# Patient Record
Sex: FEMALE | Race: WHITE | NOT HISPANIC OR LATINO | Employment: FULL TIME | URBAN - NONMETROPOLITAN AREA
[De-identification: names, ages, dates, MRNs, and addresses within clinical notes are randomized per-mention and may not be internally consistent; named-entity substitution may affect disease eponyms.]

---
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
## 2021-06-02 ENCOUNTER — APPOINTMENT (OUTPATIENT)
Dept: ULTRASOUND IMAGING | Facility: HOSPITAL | Age: 21
End: 2021-06-02

## 2021-06-02 ENCOUNTER — HOSPITAL ENCOUNTER (EMERGENCY)
Facility: HOSPITAL | Age: 21
Discharge: HOME | End: 2021-06-03
Attending: EMERGENCY MEDICINE | Admitting: EMERGENCY MEDICINE

## 2021-06-02 DIAGNOSIS — O20.0 THREATENED MISCARRIAGE IN EARLY PREGNANCY: Primary | ICD-10-CM

## 2021-06-02 LAB
BASOPHILS # BLD AUTO: 0.04 10*3/MM3 (ref 0–0.2)
BASOPHILS NFR BLD AUTO: 0.3 % (ref 0–1.5)
DEPRECATED RDW RBC AUTO: 40.7 FL (ref 37–54)
EOSINOPHIL # BLD AUTO: 0.23 10*3/MM3 (ref 0–0.4)
EOSINOPHIL NFR BLD AUTO: 2 % (ref 0.3–6.2)
ERYTHROCYTE [DISTWIDTH] IN BLOOD BY AUTOMATED COUNT: 12.5 % (ref 12.3–15.4)
HCT VFR BLD AUTO: 41.4 % (ref 34–46.6)
HGB BLD-MCNC: 13.8 G/DL (ref 12–15.9)
IMM GRANULOCYTES # BLD AUTO: 0.05 10*3/MM3 (ref 0–0.05)
IMM GRANULOCYTES NFR BLD AUTO: 0.4 % (ref 0–0.5)
LYMPHOCYTES # BLD AUTO: 3.57 10*3/MM3 (ref 0.7–3.1)
LYMPHOCYTES NFR BLD AUTO: 30.6 % (ref 19.6–45.3)
MCH RBC QN AUTO: 29.7 PG (ref 26.6–33)
MCHC RBC AUTO-ENTMCNC: 33.3 G/DL (ref 31.5–35.7)
MCV RBC AUTO: 89 FL (ref 79–97)
MONOCYTES # BLD AUTO: 1.14 10*3/MM3 (ref 0.1–0.9)
MONOCYTES NFR BLD AUTO: 9.8 % (ref 5–12)
NEUTROPHILS NFR BLD AUTO: 56.9 % (ref 42.7–76)
NEUTROPHILS NFR BLD AUTO: 6.63 10*3/MM3 (ref 1.7–7)
NRBC BLD AUTO-RTO: 0 /100 WBC (ref 0–0.2)
PLATELET # BLD AUTO: 327 10*3/MM3 (ref 140–450)
PMV BLD AUTO: 8.9 FL (ref 6–12)
RBC # BLD AUTO: 4.65 10*6/MM3 (ref 3.77–5.28)
WBC # BLD AUTO: 11.66 10*3/MM3 (ref 3.4–10.8)

## 2021-06-02 PROCEDURE — 86900 BLOOD TYPING SEROLOGIC ABO: CPT | Performed by: EMERGENCY MEDICINE

## 2021-06-02 PROCEDURE — 84702 CHORIONIC GONADOTROPIN TEST: CPT | Performed by: EMERGENCY MEDICINE

## 2021-06-02 PROCEDURE — 86850 RBC ANTIBODY SCREEN: CPT | Performed by: EMERGENCY MEDICINE

## 2021-06-02 PROCEDURE — 86901 BLOOD TYPING SEROLOGIC RH(D): CPT | Performed by: EMERGENCY MEDICINE

## 2021-06-02 PROCEDURE — 96361 HYDRATE IV INFUSION ADD-ON: CPT

## 2021-06-02 PROCEDURE — 76801 OB US < 14 WKS SINGLE FETUS: CPT

## 2021-06-02 PROCEDURE — 85025 COMPLETE CBC W/AUTO DIFF WBC: CPT | Performed by: EMERGENCY MEDICINE

## 2021-06-02 PROCEDURE — P9612 CATHETERIZE FOR URINE SPEC: HCPCS

## 2021-06-02 PROCEDURE — 96360 HYDRATION IV INFUSION INIT: CPT

## 2021-06-02 PROCEDURE — 80053 COMPREHEN METABOLIC PANEL: CPT | Performed by: EMERGENCY MEDICINE

## 2021-06-02 PROCEDURE — 99283 EMERGENCY DEPT VISIT LOW MDM: CPT

## 2021-06-02 RX ORDER — SODIUM CHLORIDE 9 MG/ML
125 INJECTION, SOLUTION INTRAVENOUS CONTINUOUS
Status: DISCONTINUED | OUTPATIENT
Start: 2021-06-02 | End: 2021-06-03 | Stop reason: HOSPADM

## 2021-06-02 RX ADMIN — SODIUM CHLORIDE 125 ML/HR: 9 INJECTION, SOLUTION INTRAVENOUS at 23:43

## 2021-06-02 RX ADMIN — SODIUM CHLORIDE 1000 ML: 9 INJECTION, SOLUTION INTRAVENOUS at 23:42

## 2021-06-03 VITALS
TEMPERATURE: 97.3 F | SYSTOLIC BLOOD PRESSURE: 118 MMHG | RESPIRATION RATE: 18 BRPM | WEIGHT: 215 LBS | BODY MASS INDEX: 33.74 KG/M2 | DIASTOLIC BLOOD PRESSURE: 57 MMHG | HEART RATE: 91 BPM | OXYGEN SATURATION: 100 % | HEIGHT: 67 IN

## 2021-06-03 LAB
ABO GROUP BLD: NORMAL
ALBUMIN SERPL-MCNC: 3.87 G/DL (ref 3.5–5.2)
ALBUMIN/GLOB SERPL: 1.2 G/DL
ALP SERPL-CCNC: 86 U/L (ref 39–117)
ALT SERPL W P-5'-P-CCNC: 26 U/L (ref 1–33)
ANION GAP SERPL CALCULATED.3IONS-SCNC: 11 MMOL/L (ref 5–15)
AST SERPL-CCNC: 18 U/L (ref 1–32)
BACTERIA UR QL AUTO: ABNORMAL /HPF
BILIRUB SERPL-MCNC: 0.2 MG/DL (ref 0–1.2)
BILIRUB UR QL STRIP: NEGATIVE
BLD GP AB SCN SERPL QL: NEGATIVE
BUN SERPL-MCNC: 13 MG/DL (ref 6–20)
BUN/CREAT SERPL: 12.5 (ref 7–25)
CALCIUM SPEC-SCNC: 9.2 MG/DL (ref 8.6–10.5)
CHLORIDE SERPL-SCNC: 104 MMOL/L (ref 98–107)
CLARITY UR: CLEAR
CO2 SERPL-SCNC: 24 MMOL/L (ref 22–29)
COLOR UR: YELLOW
CREAT SERPL-MCNC: 1.04 MG/DL (ref 0.57–1)
GFR SERPL CREATININE-BSD FRML MDRD: 67 ML/MIN/1.73
GLOBULIN UR ELPH-MCNC: 3.2 GM/DL
GLUCOSE SERPL-MCNC: 99 MG/DL (ref 65–99)
GLUCOSE UR STRIP-MCNC: NEGATIVE MG/DL
HCG INTACT+B SERPL-ACNC: 4235 MIU/ML
HGB UR QL STRIP.AUTO: ABNORMAL
HYALINE CASTS UR QL AUTO: ABNORMAL /LPF
KETONES UR QL STRIP: ABNORMAL
LEUKOCYTE ESTERASE UR QL STRIP.AUTO: NEGATIVE
NITRITE UR QL STRIP: NEGATIVE
NUMBER OF DOSES: NORMAL
PH UR STRIP.AUTO: 6 [PH] (ref 5–8)
POTASSIUM SERPL-SCNC: 3.8 MMOL/L (ref 3.5–5.2)
PROT SERPL-MCNC: 7.1 G/DL (ref 6–8.5)
PROT UR QL STRIP: NEGATIVE
RBC # UR: ABNORMAL /HPF
REF LAB TEST METHOD: ABNORMAL
RH BLD: NEGATIVE
SODIUM SERPL-SCNC: 139 MMOL/L (ref 136–145)
SP GR UR STRIP: >=1.03 (ref 1–1.03)
SQUAMOUS #/AREA URNS HPF: ABNORMAL /HPF
UROBILINOGEN UR QL STRIP: ABNORMAL
WBC UR QL AUTO: ABNORMAL /HPF

## 2021-06-03 PROCEDURE — 96372 THER/PROPH/DIAG INJ SC/IM: CPT

## 2021-06-03 PROCEDURE — 25010000002 RHO D IMMUNE GLOBULIN 1500 UNITS SOLUTION PREFILLED SYRINGE: Performed by: EMERGENCY MEDICINE

## 2021-06-03 PROCEDURE — 81001 URINALYSIS AUTO W/SCOPE: CPT | Performed by: EMERGENCY MEDICINE

## 2021-06-03 RX ADMIN — HUMAN RHO(D) IMMUNE GLOBULIN 300 MCG: 300 INJECTION, SOLUTION INTRAMUSCULAR at 01:14

## 2021-06-03 NOTE — ED PROVIDER NOTES
Subjective   21-year-old female G1, states 6 weeks pregnant. States that she had been carrying some heavy objects up and down the stairs in her home, went to the bathroom and noted some mild vaginal bleeding that included some small clots. No pain, fever, chills, syncope or near syncope, other symptoms or other complaints. She does not know her blood type. She reports that she is in general healthy, has no medical problems, takes no medications, has no known drug allergies.          Review of Systems   Constitutional: Negative for chills, diaphoresis and fever.   HENT: Negative for ear pain, sore throat and trouble swallowing.    Eyes: Negative for photophobia and pain.   Respiratory: Negative for shortness of breath, wheezing and stridor.    Cardiovascular: Negative for chest pain and palpitations.   Gastrointestinal: Negative for abdominal distention, abdominal pain, blood in stool, diarrhea, nausea and vomiting.   Endocrine: Negative for polydipsia and polyphagia.   Genitourinary: Positive for vaginal bleeding. Negative for difficulty urinating and flank pain.   Musculoskeletal: Negative for back pain, neck pain and neck stiffness.   Skin: Negative for color change and pallor.   Neurological: Negative for seizures, syncope and speech difficulty.   Psychiatric/Behavioral: Negative for confusion.   All other systems reviewed and are negative.      Past Medical History:   Diagnosis Date   • History of bronchitis    • History of ear infections        No Known Allergies    Past Surgical History:   Procedure Laterality Date   • ADENOIDECTOMY     • EAR TUBES         Family History   Problem Relation Age of Onset   • No Known Problems Mother    • No Known Problems Father        Social History     Socioeconomic History   • Marital status: Single     Spouse name: Not on file   • Number of children: Not on file   • Years of education: Not on file   • Highest education level: Not on file   Tobacco Use   • Smoking status:  Never Smoker   • Smokeless tobacco: Never Used   Substance and Sexual Activity   • Alcohol use: No   • Drug use: No           Objective   Physical Exam  Vitals and nursing note reviewed.   Constitutional:       General: She is not in acute distress.     Appearance: Normal appearance. She is well-developed. She is not ill-appearing, toxic-appearing or diaphoretic.      Comments: Well-developed well-nourished young female, in no apparent distress. Appears comfortable. Appears well.   HENT:      Head: Normocephalic and atraumatic.   Eyes:      General: No scleral icterus.     Pupils: Pupils are equal, round, and reactive to light.   Neck:      Trachea: No tracheal deviation.   Cardiovascular:      Rate and Rhythm: Normal rate and regular rhythm.   Pulmonary:      Effort: Pulmonary effort is normal. No respiratory distress.      Breath sounds: Normal breath sounds.   Chest:      Chest wall: No tenderness.   Abdominal:      General: Bowel sounds are normal.      Palpations: Abdomen is soft.      Tenderness: There is no abdominal tenderness. There is no guarding or rebound.   Musculoskeletal:         General: No tenderness. Normal range of motion.      Cervical back: Normal range of motion and neck supple. No rigidity or tenderness.      Right lower leg: No edema.      Left lower leg: No edema.   Skin:     General: Skin is warm and dry.      Capillary Refill: Capillary refill takes less than 2 seconds.      Coloration: Skin is not pale.   Neurological:      General: No focal deficit present.      Mental Status: She is alert and oriented to person, place, and time.      GCS: GCS eye subscore is 4. GCS verbal subscore is 5. GCS motor subscore is 6.      Motor: No abnormal muscle tone.   Psychiatric:         Mood and Affect: Mood normal.         Behavior: Behavior normal.         Procedures  US Ob < 14 Weeks Single or First Gestation    Result Date: 6/2/2021  Narrative: US OB 2 or 3 Tri Sgl 1st Gest INDICATION:  Patient  approximately 6 weeks pregnant. Bleeding. Nausea. TECHNIQUE: Transvaginal ultrasound of the pelvis in multiple planes. COMPARISON:  None available. FINDINGS: Uterus measures 8.5 x 3.7 x 4.3 cm. Cervix is closed. Small intrauterine gestational sac is noted. There is a tiny yolk sac but there is no definite fetal pole at this time. Estimated age by ultrasound measurement is 5 weeks 5 days for an estimated delivery date of 1/28/2022. Both ovaries show perfusion by Doppler. There is a small left ovarian cyst measuring 1.3 cm. Small right ovarian cyst is also present measuring about 1.0 cm. No adnexal masses.     Impression: 1. Early intrauterine pregnancy at about 5 weeks 5 days gestational age. Obstetrical follow-up recommended. Cervix is closed. 2. Small bilateral ovarian cysts. 3. No evidence of ovarian torsion. Signer Name: Fernando Ronquillo MD  Signed: 6/2/2021 11:18 PM  Workstation Name: [facility]  [facility]    Results for orders placed or performed during the hospital encounter of 06/02/21   Comprehensive Metabolic Panel    Specimen: Blood   Result Value Ref Range    Glucose 99 65 - 99 mg/dL    BUN 13 6 - 20 mg/dL    Creatinine 1.04 (H) 0.57 - 1.00 mg/dL    Sodium 139 136 - 145 mmol/L    Potassium 3.8 3.5 - 5.2 mmol/L    Chloride 104 98 - 107 mmol/L    CO2 24.0 22.0 - 29.0 mmol/L    Calcium 9.2 8.6 - 10.5 mg/dL    Total Protein 7.1 6.0 - 8.5 g/dL    Albumin 3.87 3.50 - 5.20 g/dL    ALT (SGPT) 26 1 - 33 U/L    AST (SGOT) 18 1 - 32 U/L    Alkaline Phosphatase 86 39 - 117 U/L    Total Bilirubin 0.2 0.0 - 1.2 mg/dL    eGFR Non African Amer 67 >60 mL/min/1.73    Globulin 3.2 gm/dL    A/G Ratio 1.2 g/dL    BUN/Creatinine Ratio 12.5 7.0 - 25.0    Anion Gap 11.0 5.0 - 15.0 mmol/L   hCG, Quantitative, Pregnancy    Specimen: Blood   Result Value Ref Range    HCG Quantitative 4,235.00 mIU/mL   Urinalysis With Culture If Indicated - Urine, Catheter    Specimen: Urine, Catheter   Result Value Ref  Range    Color, UA Yellow Yellow, Straw    Appearance, UA Clear Clear    pH, UA 6.0 5.0 - 8.0    Specific Gravity, UA >=1.030 1.005 - 1.030    Glucose, UA Negative Negative    Ketones, UA Trace (A) Negative    Bilirubin, UA Negative Negative    Blood, UA Trace (A) Negative    Protein, UA Negative Negative    Leuk Esterase, UA Negative Negative    Nitrite, UA Negative Negative    Urobilinogen, UA 0.2 E.U./dL 0.2 - 1.0 E.U./dL   CBC Auto Differential    Specimen: Blood   Result Value Ref Range    WBC 11.66 (H) 3.40 - 10.80 10*3/mm3    RBC 4.65 3.77 - 5.28 10*6/mm3    Hemoglobin 13.8 12.0 - 15.9 g/dL    Hematocrit 41.4 34.0 - 46.6 %    MCV 89.0 79.0 - 97.0 fL    MCH 29.7 26.6 - 33.0 pg    MCHC 33.3 31.5 - 35.7 g/dL    RDW 12.5 12.3 - 15.4 %    RDW-SD 40.7 37.0 - 54.0 fl    MPV 8.9 6.0 - 12.0 fL    Platelets 327 140 - 450 10*3/mm3    Neutrophil % 56.9 42.7 - 76.0 %    Lymphocyte % 30.6 19.6 - 45.3 %    Monocyte % 9.8 5.0 - 12.0 %    Eosinophil % 2.0 0.3 - 6.2 %    Basophil % 0.3 0.0 - 1.5 %    Immature Grans % 0.4 0.0 - 0.5 %    Neutrophils, Absolute 6.63 1.70 - 7.00 10*3/mm3    Lymphocytes, Absolute 3.57 (H) 0.70 - 3.10 10*3/mm3    Monocytes, Absolute 1.14 (H) 0.10 - 0.90 10*3/mm3    Eosinophils, Absolute 0.23 0.00 - 0.40 10*3/mm3    Basophils, Absolute 0.04 0.00 - 0.20 10*3/mm3    Immature Grans, Absolute 0.05 0.00 - 0.05 10*3/mm3    nRBC 0.0 0.0 - 0.2 /100 WBC   Urinalysis, Microscopic Only - Urine, Catheter    Specimen: Urine, Catheter   Result Value Ref Range    RBC, UA 3-5 (A) None Seen, 0-2 /HPF    WBC, UA 0-2 None Seen, 0-2 /HPF    Bacteria, UA None Seen None Seen /HPF    Squamous Epithelial Cells, UA 0-2 None Seen, 0-2 /HPF    Hyaline Casts, UA None Seen None Seen /LPF    Methodology Automated Microscopy     RhIg Evaluation    Specimen: Blood   Result Value Ref Range    ABO Type B     RH type Negative     Antibody Screen Negative    Doses of Rh Immune Globulin    Specimen: Blood   Result Value Ref  Range    Number of Doses Recommend 1 vial of RhIg                 ED Course  ED Course as of Jun 03 0303   Thu Jun 03, 2021   0059 Patient's emergency department stay has been uneventful.  Never has she shown any signs of distress.  We discussed her test results and her plan of care.  She voices understanding and agreement.  She is going to be following with [facility] for obstetrical care.  She will follow-up with them in the next 1 to 2 days.  She will return the emergency department right away if symptoms worsen/any problems.    [CM]      ED Course User Index  [CM] Jorge Butler MD                                           [facility]    Final diagnoses:   Threatened miscarriage in early pregnancy       ED Disposition  ED Disposition     ED Disposition Condition Comment    Discharge Stable               Please note that portions of this note were completed with a voice recognition program.        Jorge Butler MD  06/03/21 [id number]

## 2021-06-03 NOTE — DISCHARGE INSTRUCTIONS
Home to rest.  Drink plenty of fluids.  No sex, no strenuous activity.  Call [place] [facility] early this morning to schedule first available follow-up appointment.  Return to the emergency department right away if symptoms worsen/any problems.

## 2021-06-04 LAB
EXTERNAL CHLAMYDIA SCREEN: NORMAL
EXTERNAL GONORRHEA SCREEN: NEGATIVE
EXTERNAL HEPATITIS B SURFACE ANTIGEN: NEGATIVE
EXTERNAL HEPATITIS C AB: NORMAL
EXTERNAL RUBELLA QUALITATIVE: NORMAL
EXTERNAL SYPHILIS RPR SCREEN: NORMAL
HIV1 P24 AG SERPL QL IA: NORMAL

## 2021-09-16 ENCOUNTER — LAB (OUTPATIENT)
Dept: LAB | Facility: HOSPITAL | Age: 21
End: 2021-09-16

## 2021-09-16 ENCOUNTER — TRANSCRIBE ORDERS (OUTPATIENT)
Dept: ADMINISTRATIVE | Facility: HOSPITAL | Age: 21
End: 2021-09-16

## 2021-09-16 DIAGNOSIS — Z11.52 ENCOUNTER FOR SCREENING FOR COVID-19: ICD-10-CM

## 2021-09-16 DIAGNOSIS — Z11.52 ENCOUNTER FOR SCREENING FOR COVID-19: Primary | ICD-10-CM

## 2021-09-16 PROCEDURE — C9803 HOPD COVID-19 SPEC COLLECT: HCPCS

## 2021-09-16 PROCEDURE — U0004 COV-19 TEST NON-CDC HGH THRU: HCPCS | Performed by: FAMILY MEDICINE

## 2021-09-17 LAB — SARS-COV-2 RNA NOSE QL NAA+PROBE: NOT DETECTED

## 2021-10-29 LAB — EXTERNAL GLUCOSE TOLERANCE TEST FASTING: 188 MG/DL

## 2021-11-03 LAB
EXTERNAL GTT 1 HOUR: 150
EXTERNAL GTT 3 HOURS: 132

## 2021-12-17 ENCOUNTER — HOSPITAL ENCOUNTER (INPATIENT)
Facility: HOSPITAL | Age: 21
LOS: 1 days | Discharge: HOME | End: 2021-12-18
Attending: OBSTETRICS & GYNECOLOGY | Admitting: OBSTETRICS & GYNECOLOGY

## 2021-12-17 PROBLEM — J10.1 INFLUENZA A: Status: ACTIVE | Noted: 2021-12-17

## 2021-12-17 PROBLEM — R53.1 WEAKNESS: Status: ACTIVE | Noted: 2021-12-17

## 2021-12-17 LAB
ALBUMIN SERPL-MCNC: 2.88 G/DL (ref 3.5–5.2)
ALBUMIN/GLOB SERPL: 1.1 G/DL
ALP SERPL-CCNC: 114 U/L (ref 39–117)
ALT SERPL W P-5'-P-CCNC: 6 U/L (ref 1–33)
ANION GAP SERPL CALCULATED.3IONS-SCNC: 14 MMOL/L (ref 5–15)
AST SERPL-CCNC: 10 U/L (ref 1–32)
BASOPHILS # BLD AUTO: 0.01 10*3/MM3 (ref 0–0.2)
BASOPHILS NFR BLD AUTO: 0.2 % (ref 0–1.5)
BILIRUB SERPL-MCNC: 0.2 MG/DL (ref 0–1.2)
BILIRUB UR QL STRIP: NEGATIVE
BUN SERPL-MCNC: 5 MG/DL (ref 6–20)
BUN/CREAT SERPL: 9.3 (ref 7–25)
CALCIUM SPEC-SCNC: 8.5 MG/DL (ref 8.6–10.5)
CHLORIDE SERPL-SCNC: 103 MMOL/L (ref 98–107)
CLARITY UR: ABNORMAL
CO2 SERPL-SCNC: 20 MMOL/L (ref 22–29)
COLOR UR: YELLOW
CREAT SERPL-MCNC: 0.54 MG/DL (ref 0.57–1)
DEPRECATED RDW RBC AUTO: 45.1 FL (ref 37–54)
EOSINOPHIL # BLD AUTO: 0 10*3/MM3 (ref 0–0.4)
EOSINOPHIL NFR BLD AUTO: 0 % (ref 0.3–6.2)
ERYTHROCYTE [DISTWIDTH] IN BLOOD BY AUTOMATED COUNT: 14 % (ref 12.3–15.4)
FLUAV SUBTYP SPEC NAA+PROBE: DETECTED
FLUBV RNA ISLT QL NAA+PROBE: NOT DETECTED
GFR SERPL CREATININE-BSD FRML MDRD: 143 ML/MIN/1.73
GLOBULIN UR ELPH-MCNC: 2.7 GM/DL
GLUCOSE SERPL-MCNC: 107 MG/DL (ref 65–99)
GLUCOSE UR STRIP-MCNC: ABNORMAL MG/DL
HCT VFR BLD AUTO: 32.9 % (ref 34–46.6)
HGB BLD-MCNC: 10.6 G/DL (ref 12–15.9)
HGB UR QL STRIP.AUTO: NEGATIVE
IMM GRANULOCYTES # BLD AUTO: 0.04 10*3/MM3 (ref 0–0.05)
IMM GRANULOCYTES NFR BLD AUTO: 0.7 % (ref 0–0.5)
KETONES UR QL STRIP: ABNORMAL
LEUKOCYTE ESTERASE UR QL STRIP.AUTO: NEGATIVE
LYMPHOCYTES # BLD AUTO: 0.95 10*3/MM3 (ref 0.7–3.1)
LYMPHOCYTES NFR BLD AUTO: 16.7 % (ref 19.6–45.3)
MCH RBC QN AUTO: 28.7 PG (ref 26.6–33)
MCHC RBC AUTO-ENTMCNC: 32.2 G/DL (ref 31.5–35.7)
MCV RBC AUTO: 89.2 FL (ref 79–97)
MONOCYTES # BLD AUTO: 1 10*3/MM3 (ref 0.1–0.9)
MONOCYTES NFR BLD AUTO: 17.6 % (ref 5–12)
NEUTROPHILS NFR BLD AUTO: 3.68 10*3/MM3 (ref 1.7–7)
NEUTROPHILS NFR BLD AUTO: 64.8 % (ref 42.7–76)
NITRITE UR QL STRIP: NEGATIVE
NRBC BLD AUTO-RTO: 0 /100 WBC (ref 0–0.2)
PH UR STRIP.AUTO: 7 [PH] (ref 5–8)
PLATELET # BLD AUTO: 199 10*3/MM3 (ref 140–450)
PMV BLD AUTO: 9.2 FL (ref 6–12)
POTASSIUM SERPL-SCNC: 3.6 MMOL/L (ref 3.5–5.2)
PROT SERPL-MCNC: 5.6 G/DL (ref 6–8.5)
PROT UR QL STRIP: NEGATIVE
QT INTERVAL: 328 MS
QTC INTERVAL: 435 MS
RBC # BLD AUTO: 3.69 10*6/MM3 (ref 3.77–5.28)
S PYO AG THROAT QL: NEGATIVE
SARS-COV-2 RNA PNL SPEC NAA+PROBE: NOT DETECTED
SARS-COV-2 RNA PNL SPEC NAA+PROBE: NOT DETECTED
SODIUM SERPL-SCNC: 137 MMOL/L (ref 136–145)
SP GR UR STRIP: 1.01 (ref 1–1.03)
UROBILINOGEN UR QL STRIP: ABNORMAL
WBC NRBC COR # BLD: 5.68 10*3/MM3 (ref 3.4–10.8)

## 2021-12-17 PROCEDURE — 80053 COMPREHEN METABOLIC PANEL: CPT | Performed by: OBSTETRICS & GYNECOLOGY

## 2021-12-17 PROCEDURE — 36415 COLL VENOUS BLD VENIPUNCTURE: CPT | Performed by: OBSTETRICS & GYNECOLOGY

## 2021-12-17 PROCEDURE — 87635 SARS-COV-2 COVID-19 AMP PRB: CPT | Performed by: OBSTETRICS & GYNECOLOGY

## 2021-12-17 PROCEDURE — 81003 URINALYSIS AUTO W/O SCOPE: CPT | Performed by: INTERNAL MEDICINE

## 2021-12-17 PROCEDURE — 85025 COMPLETE CBC W/AUTO DIFF WBC: CPT | Performed by: OBSTETRICS & GYNECOLOGY

## 2021-12-17 PROCEDURE — 87880 STREP A ASSAY W/OPTIC: CPT | Performed by: OBSTETRICS & GYNECOLOGY

## 2021-12-17 PROCEDURE — 87081 CULTURE SCREEN ONLY: CPT | Performed by: OBSTETRICS & GYNECOLOGY

## 2021-12-17 PROCEDURE — 99221 1ST HOSP IP/OBS SF/LOW 40: CPT | Performed by: PHYSICIAN ASSISTANT

## 2021-12-17 PROCEDURE — 93010 ELECTROCARDIOGRAM REPORT: CPT | Performed by: INTERNAL MEDICINE

## 2021-12-17 PROCEDURE — 59025 FETAL NON-STRESS TEST: CPT

## 2021-12-17 PROCEDURE — 87086 URINE CULTURE/COLONY COUNT: CPT | Performed by: PHYSICIAN ASSISTANT

## 2021-12-17 PROCEDURE — 80306 DRUG TEST PRSMV INSTRMNT: CPT | Performed by: PHYSICIAN ASSISTANT

## 2021-12-17 PROCEDURE — 87636 SARSCOV2 & INF A&B AMP PRB: CPT | Performed by: OBSTETRICS & GYNECOLOGY

## 2021-12-17 PROCEDURE — 93005 ELECTROCARDIOGRAM TRACING: CPT | Performed by: OBSTETRICS & GYNECOLOGY

## 2021-12-17 RX ORDER — SODIUM CHLORIDE 0.9 % (FLUSH) 0.9 %
10 SYRINGE (ML) INJECTION AS NEEDED
Status: DISCONTINUED | OUTPATIENT
Start: 2021-12-17 | End: 2021-12-18 | Stop reason: HOSPADM

## 2021-12-17 RX ORDER — SODIUM CHLORIDE 0.9 % (FLUSH) 0.9 %
10 SYRINGE (ML) INJECTION EVERY 12 HOURS SCHEDULED
Status: DISCONTINUED | OUTPATIENT
Start: 2021-12-17 | End: 2021-12-18 | Stop reason: HOSPADM

## 2021-12-17 RX ORDER — PRENATAL VIT/IRON FUM/FOLIC AC 27MG-0.8MG
1 TABLET ORAL DAILY
Status: DISCONTINUED | OUTPATIENT
Start: 2021-12-18 | End: 2021-12-18 | Stop reason: HOSPADM

## 2021-12-17 RX ORDER — PRENATAL VIT/IRON FUM/FOLIC AC 27MG-0.8MG
1 TABLET ORAL DAILY
COMMUNITY
End: 2022-03-23

## 2021-12-17 RX ORDER — CALCIUM CARBONATE 200(500)MG
2 TABLET,CHEWABLE ORAL 3 TIMES DAILY PRN
Status: DISCONTINUED | OUTPATIENT
Start: 2021-12-17 | End: 2021-12-18 | Stop reason: HOSPADM

## 2021-12-17 RX ORDER — ACETAMINOPHEN 325 MG/1
650 TABLET ORAL EVERY 6 HOURS PRN
Status: DISCONTINUED | OUTPATIENT
Start: 2021-12-17 | End: 2021-12-18 | Stop reason: HOSPADM

## 2021-12-17 RX ORDER — ALUMINA, MAGNESIA, AND SIMETHICONE 2400; 2400; 240 MG/30ML; MG/30ML; MG/30ML
15 SUSPENSION ORAL EVERY 6 HOURS PRN
Status: DISCONTINUED | OUTPATIENT
Start: 2021-12-17 | End: 2021-12-18 | Stop reason: HOSPADM

## 2021-12-17 RX ORDER — SODIUM CHLORIDE, SODIUM LACTATE, POTASSIUM CHLORIDE, CALCIUM CHLORIDE 600; 310; 30; 20 MG/100ML; MG/100ML; MG/100ML; MG/100ML
125 INJECTION, SOLUTION INTRAVENOUS CONTINUOUS
Status: DISCONTINUED | OUTPATIENT
Start: 2021-12-17 | End: 2021-12-18 | Stop reason: HOSPADM

## 2021-12-17 RX ORDER — OSELTAMIVIR PHOSPHATE 75 MG/1
75 CAPSULE ORAL EVERY 12 HOURS SCHEDULED
Status: DISCONTINUED | OUTPATIENT
Start: 2021-12-17 | End: 2021-12-18 | Stop reason: HOSPADM

## 2021-12-17 RX ADMIN — SODIUM CHLORIDE, POTASSIUM CHLORIDE, SODIUM LACTATE AND CALCIUM CHLORIDE 100 ML/HR: 600; 310; 30; 20 INJECTION, SOLUTION INTRAVENOUS at 19:30

## 2021-12-17 RX ADMIN — OSELTAMIVIR PHOSPHATE 75 MG: 75 CAPSULE ORAL at 20:18

## 2021-12-17 RX ADMIN — SODIUM CHLORIDE, POTASSIUM CHLORIDE, SODIUM LACTATE AND CALCIUM CHLORIDE 1000 ML: 600; 310; 30; 20 INJECTION, SOLUTION INTRAVENOUS at 18:40

## 2021-12-17 NOTE — NON STRESS TEST
Liliam Crooks, a  at 33w1d with an SONIA of 2/3/2022, by Ultrasound, was seen at [facility] LABOR DELIVERY for a nonstress test.    Chief Complaint   Patient presents with   • Weakness - Generalized     c/o cough dizziness sore throat       Patient Active Problem List   Diagnosis   • Weakness       Start Time: 1645  Stop Time: 1700    Interpretation A  Nonstress Test Interpretation A: Reactive (21 1700 : Arlette Fragoso, RN)

## 2021-12-18 ENCOUNTER — APPOINTMENT (OUTPATIENT)
Dept: ULTRASOUND IMAGING | Facility: HOSPITAL | Age: 21
End: 2021-12-18

## 2021-12-18 ENCOUNTER — APPOINTMENT (OUTPATIENT)
Dept: CARDIOLOGY | Facility: HOSPITAL | Age: 21
End: 2021-12-18

## 2021-12-18 ENCOUNTER — HOSPITAL ENCOUNTER (OUTPATIENT)
Facility: HOSPITAL | Age: 21
End: 2021-12-18
Attending: OBSTETRICS & GYNECOLOGY | Admitting: OBSTETRICS & GYNECOLOGY

## 2021-12-18 VITALS
WEIGHT: 251 LBS | HEART RATE: 105 BPM | DIASTOLIC BLOOD PRESSURE: 62 MMHG | HEIGHT: 67 IN | BODY MASS INDEX: 39.39 KG/M2 | OXYGEN SATURATION: 98 % | RESPIRATION RATE: 20 BRPM | SYSTOLIC BLOOD PRESSURE: 123 MMHG | TEMPERATURE: 98.3 F

## 2021-12-18 LAB
ALBUMIN SERPL-MCNC: 2.83 G/DL (ref 3.5–5.2)
ALBUMIN/GLOB SERPL: 1.1 G/DL
ALP SERPL-CCNC: 109 U/L (ref 39–117)
ALT SERPL W P-5'-P-CCNC: 7 U/L (ref 1–33)
AMPHET+METHAMPHET UR QL: NEGATIVE
AMPHETAMINES UR QL: NEGATIVE
ANION GAP SERPL CALCULATED.3IONS-SCNC: 13.1 MMOL/L (ref 5–15)
AST SERPL-CCNC: 8 U/L (ref 1–32)
BARBITURATES UR QL SCN: NEGATIVE
BASOPHILS # BLD AUTO: 0.01 10*3/MM3 (ref 0–0.2)
BASOPHILS NFR BLD AUTO: 0.2 % (ref 0–1.5)
BENZODIAZ UR QL SCN: NEGATIVE
BH CV ECHO MEAS - ACS: 1.7 CM
BH CV ECHO MEAS - AO MAX PG: 10.9 MMHG
BH CV ECHO MEAS - AO MEAN PG: 5 MMHG
BH CV ECHO MEAS - AO ROOT AREA (BSA CORRECTED): 1.2
BH CV ECHO MEAS - AO ROOT AREA: 5.7 CM^2
BH CV ECHO MEAS - AO ROOT DIAM: 2.7 CM
BH CV ECHO MEAS - AO V2 MAX: 165 CM/SEC
BH CV ECHO MEAS - AO V2 MEAN: 104 CM/SEC
BH CV ECHO MEAS - AO V2 VTI: 33.2 CM
BH CV ECHO MEAS - BSA(HAYCOCK): 2.4 M^2
BH CV ECHO MEAS - BSA: 2.2 M^2
BH CV ECHO MEAS - BZI_BMI: 39.3 KILOGRAMS/M^2
BH CV ECHO MEAS - BZI_METRIC_HEIGHT: 170.2 CM
BH CV ECHO MEAS - BZI_METRIC_WEIGHT: 113.9 KG
BH CV ECHO MEAS - EDV(CUBED): 110.6 ML
BH CV ECHO MEAS - EDV(MOD-SP4): 59.5 ML
BH CV ECHO MEAS - EDV(TEICH): 107.5 ML
BH CV ECHO MEAS - EF(CUBED): 70.4 %
BH CV ECHO MEAS - EF(MOD-SP4): 72.1 %
BH CV ECHO MEAS - EF(TEICH): 61.9 %
BH CV ECHO MEAS - ESV(CUBED): 32.8 ML
BH CV ECHO MEAS - ESV(MOD-SP4): 16.6 ML
BH CV ECHO MEAS - ESV(TEICH): 41 ML
BH CV ECHO MEAS - FS: 33.3 %
BH CV ECHO MEAS - IVS/LVPW: 0.89
BH CV ECHO MEAS - IVSD: 0.8 CM
BH CV ECHO MEAS - LA DIMENSION: 2.9 CM
BH CV ECHO MEAS - LA/AO: 1.1
BH CV ECHO MEAS - LV DIASTOLIC VOL/BSA (35-75): 26.7 ML/M^2
BH CV ECHO MEAS - LV MASS(C)D: 137.1 GRAMS
BH CV ECHO MEAS - LV MASS(C)DI: 61.6 GRAMS/M^2
BH CV ECHO MEAS - LV SYSTOLIC VOL/BSA (12-30): 7.5 ML/M^2
BH CV ECHO MEAS - LVIDD: 4.8 CM
BH CV ECHO MEAS - LVIDS: 3.2 CM
BH CV ECHO MEAS - LVLD AP4: 7.4 CM
BH CV ECHO MEAS - LVLS AP4: 5.9 CM
BH CV ECHO MEAS - LVOT AREA (M): 3.5 CM^2
BH CV ECHO MEAS - LVOT AREA: 3.5 CM^2
BH CV ECHO MEAS - LVOT DIAM: 2.1 CM
BH CV ECHO MEAS - LVPWD: 0.9 CM
BH CV ECHO MEAS - MV A MAX VEL: 45.3 CM/SEC
BH CV ECHO MEAS - MV E MAX VEL: 87.5 CM/SEC
BH CV ECHO MEAS - MV E/A: 1.9
BH CV ECHO MEAS - PA ACC TIME: 0.09 SEC
BH CV ECHO MEAS - PA PR(ACCEL): 40.8 MMHG
BH CV ECHO MEAS - RAP SYSTOLE: 10 MMHG
BH CV ECHO MEAS - RVSP: 28 MMHG
BH CV ECHO MEAS - SI(AO): 85.4 ML/M^2
BH CV ECHO MEAS - SI(CUBED): 34.9 ML/M^2
BH CV ECHO MEAS - SI(MOD-SP4): 19.3 ML/M^2
BH CV ECHO MEAS - SI(TEICH): 29.9 ML/M^2
BH CV ECHO MEAS - SV(AO): 190.1 ML
BH CV ECHO MEAS - SV(CUBED): 77.8 ML
BH CV ECHO MEAS - SV(MOD-SP4): 42.9 ML
BH CV ECHO MEAS - SV(TEICH): 66.6 ML
BH CV ECHO MEAS - TR MAX VEL: 212 CM/SEC
BILIRUB SERPL-MCNC: 0.2 MG/DL (ref 0–1.2)
BUN SERPL-MCNC: 4 MG/DL (ref 6–20)
BUN/CREAT SERPL: 6.9 (ref 7–25)
BUPRENORPHINE SERPL-MCNC: NEGATIVE NG/ML
CALCIUM SPEC-SCNC: 8.5 MG/DL (ref 8.6–10.5)
CANNABINOIDS SERPL QL: NEGATIVE
CHLORIDE SERPL-SCNC: 105 MMOL/L (ref 98–107)
CO2 SERPL-SCNC: 20.9 MMOL/L (ref 22–29)
COCAINE UR QL: NEGATIVE
CREAT SERPL-MCNC: 0.58 MG/DL (ref 0.57–1)
CRP SERPL-MCNC: 2.24 MG/DL (ref 0–0.5)
D-LACTATE SERPL-SCNC: 0.6 MMOL/L (ref 0.5–2)
DEPRECATED RDW RBC AUTO: 46.2 FL (ref 37–54)
EOSINOPHIL # BLD AUTO: 0 10*3/MM3 (ref 0–0.4)
EOSINOPHIL NFR BLD AUTO: 0 % (ref 0.3–6.2)
ERYTHROCYTE [DISTWIDTH] IN BLOOD BY AUTOMATED COUNT: 14.4 % (ref 12.3–15.4)
FERRITIN SERPL-MCNC: 51.13 NG/ML (ref 13–150)
FOLATE SERPL-MCNC: 8.77 NG/ML (ref 4.78–24.2)
GFR SERPL CREATININE-BSD FRML MDRD: 131 ML/MIN/1.73
GLOBULIN UR ELPH-MCNC: 2.5 GM/DL
GLUCOSE SERPL-MCNC: 92 MG/DL (ref 65–99)
HBA1C MFR BLD: 5.3 % (ref 4.8–5.6)
HCT VFR BLD AUTO: 31.4 % (ref 34–46.6)
HGB BLD-MCNC: 10.2 G/DL (ref 12–15.9)
IMM GRANULOCYTES # BLD AUTO: 0.04 10*3/MM3 (ref 0–0.05)
IMM GRANULOCYTES NFR BLD AUTO: 0.7 % (ref 0–0.5)
IRON 24H UR-MRATE: 41 MCG/DL (ref 37–145)
IRON SATN MFR SERPL: 9 % (ref 20–50)
LYMPHOCYTES # BLD AUTO: 1.2 10*3/MM3 (ref 0.7–3.1)
LYMPHOCYTES NFR BLD AUTO: 21.2 % (ref 19.6–45.3)
MAGNESIUM SERPL-MCNC: 1.6 MG/DL (ref 1.6–2.6)
MAXIMAL PREDICTED HEART RATE: 199 BPM
MCH RBC QN AUTO: 28.9 PG (ref 26.6–33)
MCHC RBC AUTO-ENTMCNC: 32.5 G/DL (ref 31.5–35.7)
MCV RBC AUTO: 89 FL (ref 79–97)
METHADONE UR QL SCN: NEGATIVE
MONOCYTES # BLD AUTO: 0.96 10*3/MM3 (ref 0.1–0.9)
MONOCYTES NFR BLD AUTO: 17 % (ref 5–12)
NEUTROPHILS NFR BLD AUTO: 3.44 10*3/MM3 (ref 1.7–7)
NEUTROPHILS NFR BLD AUTO: 60.9 % (ref 42.7–76)
NRBC BLD AUTO-RTO: 0 /100 WBC (ref 0–0.2)
OPIATES UR QL: NEGATIVE
OXYCODONE UR QL SCN: NEGATIVE
PCP UR QL SCN: NEGATIVE
PLATELET # BLD AUTO: 207 10*3/MM3 (ref 140–450)
PMV BLD AUTO: 9.5 FL (ref 6–12)
POTASSIUM SERPL-SCNC: 3.6 MMOL/L (ref 3.5–5.2)
PROCALCITONIN SERPL-MCNC: 0.13 NG/ML (ref 0–0.25)
PROPOXYPH UR QL: NEGATIVE
PROT SERPL-MCNC: 5.3 G/DL (ref 6–8.5)
RBC # BLD AUTO: 3.53 10*6/MM3 (ref 3.77–5.28)
SODIUM SERPL-SCNC: 139 MMOL/L (ref 136–145)
STRESS TARGET HR: 169 BPM
T4 FREE SERPL-MCNC: 0.84 NG/DL (ref 0.93–1.7)
TIBC SERPL-MCNC: 448 MCG/DL (ref 298–536)
TRANSFERRIN SERPL-MCNC: 301 MG/DL (ref 200–360)
TRICYCLICS UR QL SCN: NEGATIVE
TSH SERPL DL<=0.05 MIU/L-ACNC: 1.95 UIU/ML (ref 0.27–4.2)
VIT B12 BLD-MCNC: <150 PG/ML (ref 211–946)
WBC NRBC COR # BLD: 5.65 10*3/MM3 (ref 3.4–10.8)

## 2021-12-18 PROCEDURE — 86140 C-REACTIVE PROTEIN: CPT | Performed by: PHYSICIAN ASSISTANT

## 2021-12-18 PROCEDURE — 84439 ASSAY OF FREE THYROXINE: CPT | Performed by: PHYSICIAN ASSISTANT

## 2021-12-18 PROCEDURE — 84145 PROCALCITONIN (PCT): CPT | Performed by: PHYSICIAN ASSISTANT

## 2021-12-18 PROCEDURE — 82746 ASSAY OF FOLIC ACID SERUM: CPT | Performed by: PHYSICIAN ASSISTANT

## 2021-12-18 PROCEDURE — 76819 FETAL BIOPHYS PROFIL W/O NST: CPT

## 2021-12-18 PROCEDURE — 82728 ASSAY OF FERRITIN: CPT | Performed by: PHYSICIAN ASSISTANT

## 2021-12-18 PROCEDURE — 83540 ASSAY OF IRON: CPT | Performed by: PHYSICIAN ASSISTANT

## 2021-12-18 PROCEDURE — 82607 VITAMIN B-12: CPT | Performed by: PHYSICIAN ASSISTANT

## 2021-12-18 PROCEDURE — 83605 ASSAY OF LACTIC ACID: CPT | Performed by: PHYSICIAN ASSISTANT

## 2021-12-18 PROCEDURE — 83735 ASSAY OF MAGNESIUM: CPT | Performed by: PHYSICIAN ASSISTANT

## 2021-12-18 PROCEDURE — 85025 COMPLETE CBC W/AUTO DIFF WBC: CPT | Performed by: PHYSICIAN ASSISTANT

## 2021-12-18 PROCEDURE — 99222 1ST HOSP IP/OBS MODERATE 55: CPT | Performed by: SPECIALIST

## 2021-12-18 PROCEDURE — 59025 FETAL NON-STRESS TEST: CPT

## 2021-12-18 PROCEDURE — 80053 COMPREHEN METABOLIC PANEL: CPT | Performed by: PHYSICIAN ASSISTANT

## 2021-12-18 PROCEDURE — 84466 ASSAY OF TRANSFERRIN: CPT | Performed by: PHYSICIAN ASSISTANT

## 2021-12-18 PROCEDURE — 83036 HEMOGLOBIN GLYCOSYLATED A1C: CPT | Performed by: PHYSICIAN ASSISTANT

## 2021-12-18 PROCEDURE — 84443 ASSAY THYROID STIM HORMONE: CPT | Performed by: PHYSICIAN ASSISTANT

## 2021-12-18 PROCEDURE — 93306 TTE W/DOPPLER COMPLETE: CPT | Performed by: SPECIALIST

## 2021-12-18 PROCEDURE — 93306 TTE W/DOPPLER COMPLETE: CPT

## 2021-12-18 RX ORDER — OSELTAMIVIR PHOSPHATE 75 MG/1
75 CAPSULE ORAL EVERY 12 HOURS SCHEDULED
Qty: 8 CAPSULE | Refills: 0 | Status: SHIPPED | OUTPATIENT
Start: 2021-12-18 | End: 2021-12-22

## 2021-12-18 RX ORDER — LANOLIN ALCOHOL/MO/W.PET/CERES
1000 CREAM (GRAM) TOPICAL DAILY
Status: DISCONTINUED | OUTPATIENT
Start: 2021-12-19 | End: 2021-12-18 | Stop reason: HOSPADM

## 2021-12-18 RX ADMIN — SODIUM CHLORIDE, POTASSIUM CHLORIDE, SODIUM LACTATE AND CALCIUM CHLORIDE 125 ML/HR: 600; 310; 30; 20 INJECTION, SOLUTION INTRAVENOUS at 12:51

## 2021-12-18 RX ADMIN — PRENATAL VIT W/ FE FUMARATE-FA TAB 27-0.8 MG 1 TABLET: 27-0.8 TAB at 09:13

## 2021-12-18 RX ADMIN — SODIUM CHLORIDE, PRESERVATIVE FREE 10 ML: 5 INJECTION INTRAVENOUS at 09:13

## 2021-12-18 RX ADMIN — ACETAMINOPHEN 650 MG: 325 TABLET ORAL at 13:39

## 2021-12-18 RX ADMIN — SODIUM CHLORIDE, POTASSIUM CHLORIDE, SODIUM LACTATE AND CALCIUM CHLORIDE 125 ML/HR: 600; 310; 30; 20 INJECTION, SOLUTION INTRAVENOUS at 04:01

## 2021-12-18 RX ADMIN — OSELTAMIVIR PHOSPHATE 75 MG: 75 CAPSULE ORAL at 09:13

## 2021-12-18 NOTE — NON STRESS TEST
Liliam Crooks, a  at 33w2d with an SONIA of 2/3/2022, by Ultrasound, was seen at [facility] LABOR DELIVERY for a nonstress test.    Chief Complaint   Patient presents with   • Weakness - Generalized     c/o cough dizziness sore throat       Patient Active Problem List   Diagnosis   • Weakness   • Influenza A       Start Time:   Stop Time:     Interpretation A  Nonstress Test Interpretation A: Reactive (21 : Marine Edwards RN)

## 2021-12-18 NOTE — CONSULTS
Date of Admit: 2021  Date of Consult: 21  Provider, No Known        Influenza A    Weakness      Assessment      1. Acute influenza  2. Sinus tachycardia with palpitations  3. 63 weeks pregnancy  4. Recently quitting vaping  5. EKG changes      Recommendations     1. Patient in sinus tachycardia likely related to combination of advancing pregnancy in the setting of acute influenza infection  2. Nonspecific ST changes on the EKG possibly related to the above  3. We will get an echocardiogram to assess cardiac function wall motion and valve morphology        Reason for consultation: Sinus tachycardia    Subjective       Subjective     History of Present Illness     Liliam Crooks is a 21 year old female with no pertinent past medical history.  Patient is admitted by OB/GYN.  She is a  33-week IUP who presented to the ER with complaints of fatigue, generalized weakness and cough.  She was found to have influenza A and tachycardia.  Patient states she was shopping on Thursday when she began to feel bad. She had shortness of breath and tachycardia.  She recently stopped vaping therefore she thought it was related to this however her symptoms worsened so she came to the ED.  She was found to have influenza A.  Denies any significant health history or family history of premature coronary artery disease.  EKG showed sinus tachycardia  106 bpm with nonspecific changes.    Cardiac risk factors:Negative    Past Medical History:   Diagnosis Date   • History of bronchitis    • History of ear infections      Past Surgical History:   Procedure Laterality Date   • ADENOIDECTOMY     • EAR TUBES       Family History   Problem Relation Age of Onset   • No Known Problems Mother    • Hypertension Father    • Diabetes Maternal Aunt    • Diabetes Paternal Aunt    • Cancer Paternal Grandmother      Social History     Tobacco Use   • Smoking status: Former Smoker   • Smokeless tobacco: Never Used   Vaping Use   • Vaping Use:  Every day   • Last attempt to quit: 12/12/2021   • Substances: Nicotine, Flavoring   • Devices: Disposable, Pre-filled or refillable cartridge   Substance Use Topics   • Alcohol use: No   • Drug use: No     Medications Prior to Admission   Medication Sig Dispense Refill Last Dose   • Prenatal Vit-Fe Fumarate-FA (prenatal vitamin 27-0.8) 27-0.8 MG tablet tablet Take  by mouth Daily.   12/17/2021 at 0900     Allergies:  Patient has no known allergies.    Review of Systems   Constitutional: Negative for chills, fatigue and fever.   HENT: Negative for congestion and trouble swallowing.    Eyes: Negative for photophobia and visual disturbance.   Respiratory: Positive for shortness of breath. Negative for chest tightness.    Cardiovascular: Positive for palpitations. Negative for chest pain.   Gastrointestinal: Negative for nausea and vomiting.   Endocrine: Negative for polyphagia and polyuria.   Genitourinary: Negative for dysuria and hematuria.   Musculoskeletal: Negative for neck pain and neck stiffness.   Skin: Negative for rash and wound.   Allergic/Immunologic: Negative for food allergies and immunocompromised state.   Neurological: Negative for dizziness and weakness.   Hematological: Negative for adenopathy. Does not bruise/bleed easily.   Psychiatric/Behavioral: Negative for confusion and suicidal ideas.       Objective       Objective      Vital Signs  Temp:  [98 °F (36.7 °C)-98.7 °F (37.1 °C)] 98 °F (36.7 °C)  Heart Rate:  [] 99  Resp:  [18-20] 20  BP: (111-140)/(45-72) 122/64     Vital Signs (last 72 hrs)       12/15 0700  12/16 0659 12/16 0700  12/17 0659 12/17 0700  12/18 0659 12/18 0700  12/18 0953   Most Recent      Temp (°F)     98.1 -  98.7      98     98 (36.7) 12/18 0807    Heart Rate     103 -  227      99     99 12/18 0807    Resp     18 -  20      20     20 12/18 0807    BP     111/60 -  140/72      122/64     122/64 12/18 0807    SpO2 (%)     97 -  99      96     96 12/18 0807        Body  mass index is 39.31 kg/m².  Documented weights    12/17/21 1550 12/17/21 2100 12/18/21 0500   Weight: 113 kg (248 lb 9.6 oz) 114 kg (251 lb) 114 kg (251 lb)            Intake/Output Summary (Last 24 hours) at 12/18/2021 0953  Last data filed at 12/17/2021 2300  Gross per 24 hour   Intake 1000 ml   Output 200 ml   Net 800 ml     Physical Exam  Constitutional:       General: She is not in acute distress.     Appearance: Normal appearance. She is well-developed and normal weight.   HENT:      Head: Normocephalic and atraumatic.   Eyes:      General: Lids are normal.      Conjunctiva/sclera: Conjunctivae normal.      Pupils: Pupils are equal, round, and reactive to light.   Neck:      Vascular: No carotid bruit or JVD.   Cardiovascular:      Rate and Rhythm: Regular rhythm. Tachycardia present.      Pulses: Normal pulses.      Heart sounds: Normal heart sounds, S1 normal and S2 normal. No murmur heard.      Pulmonary:      Effort: Pulmonary effort is normal. No respiratory distress.      Breath sounds: Normal breath sounds. No wheezing.   Abdominal:      General: Bowel sounds are normal. There is no distension.      Palpations: Abdomen is soft. There is no hepatomegaly or splenomegaly.      Tenderness: There is no abdominal tenderness.      Comments: Patient is 33 weeks pregnant   Musculoskeletal:         General: No swelling. Normal range of motion.      Cervical back: Normal range of motion and neck supple.      Right lower leg: No edema.      Left lower leg: No edema.   Skin:     General: Skin is warm and dry.      Coloration: Skin is not jaundiced.      Findings: No rash.   Neurological:      General: No focal deficit present.      Mental Status: She is alert and oriented to person, place, and time. Mental status is at baseline.   Psychiatric:         Mood and Affect: Mood normal.         Speech: Speech normal.         Behavior: Behavior normal.         Thought Content: Thought content normal.         Judgment:  Judgment normal.       Results review     Results Review:    I reviewed the patient's new clinical results.      Results from last 7 days   Lab Units 12/18/21  0519 12/17/21  1636   WBC 10*3/mm3 5.65 5.68   HEMOGLOBIN g/dL 10.2* 10.6*   PLATELETS 10*3/mm3 207 199     Results from last 7 days   Lab Units 12/18/21  0519 12/17/21  1636   SODIUM mmol/L 139 137   POTASSIUM mmol/L 3.6 3.6   CHLORIDE mmol/L 105 103   CO2 mmol/L 20.9* 20.0*   BUN mg/dL 4* 5*   CREATININE mg/dL 0.58 0.54*   CALCIUM mg/dL 8.5* 8.5*   GLUCOSE mg/dL 92 107*   ALT (SGPT) U/L 7 6   AST (SGOT) U/L 8 10     No results found for: INR  Lab Results   Component Value Date    MG 1.6 12/18/2021     Lab Results   Component Value Date    TSH 1.950 12/18/2021        ECG           ECG/EMG Results (last 24 hours)     Procedure Component Value Units Date/Time    ECG 12 Lead [[id number]] Collected: 12/17/21 1735     Updated: 12/17/21 2032     QT Interval 328 ms      QTC Interval 435 ms     Narrative:      Test Reason : tachycardia  Blood Pressure :   */*   mmHG  Vent. Rate : 106 BPM     Atrial Rate : 106 BPM     P-R Int : 136 ms          QRS Dur :  72 ms      QT Int : 328 ms       P-R-T Axes :  35  54  -8 degrees     QTc Int : 435 ms    Sinus tachycardia  Nonspecific T wave abnormality  Abnormal ECG  No previous ECGs available  Confirmed by Nicole Murphy ([id number]) on 12/17/2021 8:32:19 PM    Referred By: ZAKIYA           Confirmed By: Nicole Murphy          Imaging Results (Last 72 Hours)     ** No results found for the last 72 hours. **          I have discussed my impression and recommendations with the patient and family.    Thank you very much for asking us to be involved in this patient's care.  We will follow along with you.      Electronically signed by MONA Lombardi, 12/18/21, 9:53 AM EST.  Electronically signed by Chantal Gonzalez MD, 12/18/21, 1:36 PM EST.    Please note that portions of this note were completed with a voice recognition  program.

## 2021-12-18 NOTE — PROGRESS NOTES
[facility] HOSPITALIST PROGRESS NOTE     Patient Identification:  Name:  Liliam Crooks  Age:  21 y.o.  Sex:  female  :  2000  MRN:  [id number]  Visit Number:  [id number]  ROOM: [address] [place] [address]     Primary Care Provider:  Jennifer Torres MD    Length of stay in inpatient status:  1    Subjective     Chief Compliant:    Chief Complaint   Patient presents with   • Weakness - Generalized     c/o cough dizziness sore throat       History of Presenting Illness:    Patient denies any new problems. Patient noted feeling much better with improvement in shortness of breath.     ROS:  Otherwise 10 point ROS negative other than documented above in HPI.     Objective     Current Hospital Meds:oseltamivir, 75 mg, Oral, Q12H  prenatal vitamin 27-0.8, 1 tablet, Oral, Daily  sodium chloride, 10 mL, Intravenous, Q12H  [START ON 2021] vitamin B-12, 1,000 mcg, Oral, Daily    lactated ringers, 125 mL/hr, Last Rate: 125 mL/hr (21 1251)        Current Antimicrobial Therapy:  Anti-Infectives (From admission, onward)    Ordered     Dose/Rate Route Frequency Start Stop    21  oseltamivir (TAMIFLU) capsule 75 mg        Ordering Provider: Markos Mathew III, MD    75 mg Oral Every 12 Hours Scheduled 21 2100 21        Current Diuretic Therapy:  Diuretics (From admission, onward)    None        ----------------------------------------------------------------------------------------------------------------------  Vital Signs:  Temp:  [97.8 °F (36.6 °C)-98.7 °F (37.1 °C)] 97.8 °F (36.6 °C)  Heart Rate:  [] 103  Resp:  [18-20] 20  BP: (111-128)/(45-70) 117/55  SpO2:  [96 %-99 %] 97 %  on   ;   Device (Oxygen Therapy): room air  Body mass index is 39.31 kg/m².    Wt Readings from Last 3 Encounters:   21 114 kg (251 lb)   21 97.5 kg (215 lb)   10/30/18 88.7 kg (195 lb 9.6 oz) (97 %, Z= 1.91)*     * Growth percentiles are based on CDC (Girls, 2-20 Years) data.      Intake & Output (last 3 days)       12/15 0701  12/16 0700 12/16 0701  12/17 0700 12/17 0701 12/18 0700 12/18 0701 12/19 0700    P.O.    360    I.V. (mL/kg)    750 (6.6)    IV Piggyback   1000     Total Intake(mL/kg)   1000 (8.8) 1110 (9.7)    Urine (mL/kg/hr)   200 625 (0.6)    Total Output   200 625    Net   +800 +485                Diet Regular  ----------------------------------------------------------------------------------------------------------------------  Physical exam:  Constitutional:  Well-developed and well-nourished.  No respiratory distress.      HENT:  Head:  Normocephalic and atraumatic.  Mouth:  Moist mucous membranes.    Eyes:  Conjunctivae and EOM are normal. No scleral icterus.    Neck:  Neck supple.  No JVD present.    Cardiovascular:  Normal rate, regular rhythm and normal heart sounds with no murmur.  Pulmonary/Chest:  No respiratory distress, no wheezes, no crackles, with normal breath sounds and good air movement.  Abdominal:  Soft.  Bowel sounds are normal.  No distension and no tenderness.   Musculoskeletal:  No edema, no tenderness, and no deformity.  No red or swollen joints anywhere.    Neurological:  Alert and oriented to person, place, and time.  No cranial nerve deficit.  No tongue deviation.  No facial droop.  No slurred speech.   Skin:  Skin is warm and dry. No rash noted. No pallor.   Peripheral vascular:  Pulses in all 4 extremities with no clubbing, no cyanosis, no edema.  ----------------------------------------------------------------------------------------------------------------------  Tele:    ----------------------------------------------------------------------------------------------------------------------  Results from last 7 days   Lab Units 12/18/21  0519 12/17/21  1636   CRP mg/dL 2.24*  --    LACTATE mmol/L 0.6  --    WBC 10*3/mm3 5.65 5.68   HEMOGLOBIN g/dL 10.2* 10.6*   HEMATOCRIT % 31.4* 32.9*   MCV fL 89.0 89.2   MCHC g/dL 32.5 32.2   PLATELETS  10*3/mm3 207 199         Results from last 7 days   Lab Units 12/18/21  0519 12/17/21  1636   SODIUM mmol/L 139 137   POTASSIUM mmol/L 3.6 3.6   MAGNESIUM mg/dL 1.6  --    CHLORIDE mmol/L 105 103   CO2 mmol/L 20.9* 20.0*   BUN mg/dL 4* 5*   CREATININE mg/dL 0.58 0.54*   EGFR IF NONAFRICN AM mL/min/1.73 131 143   CALCIUM mg/dL 8.5* 8.5*   GLUCOSE mg/dL 92 107*   ALBUMIN g/dL 2.83* 2.88*   BILIRUBIN mg/dL 0.2 0.2   ALK PHOS U/L 109 114   AST (SGOT) U/L 8 10   ALT (SGPT) U/L 7 6   Estimated Creatinine Clearance: 200.1 mL/min (by C-G formula based on SCr of 0.58 mg/dL).  No results found for: AMMONIA              Hemoglobin A1C   Date/Time Value Ref Range Status   12/18/2021 0519 5.30 4.80 - 5.60 % Final     Lab Results   Component Value Date    TSH 1.950 12/18/2021    FREET4 0.84 (L) 12/18/2021     Lab Results   Component Value Date    HCGQUANT 4,235.00 06/02/2021     Pain Management Panel     Pain Management Panel Latest Ref Rng & Units 12/17/2021    AMPHETAMINES SCREEN, URINE Negative Negative    BARBITURATES SCREEN Negative Negative    BENZODIAZEPINE SCREEN, URINE Negative Negative    BUPRENORPHINEUR Negative Negative    COCAINE SCREEN, URINE Negative Negative    METHADONE SCREEN, URINE Negative Negative    METHAMPHETAMINEUR Negative Negative        Brief Urine Lab Results  (Last result in the past 365 days)      Color   Clarity   Blood   Leuk Est   Nitrite   Protein   CREAT   Urine HCG        12/17/21 2303 Yellow   Cloudy   Negative   Negative   Negative   Negative               No results found for: BLOODCX  No results found for: URINECX  No results found for: WOUNDCX  No results found for: STOOLCX  No results found for: RESPCX  No results found for: AFBCX  Results from last 7 days   Lab Units 12/18/21  0519   PROCALCITONIN ng/mL 0.13   LACTATE mmol/L 0.6   CRP mg/dL 2.24*       I have personally looked at the labs and they are summarized  above.  ----------------------------------------------------------------------------------------------------------------------  Detailed radiology reports for the last 24 hours:    Imaging Results (Last 24 Hours)     Procedure Component Value Units Date/Time    US Fetal Biophysical Profile;Without Non-Stress Testing [[id number]] Collected: 21     Updated: 21    Narrative:      US Fetal Biophysical Profile    INDICATION:   Maternal tachycardia.    TECHNIQUE:   Transabdominal ultrasound of the pelvis.    COMPARISON:   2021    FINDINGS:  FETAL BIOPHYSICAL PROFILE: Single, live intrauterine gestation in the cephalic position. Estimated gestational age of 33 weeks and 2 days. Fetal heart rate of 139.8 bpm fetal weight of 5 pounds and 13 ounces. The placenta is posterior.    BIOPHYSICAL PROFILE:  Fetal Breathing Movements (FBM): 2  Gross Body Movements (GBM): 2  Fetal Tone (FT): 2  Amniotic Fluid Volume (AFV): 2  TOTAL SCORE: 8/8        Impression:      1.  Normal fetal biophysical profile of 8/8.    Signer Name: Lon Rodas MD   Signed: 2021 12:27 PM   Workstation Name: [id number]    [facility]        Assessment & Plan    #Acute influenza A  - Procal wnl. Afebrile. Improving. Saturating on room air.   - Day 2 of tamiflu. Hold abx at this juncture, no evidence of concomitant bacterial infection.     #Sinus tachycardia  #Nonspecific ST changed on EKG  - Likely 2/2 above flu. Improved. Cardiology consulted by primary team. Echo pending.     #33 weeks IUP,   - Managed by primary team     #GERD  - Tums and maalox ordered by primary     #Normocytic anemia   #Vitamin B12 deficiency.   - Unclear acuity. Not iron deficent. Folate wnl. Vitamin B12 low, will replace. Suspect from increased blood volume related to pregnancy.     #History of electronic cigarette use   - Recommend cessation.     DVT prophylaxis  - None currently ordered. Will place on SCDs. Primary  team can decide if chemical prophylaxis is warranted.     Thank you for allowing us to take part in patient's care. Please call with any questions.       VTE Prophylaxis:   Mechanical Order History:      Ordered        12/18/21 1651  Place Sequential Compression Device  Once            12/18/21 1651  Maintain Sequential Compression Device  Continuous                    Pharmalogical Order History:     None            Jerrod Shen MD  [facility]  12/18/21  16:53 EST

## 2021-12-18 NOTE — PLAN OF CARE
Goal Outcome Evaluation:      Patient was a transfer from L&D last night. She had no complaints of chest pain or shortness of breath. Her heart rate was tachycardic in the 100-110's. She does not appear to be in any distressed. Educated on calling for help before getting out of bed. Fluids have been running throughout the night. Will continue to monitor and follow plan of care.

## 2021-12-18 NOTE — NURSING NOTE
Talked with Dr Cornejo and Dr Mathew on phone.Cardiology cleared from echo and can be discharged with follow up.Dr Mathew nitified and orders noted for d/c home will [id number] up in 1 week and prescription sent for tamiflu

## 2021-12-18 NOTE — CONSULTS
"                           [facility] Hospital Medicine Services  CONSULT NOTE     Inpatient Hospitalist Consult  Consult performed by: Cornelia Armenta PA-C  Consult ordered by: Markos Mathew III, MD          Patient Identification:  Name:  Liliam Crooks  Age:  21 y.o.  Sex:  female  :  2000  MRN:  [id number]  Visit Number:  [id number]  Primary care provider:  Jennifer Torres MD    Length of stay:  0    Subjective     Chief Complaint:  Fatigue and cough    History of presenting illness:     Liliam Crooks is a 21 y.o. female admitted by OB/GYN, Dr. Mathew.  Hospitalist services were consulted for Influzena A and tachycardia.      Ms. Crooks is  33 week IUP who presented to the [facility] for fatigue, generalized weakness, and cough.  She has no significant past medical history and states her pregnancy overall has been \"unremarkable.\"  She denies any gestational diabetes or hypertension.  She reports she quit vaping on  (2021) and stated on Wednesday she started \"feeling bad\" with dizziness and fatigue.  She initially thought she was having withdrawal symptoms since she quit vaping but then developed a sore throat, cough, and congestion.  She called her OB who told her to come to the ED to be further evaluated.  She reports she has been experiencing intermittent abdominal cramping described as a \"tightening.\"  She states these have not been persistent but rather intermittent/sporadic.  She denies any back pain, vaginal bleeding, rupture of membranes, documented fever, diaphoresis, wheezing, chest pain, diarrhea, dysuria, malodorous urine, or syncope. She reports having shortness of breath but states this has been an ongoing problem and feels it is related to her pregnancy. She has been experiencing intermittent palpitations, mainly when ambulating.  She also admits to having persistent heartburn that is not resolved with taking Tums.  She is currently " not taking any prescribed medications other than a daily prenatal vitamin. She has been performing kick counts and states the baby is moving.    ---------------------------------------------------------------------------------------------------------------------  Review of Systems   Constitutional: Positive for chills and fatigue. Negative for diaphoresis and fever.   HENT: Positive for congestion. Negative for sore throat.    Respiratory: Positive for cough and shortness of breath. Negative for wheezing.    Cardiovascular: Negative for chest pain, palpitations and leg swelling.   Gastrointestinal: Positive for abdominal pain (intermittent abdominal cramping ). Negative for constipation, diarrhea, nausea and vomiting.   Genitourinary: Negative for dysuria, frequency and vaginal discharge.   Musculoskeletal: Negative for back pain and gait problem.   Skin: Negative for rash and wound.   Neurological: Positive for dizziness and weakness (generalized). Negative for syncope and light-headedness.   Psychiatric/Behavioral: Negative for confusion. The patient is not nervous/anxious.         Otherwise 10-system ROS reviewed and is negative except as mentioned in the HPI.  ---------------------------------------------------------------------------------------------------------------------   Past History:  Past Medical History:   Diagnosis Date   • History of bronchitis    • History of ear infections      Past Surgical History:   Procedure Laterality Date   • ADENOIDECTOMY     • EAR TUBES       Family History   Problem Relation Age of Onset   • No Known Problems Mother    • Hypertension Father    • Diabetes Maternal Aunt    • Diabetes Paternal Aunt    • Cancer Paternal Grandmother      Social History     Socioeconomic History   • Marital status: Single   Tobacco Use   • Smoking status: Former Smoker   • Smokeless tobacco: Never Used   Vaping Use   • Vaping Use: Every day   • Last attempt to quit: 12/12/2021   • Substances:  Nicotine, Flavoring   • Devices: Disposable, Pre-filled or refillable cartridge   Substance and Sexual Activity   • Alcohol use: No   • Drug use: No     ---------------------------------------------------------------------------------------------------------------------   Allergies:  Patient has no known allergies.  ---------------------------------------------------------------------------------------------------------------------   Prior to Admission Medications     Prescriptions Last Dose Informant Patient Reported? Taking?    Prenatal Vit-Fe Fumarate-FA (prenatal vitamin 27-0.8) 27-0.8 MG tablet tablet 12/17/2021 Self Yes Yes    Take  by mouth Daily.        ---------------------------------------------------------------------------------------------------------------------     Objective      Procedures:  N/A    Hospital Meds:  oseltamivir, 75 mg, Oral, Q12H  [START ON 12/18/2021] prenatal vitamin 27-0.8, 1 tablet, Oral, Daily  sodium chloride, 10 mL, Intravenous, Q12H      lactated ringers, 100 mL/hr, Last Rate: 100 mL/hr (12/17/21 1930)      ---------------------------------------------------------------------------------------------------------------------   Vital Signs:  Temp:  [98.1 °F (36.7 °C)-98.7 °F (37.1 °C)] 98.1 °F (36.7 °C)  Heart Rate:  [103-227] 111  Resp:  [18] 18  BP: (123-140)/(54-72) 123/56  Mean Arterial Pressure (Non-Invasive) for the past 24 hrs (Last 3 readings):   Noninvasive MAP (mmHg)   12/17/21 2100 90     SpO2 Percentage    12/2000 12/17/21 2005 12/17/21 2100   SpO2: 99% 99% 99%     SpO2:  [97 %-99 %] 99 %  on   ;   Device (Oxygen Therapy): room air    Body mass index is 39.31 kg/m².  Wt Readings from Last 3 Encounters:   12/17/21 114 kg (251 lb)   06/02/21 97.5 kg (215 lb)   10/30/18 88.7 kg (195 lb 9.6 oz) (97 %, Z= 1.91)*     * Growth percentiles are based on CDC (Girls, 2-20 Years) data.        Intake/Output Summary (Last 24 hours) at 12/17/2021 2147  Last data filed at  12/17/2021 1840  Gross per 24 hour   Intake 1000 ml   Output --   Net 1000 ml     Diet Regular  ---------------------------------------------------------------------------------------------------------------------   Physical exam:  Physical Exam  Constitutional:       General: She is awake. She is not in acute distress.     Appearance: Normal appearance. She is well-developed.   HENT:      Head: Normocephalic and atraumatic.   Eyes:      General: Lids are normal.         Right eye: No discharge.         Left eye: No discharge.   Cardiovascular:      Rate and Rhythm: Regular rhythm. Tachycardia present.      Pulses: Normal pulses.           Dorsalis pedis pulses are 2+ on the right side and 2+ on the left side.        Posterior tibial pulses are 2+ on the right side and 2+ on the left side.      Heart sounds: Normal heart sounds. No murmur heard.  No friction rub. No gallop.    Pulmonary:      Effort: Pulmonary effort is normal. No tachypnea.      Breath sounds: Normal breath sounds. No decreased breath sounds, wheezing, rhonchi or rales.   Abdominal:      General: There is distension (33 week pregnant).      Comments: Patient is 33 weeks pregnant; abdomen is soft but firm in LUQ where baby is positioned    Musculoskeletal:      Right lower leg: No edema.      Left lower leg: No edema.   Skin:     Findings: No abrasion, ecchymosis or erythema.   Neurological:      General: No focal deficit present.      Mental Status: She is alert and oriented to person, place, and time. Mental status is at baseline.   Psychiatric:         Mood and Affect: Mood normal.         Speech: Speech normal.         Behavior: Behavior is cooperative.       ---------------------------------------------------------------------------------------------------------------------   EKG:   Per cardiology read by Dr. Murphy, sinus tachycardia with nonspecific T wave abnormality, heart rate one oh six, QTc four thirty-five MS.  No prior EKGs to compare  to    Telemetry:  Sinus tachycardia, heart rate 114, SPO2 99% room air    I have personally reviewed the EKG/Telemetry strips.   ---------------------------------------------------------------------------------------------------------------------             Results from last 7 days   Lab Units 21  1636   WBC 10*3/mm3 5.68   HEMOGLOBIN g/dL 10.6*   HEMATOCRIT % 32.9*   MCV fL 89.2   MCHC g/dL 32.2   PLATELETS 10*3/mm3 199     Results from last 7 days   Lab Units 21  1636   SODIUM mmol/L 137   POTASSIUM mmol/L 3.6   CHLORIDE mmol/L 103   CO2 mmol/L 20.0*   BUN mg/dL 5*   CREATININE mg/dL 0.54*   EGFR IF NONAFRICN AM mL/min/1.73 143   CALCIUM mg/dL 8.5*   GLUCOSE mg/dL 107*   ALBUMIN g/dL 2.88*   BILIRUBIN mg/dL 0.2   ALK PHOS U/L 114   AST (SGOT) U/L 10   ALT (SGPT) U/L 6   Estimated Creatinine Clearance: 214.9 mL/min (A) (by C-G formula based on SCr of 0.54 mg/dL (L)).    Pain Management Panel    There is no flowsheet data to display.       I have personally reviewed the above laboratory results.   ---------------------------------------------------------------------------------------------------------------------  Imaging Results (Last 7 Days)     ** No results found for the last 168 hours. **        I have personally reviewed the above radiology results.   ----------------------------------------------------------------------------------------------------------------------    Assessment/Plan     #Influenza A POA  #Sinus tachycardia   #33 weeks IUP,   -Patient is positive for influenza A; Tamiflu has already been ordered per primary  -Lungs are clear to auscultation on exam; suspicion is low for superimposed bacterial pneumonia; O2 saturation is 99% on room air  -Procalcitonin and respiratory culture ordered   -Continue supportive care for influenza A with Robitussin  -In regards to her sinus tachycardia, suspect multifactorial in setting of influenza A and pregnancy in the third  trimester  -Suspicion is low for PE as O2 saturation has remained adequate on room air and heart rate improved with IV fluids; no lower extremity edema/erythema on exam to suggest DVT; D-dimer would likely be skewed in setting of pregnancy  -LR rate increased to 125 mL/h  -We will go ahead and obtain TTE, UDS, UA  -Per primary, cardiology has been consulted; input/assistance is much appreciated  -TSH and free T4 ordered as well  -Repeat a.m. CBC; obtain lactate and CRP  -Continue to monitor closely on telemetry  -Encouraged patient to perform hourly kick counts; also informed her if she should develop any abdominal cramping/tightening, back pain, or vaginal discharge to inform us; daily NST ordered per primary   -Continue daily prenatal vitamin  -APAP as needed for pain/fever     #GERD  -Tums and Maalox ordered    #Mild hyperglycemia with no known history of DM   -Obtain hemoglobin A1c  -States she passed her OP glucose tolerance test     #Hypoalbuminemia   -Likely multifactorial   -Continue prenatal vitamin    #Acute normocytic anemia   -H&H stable  - Obtain vitamin B12, folate, ferritin, iron, iron panel; replace as necessary  -Repeat a.m. CBC monitor H&H closely  -If hemoglobin less than 7 or platelets less than 50,000, will transfuse     #History of smoking tobacco use and electronic cigarette use   -Patient admits to quitting vaping on 12/12/2021  -Encouraged continued smoking cessation      F/E/N:  mL/h. Replace electrolytes as necessary. Regular diet.   -----------------------  Thank you for the consult and allowing us to participate in the care of your patient, Hospitalist Services will continue to follow. Please do not hesitate to call with any questions or concerns.      Cornelia Armenta PA-C  Hospitalist Service -- [facility]       12/17/21  21:44 EST    Attestation: I personally discussed the patient's history of presenting illness, physical examination, assessment, and plan with my  attending physician, Dr. Jerry MD who also examined the patient on day of consultation.

## 2021-12-18 NOTE — PLAN OF CARE
Goal Outcome Evaluation:         Pt has had a noneventful day. Good urine output and appetite. Pt did request tylenol for toothache. HR has been in the 90s-100s with one brief episode in the eight o'clock hour of 120s. Pt has not had any complaint other than the toothache. An echo is scheduled for today. I called and they said they would be up sometime today. Will  continue to monitor and follow plan of care.

## 2021-12-18 NOTE — NURSING NOTE
"Spoke to Sincere BARGER to clarify moving Pt to L&D because there is no transfer order in. He stated he talked to all the doctors so \"move her.\" I will call report and move Pt.   "

## 2021-12-19 LAB
BACTERIA SPEC AEROBE CULT: NORMAL
BACTERIA SPEC AEROBE CULT: NORMAL

## 2021-12-22 NOTE — DISCHARGE SUMMARY
Discharge Summary    Admit Date: 12/17/2021  3:50 PM    Admit Diagnosis: Weakness [R53.1]  Influenza A [J10.1]  Tachycardia [R00.0]    Date of Discharge:  12/22/2021    Discharge Diagnosis: Same        Hospital Course  Patient is a 21 y.o. female, G1, P0. Patient was admitted on 12-17 secondary to tachycardia & flu. Patient doing better. Symptoms have improved. Patient tolerating a regular diet and ambulating without difficulty. Will discharge to home today.         Vital Signs       Review of Systems    The following systems were reviewed and negative;  ENT, respiratory, cardiovascular, gastrointestinal, genitourinary, breast, endocrine and allergies / immunologic.      Physical Exam:      General Appearance:    Alert, cooperative, in no acute distress   Head:    Normocephalic, without obvious abnormality, atraumatic   Eyes:            Lids and lashes normal, conjunctivae and sclerae normal, no   icterus, no pallor, corneas clear, PERRLA   Ears:    Ears appear intact with no abnormalities noted   Throat:   No oral lesions, no thrush, oral mucosa moist   Neck:   No adenopathy, supple, trachea midline, no thyromegaly, no     carotid bruit, no JVD   Back:     No kyphosis present, no scoliosis present, no skin lesions,       erythema or scars, no tenderness to percussion or                   palpation,   range of motion normal   Lungs:     Clear to auscultation,respirations regular, even and                   unlabored    Heart:    Regular rhythm and normal rate, normal S1 and S2, no            murmur, no gallop, no rub, no click   Breast Exam:    Deferred   Abdomen:     Normal bowel sounds, no masses, no organomegaly, soft        non-tender, non-distended, no guarding, no rebound                 tenderness   Genitalia:    Deferred   Extremities:   Moves all extremities well, no edema, no cyanosis, no              redness   Pulses:   Pulses palpable and equal bilaterally   Skin:   No bleeding, bruising or rash   Lymph  nodes:   No palpable adenopathy   Neurologic:   Cranial nerves 2 - 12 grossly intact, sensation intact, DTR        present and equal bilaterally             Condition on Discharge:  Stable    Urine Output Good    Discharge Diet: Regular    Follow-up Appointments  Patient will follow up in clinic this week.        Markos Mathew MD.   12/22/21  10:32 EST

## 2022-01-14 LAB — EXTERNAL GROUP B STREP ANTIGEN: NEGATIVE

## 2022-01-26 ENCOUNTER — TRANSCRIBE ORDERS (OUTPATIENT)
Dept: LAB | Facility: HOSPITAL | Age: 22
End: 2022-01-26

## 2022-01-26 ENCOUNTER — HOSPITAL ENCOUNTER (INPATIENT)
Facility: HOSPITAL | Age: 22
LOS: 3 days | Discharge: HOME | End: 2022-01-29
Attending: OBSTETRICS & GYNECOLOGY | Admitting: OBSTETRICS & GYNECOLOGY

## 2022-01-26 ENCOUNTER — LAB (OUTPATIENT)
Dept: LAB | Facility: HOSPITAL | Age: 22
End: 2022-01-26

## 2022-01-26 ENCOUNTER — HOSPITAL ENCOUNTER (OUTPATIENT)
Dept: LABOR AND DELIVERY | Facility: HOSPITAL | Age: 22
Discharge: HOME | End: 2022-01-26

## 2022-01-26 DIAGNOSIS — Z01.818 OTHER SPECIFIED PRE-OPERATIVE EXAMINATION: ICD-10-CM

## 2022-01-26 DIAGNOSIS — Z01.818 OTHER SPECIFIED PRE-OPERATIVE EXAMINATION: Primary | ICD-10-CM

## 2022-01-26 PROBLEM — Z34.90 PREGNANCY: Status: ACTIVE | Noted: 2022-01-26

## 2022-01-26 LAB
ABO GROUP BLD: NORMAL
AMPHET+METHAMPHET UR QL: NEGATIVE
AMPHETAMINES UR QL: NEGATIVE
BARBITURATES UR QL SCN: NEGATIVE
BASOPHILS # BLD AUTO: 0.01 10*3/MM3 (ref 0–0.2)
BASOPHILS NFR BLD AUTO: 0.1 % (ref 0–1.5)
BENZODIAZ UR QL SCN: NEGATIVE
BLD GP AB SCN SERPL QL: NEGATIVE
BUPRENORPHINE SERPL-MCNC: NEGATIVE NG/ML
C TRACH RRNA CVX QL NAA+PROBE: NOT DETECTED
CANNABINOIDS SERPL QL: NEGATIVE
COCAINE UR QL: NEGATIVE
DEPRECATED RDW RBC AUTO: 45 FL (ref 37–54)
EOSINOPHIL # BLD AUTO: 0.04 10*3/MM3 (ref 0–0.4)
EOSINOPHIL NFR BLD AUTO: 0.4 % (ref 0.3–6.2)
ERYTHROCYTE [DISTWIDTH] IN BLOOD BY AUTOMATED COUNT: 14.5 % (ref 12.3–15.4)
FLUAV RNA RESP QL NAA+PROBE: NOT DETECTED
FLUBV RNA RESP QL NAA+PROBE: NOT DETECTED
HCT VFR BLD AUTO: 36.5 % (ref 34–46.6)
HGB BLD-MCNC: 11.8 G/DL (ref 12–15.9)
IMM GRANULOCYTES # BLD AUTO: 0.03 10*3/MM3 (ref 0–0.05)
IMM GRANULOCYTES NFR BLD AUTO: 0.3 % (ref 0–0.5)
LYMPHOCYTES # BLD AUTO: 1.81 10*3/MM3 (ref 0.7–3.1)
LYMPHOCYTES NFR BLD AUTO: 17.9 % (ref 19.6–45.3)
MCH RBC QN AUTO: 28 PG (ref 26.6–33)
MCHC RBC AUTO-ENTMCNC: 32.3 G/DL (ref 31.5–35.7)
MCV RBC AUTO: 86.5 FL (ref 79–97)
METHADONE UR QL SCN: NEGATIVE
MONOCYTES # BLD AUTO: 1.14 10*3/MM3 (ref 0.1–0.9)
MONOCYTES NFR BLD AUTO: 11.3 % (ref 5–12)
N GONORRHOEA RRNA SPEC QL NAA+PROBE: NOT DETECTED
NEUTROPHILS NFR BLD AUTO: 7.06 10*3/MM3 (ref 1.7–7)
NEUTROPHILS NFR BLD AUTO: 70 % (ref 42.7–76)
NRBC BLD AUTO-RTO: 0 /100 WBC (ref 0–0.2)
OPIATES UR QL: NEGATIVE
OXYCODONE UR QL SCN: NEGATIVE
PCP UR QL SCN: NEGATIVE
PLATELET # BLD AUTO: 266 10*3/MM3 (ref 140–450)
PMV BLD AUTO: 9.3 FL (ref 6–12)
PROPOXYPH UR QL: NEGATIVE
RBC # BLD AUTO: 4.22 10*6/MM3 (ref 3.77–5.28)
RH BLD: NEGATIVE
SARS-COV-2 RNA RESP QL NAA+PROBE: NOT DETECTED
T&S EXPIRATION DATE: NORMAL
TRICHOMONAS VAGINALIS PCR: NOT DETECTED
TRICYCLICS UR QL SCN: NEGATIVE
WBC NRBC COR # BLD: 10.09 10*3/MM3 (ref 3.4–10.8)

## 2022-01-26 PROCEDURE — 80306 DRUG TEST PRSMV INSTRMNT: CPT | Performed by: OBSTETRICS & GYNECOLOGY

## 2022-01-26 PROCEDURE — 86901 BLOOD TYPING SEROLOGIC RH(D): CPT | Performed by: OBSTETRICS & GYNECOLOGY

## 2022-01-26 PROCEDURE — 85025 COMPLETE CBC W/AUTO DIFF WBC: CPT | Performed by: OBSTETRICS & GYNECOLOGY

## 2022-01-26 PROCEDURE — C9803 HOPD COVID-19 SPEC COLLECT: HCPCS

## 2022-01-26 PROCEDURE — 87591 N.GONORRHOEAE DNA AMP PROB: CPT | Performed by: OBSTETRICS & GYNECOLOGY

## 2022-01-26 PROCEDURE — 87491 CHLMYD TRACH DNA AMP PROBE: CPT | Performed by: OBSTETRICS & GYNECOLOGY

## 2022-01-26 PROCEDURE — 87636 SARSCOV2 & INF A&B AMP PRB: CPT | Performed by: OBSTETRICS & GYNECOLOGY

## 2022-01-26 PROCEDURE — 87661 TRICHOMONAS VAGINALIS AMPLIF: CPT | Performed by: OBSTETRICS & GYNECOLOGY

## 2022-01-26 PROCEDURE — 86900 BLOOD TYPING SEROLOGIC ABO: CPT | Performed by: OBSTETRICS & GYNECOLOGY

## 2022-01-26 PROCEDURE — 59025 FETAL NON-STRESS TEST: CPT

## 2022-01-26 PROCEDURE — 86850 RBC ANTIBODY SCREEN: CPT | Performed by: OBSTETRICS & GYNECOLOGY

## 2022-01-26 RX ORDER — SODIUM CHLORIDE 0.9 % (FLUSH) 0.9 %
3-10 SYRINGE (ML) INJECTION AS NEEDED
Status: DISCONTINUED | OUTPATIENT
Start: 2022-01-26 | End: 2022-01-28 | Stop reason: HOSPADM

## 2022-01-26 RX ORDER — ACETAMINOPHEN 325 MG/1
650 TABLET ORAL EVERY 4 HOURS PRN
Status: DISCONTINUED | OUTPATIENT
Start: 2022-01-26 | End: 2022-01-28 | Stop reason: HOSPADM

## 2022-01-26 RX ORDER — ONDANSETRON 4 MG/1
4 TABLET, FILM COATED ORAL EVERY 6 HOURS PRN
Status: DISCONTINUED | OUTPATIENT
Start: 2022-01-26 | End: 2022-01-28 | Stop reason: HOSPADM

## 2022-01-26 RX ORDER — FAMOTIDINE 20 MG/1
20 TABLET, FILM COATED ORAL EVERY 12 HOURS SCHEDULED
Status: DISCONTINUED | OUTPATIENT
Start: 2022-01-26 | End: 2022-01-28 | Stop reason: HOSPADM

## 2022-01-26 RX ORDER — MAGNESIUM HYDROXIDE 1200 MG/15ML
1000 LIQUID ORAL ONCE AS NEEDED
Status: DISCONTINUED | OUTPATIENT
Start: 2022-01-26 | End: 2022-01-27

## 2022-01-26 RX ORDER — BUTORPHANOL TARTRATE 1 MG/ML
1 INJECTION, SOLUTION INTRAMUSCULAR; INTRAVENOUS
Status: DISCONTINUED | OUTPATIENT
Start: 2022-01-26 | End: 2022-01-28 | Stop reason: HOSPADM

## 2022-01-26 RX ORDER — TERBUTALINE SULFATE 1 MG/ML
0.25 INJECTION, SOLUTION SUBCUTANEOUS AS NEEDED
Status: DISCONTINUED | OUTPATIENT
Start: 2022-01-26 | End: 2022-01-28 | Stop reason: HOSPADM

## 2022-01-26 RX ORDER — FAMOTIDINE 10 MG/ML
20 INJECTION, SOLUTION INTRAVENOUS EVERY 12 HOURS SCHEDULED
Status: DISCONTINUED | OUTPATIENT
Start: 2022-01-26 | End: 2022-01-28 | Stop reason: HOSPADM

## 2022-01-26 RX ORDER — MISOPROSTOL 100 UG/1
25 TABLET ORAL
Status: COMPLETED | OUTPATIENT
Start: 2022-01-27 | End: 2022-01-27

## 2022-01-26 RX ORDER — OXYTOCIN-SODIUM CHLORIDE 0.9% IV SOLN 30 UNIT/500ML 30-0.9/5 UT/ML-%
2-20 SOLUTION INTRAVENOUS
Status: DISCONTINUED | OUTPATIENT
Start: 2022-01-26 | End: 2022-01-28 | Stop reason: HOSPADM

## 2022-01-26 RX ORDER — HYDROXYZINE 50 MG/1
50 TABLET, FILM COATED ORAL NIGHTLY PRN
Status: DISCONTINUED | OUTPATIENT
Start: 2022-01-26 | End: 2022-01-28 | Stop reason: HOSPADM

## 2022-01-26 RX ORDER — SODIUM CHLORIDE, SODIUM LACTATE, POTASSIUM CHLORIDE, CALCIUM CHLORIDE 600; 310; 30; 20 MG/100ML; MG/100ML; MG/100ML; MG/100ML
125 INJECTION, SOLUTION INTRAVENOUS CONTINUOUS
Status: DISCONTINUED | OUTPATIENT
Start: 2022-01-26 | End: 2022-01-28

## 2022-01-26 RX ORDER — ONDANSETRON 2 MG/ML
4 INJECTION INTRAMUSCULAR; INTRAVENOUS EVERY 6 HOURS PRN
Status: DISCONTINUED | OUTPATIENT
Start: 2022-01-26 | End: 2022-01-28 | Stop reason: HOSPADM

## 2022-01-26 RX ADMIN — SODIUM CHLORIDE, POTASSIUM CHLORIDE, SODIUM LACTATE AND CALCIUM CHLORIDE 125 ML/HR: 600; 310; 30; 20 INJECTION, SOLUTION INTRAVENOUS at 22:08

## 2022-01-27 ENCOUNTER — ANESTHESIA (OUTPATIENT)
Dept: LABOR AND DELIVERY | Facility: HOSPITAL | Age: 22
End: 2022-01-27

## 2022-01-27 ENCOUNTER — ANESTHESIA EVENT (OUTPATIENT)
Dept: LABOR AND DELIVERY | Facility: HOSPITAL | Age: 22
End: 2022-01-27

## 2022-01-27 LAB
BASOPHILS # BLD AUTO: 0.02 10*3/MM3 (ref 0–0.2)
BASOPHILS NFR BLD AUTO: 0.1 % (ref 0–1.5)
DEPRECATED RDW RBC AUTO: 45.5 FL (ref 37–54)
EOSINOPHIL # BLD AUTO: 0.01 10*3/MM3 (ref 0–0.4)
EOSINOPHIL NFR BLD AUTO: 0.1 % (ref 0.3–6.2)
ERYTHROCYTE [DISTWIDTH] IN BLOOD BY AUTOMATED COUNT: 14.5 % (ref 12.3–15.4)
HCT VFR BLD AUTO: 37.3 % (ref 34–46.6)
HGB BLD-MCNC: 12.2 G/DL (ref 12–15.9)
IMM GRANULOCYTES # BLD AUTO: 0.08 10*3/MM3 (ref 0–0.05)
IMM GRANULOCYTES NFR BLD AUTO: 0.5 % (ref 0–0.5)
LYMPHOCYTES # BLD AUTO: 0.98 10*3/MM3 (ref 0.7–3.1)
LYMPHOCYTES NFR BLD AUTO: 6.1 % (ref 19.6–45.3)
MCH RBC QN AUTO: 28.4 PG (ref 26.6–33)
MCHC RBC AUTO-ENTMCNC: 32.7 G/DL (ref 31.5–35.7)
MCV RBC AUTO: 86.7 FL (ref 79–97)
MONOCYTES # BLD AUTO: 1.47 10*3/MM3 (ref 0.1–0.9)
MONOCYTES NFR BLD AUTO: 9.2 % (ref 5–12)
NEUTROPHILS NFR BLD AUTO: 13.38 10*3/MM3 (ref 1.7–7)
NEUTROPHILS NFR BLD AUTO: 84 % (ref 42.7–76)
NRBC BLD AUTO-RTO: 0 /100 WBC (ref 0–0.2)
PLATELET # BLD AUTO: 249 10*3/MM3 (ref 140–450)
PMV BLD AUTO: 9.4 FL (ref 6–12)
RBC # BLD AUTO: 4.3 10*6/MM3 (ref 3.77–5.28)
WBC NRBC COR # BLD: 15.94 10*3/MM3 (ref 3.4–10.8)

## 2022-01-27 PROCEDURE — 85025 COMPLETE CBC W/AUTO DIFF WBC: CPT | Performed by: OBSTETRICS & GYNECOLOGY

## 2022-01-27 PROCEDURE — 25010000002 PROPOFOL 10 MG/ML EMULSION: Performed by: ANESTHESIOLOGY

## 2022-01-27 PROCEDURE — 25010000002 HYDROMORPHONE HCL-NACL 30-0.9 MG/30ML-% SOLUTION PREFILLED SYRINGE: Performed by: OBSTETRICS & GYNECOLOGY

## 2022-01-27 PROCEDURE — C1755 CATHETER, INTRASPINAL: HCPCS | Performed by: NURSE ANESTHETIST, CERTIFIED REGISTERED

## 2022-01-27 PROCEDURE — 3E0P7VZ INTRODUCTION OF HORMONE INTO FEMALE REPRODUCTIVE, VIA NATURAL OR ARTIFICIAL OPENING: ICD-10-PCS | Performed by: OBSTETRICS & GYNECOLOGY

## 2022-01-27 PROCEDURE — 25010000002 AZITHROMYCIN PER 500 MG: Performed by: OBSTETRICS & GYNECOLOGY

## 2022-01-27 PROCEDURE — 0 CEFAZOLIN SODIUM-DEXTROSE 2-3 GM-%(50ML) RECONSTITUTED SOLUTION: Performed by: OBSTETRICS & GYNECOLOGY

## 2022-01-27 PROCEDURE — 25010000002 FENTANYL CITRATE (PF) 50 MCG/ML SOLUTION: Performed by: NURSE ANESTHETIST, CERTIFIED REGISTERED

## 2022-01-27 PROCEDURE — C1755 CATHETER, INTRASPINAL: HCPCS

## 2022-01-27 RX ORDER — LIDOCAINE HYDROCHLORIDE 10 MG/ML
INJECTION, SOLUTION EPIDURAL; INFILTRATION; INTRACAUDAL; PERINEURAL
Status: DISCONTINUED
Start: 2022-01-27 | End: 2022-01-29 | Stop reason: HOSPADM

## 2022-01-27 RX ORDER — CARBOPROST TROMETHAMINE 250 UG/ML
250 INJECTION, SOLUTION INTRAMUSCULAR AS NEEDED
Status: DISCONTINUED | OUTPATIENT
Start: 2022-01-27 | End: 2022-01-28 | Stop reason: HOSPADM

## 2022-01-27 RX ORDER — CEFAZOLIN SODIUM 2 G/50ML
2 SOLUTION INTRAVENOUS ONCE
Status: COMPLETED | OUTPATIENT
Start: 2022-01-27 | End: 2022-01-27

## 2022-01-27 RX ORDER — ONDANSETRON 2 MG/ML
4 INJECTION INTRAMUSCULAR; INTRAVENOUS EVERY 6 HOURS PRN
Status: DISCONTINUED | OUTPATIENT
Start: 2022-01-27 | End: 2022-01-28 | Stop reason: HOSPADM

## 2022-01-27 RX ORDER — GLYCERIN/PROPYLENE GLYCOL/WATR
1 SOLUTION, NON-ORAL VAGINAL AS NEEDED
Status: DISCONTINUED | OUTPATIENT
Start: 2022-01-27 | End: 2022-01-28

## 2022-01-27 RX ORDER — SODIUM CHLORIDE 0.9 % (FLUSH) 0.9 %
3-10 SYRINGE (ML) INJECTION AS NEEDED
Status: DISCONTINUED | OUTPATIENT
Start: 2022-01-27 | End: 2022-01-28 | Stop reason: HOSPADM

## 2022-01-27 RX ORDER — FENTANYL CIT 0.2 MG/100ML-ROPIV 0.2%-NACL 0.9% EPIDURAL INJ 2/0.2 MCG/ML-%
14 SOLUTION INJECTION CONTINUOUS
Status: DISCONTINUED | OUTPATIENT
Start: 2022-01-27 | End: 2022-01-28

## 2022-01-27 RX ORDER — FAMOTIDINE 20 MG/1
20 TABLET, FILM COATED ORAL ONCE AS NEEDED
Status: DISCONTINUED | OUTPATIENT
Start: 2022-01-27 | End: 2022-01-28 | Stop reason: HOSPADM

## 2022-01-27 RX ORDER — EPHEDRINE SULFATE 50 MG/ML
10 INJECTION, SOLUTION INTRAVENOUS
Status: DISCONTINUED | OUTPATIENT
Start: 2022-01-27 | End: 2022-01-28 | Stop reason: HOSPADM

## 2022-01-27 RX ORDER — FENTANYL CITRATE 50 UG/ML
INJECTION, SOLUTION INTRAMUSCULAR; INTRAVENOUS
Status: COMPLETED
Start: 2022-01-27 | End: 2022-01-27

## 2022-01-27 RX ORDER — FENTANYL CIT 0.2 MG/100ML-ROPIV 0.2%-NACL 0.9% EPIDURAL INJ 2/0.2 MCG/ML-%
SOLUTION INJECTION
Status: COMPLETED
Start: 2022-01-27 | End: 2022-01-27

## 2022-01-27 RX ORDER — OXYTOCIN-SODIUM CHLORIDE 0.9% IV SOLN 30 UNIT/500ML 30-0.9/5 UT/ML-%
250 SOLUTION INTRAVENOUS CONTINUOUS
Status: ACTIVE | OUTPATIENT
Start: 2022-01-27 | End: 2022-01-27

## 2022-01-27 RX ORDER — NALOXONE HCL 0.4 MG/ML
0.1 VIAL (ML) INJECTION
Status: DISCONTINUED | OUTPATIENT
Start: 2022-01-27 | End: 2022-01-29 | Stop reason: HOSPADM

## 2022-01-27 RX ORDER — OXYTOCIN-SODIUM CHLORIDE 0.9% IV SOLN 30 UNIT/500ML 30-0.9/5 UT/ML-%
999 SOLUTION INTRAVENOUS ONCE
Status: COMPLETED | OUTPATIENT
Start: 2022-01-27 | End: 2022-01-27

## 2022-01-27 RX ORDER — MISOPROSTOL 100 UG/1
800 TABLET ORAL AS NEEDED
Status: DISCONTINUED | OUTPATIENT
Start: 2022-01-27 | End: 2022-01-28 | Stop reason: HOSPADM

## 2022-01-27 RX ORDER — LIDOCAINE HYDROCHLORIDE 20 MG/ML
INJECTION, SOLUTION EPIDURAL; INFILTRATION; INTRACAUDAL; PERINEURAL
Status: COMPLETED
Start: 2022-01-27 | End: 2022-01-27

## 2022-01-27 RX ORDER — METHYLERGONOVINE MALEATE 0.2 MG/ML
200 INJECTION INTRAVENOUS ONCE AS NEEDED
Status: DISCONTINUED | OUTPATIENT
Start: 2022-01-27 | End: 2022-01-28 | Stop reason: HOSPADM

## 2022-01-27 RX ORDER — LIDOCAINE HYDROCHLORIDE 20 MG/ML
INJECTION, SOLUTION EPIDURAL; INFILTRATION; INTRACAUDAL; PERINEURAL AS NEEDED
Status: DISCONTINUED | OUTPATIENT
Start: 2022-01-27 | End: 2022-01-27 | Stop reason: SURG

## 2022-01-27 RX ORDER — HYDROCODONE BITARTRATE AND ACETAMINOPHEN 5; 325 MG/1; MG/1
1 TABLET ORAL EVERY 4 HOURS PRN
Status: DISCONTINUED | OUTPATIENT
Start: 2022-01-27 | End: 2022-01-28 | Stop reason: HOSPADM

## 2022-01-27 RX ORDER — SODIUM CHLORIDE, SODIUM LACTATE, POTASSIUM CHLORIDE, CALCIUM CHLORIDE 600; 310; 30; 20 MG/100ML; MG/100ML; MG/100ML; MG/100ML
125 INJECTION, SOLUTION INTRAVENOUS CONTINUOUS
Status: DISCONTINUED | OUTPATIENT
Start: 2022-01-27 | End: 2022-01-28

## 2022-01-27 RX ORDER — OXYTOCIN-SODIUM CHLORIDE 0.9% IV SOLN 30 UNIT/500ML 30-0.9/5 UT/ML-%
125 SOLUTION INTRAVENOUS CONTINUOUS PRN
Status: DISCONTINUED | OUTPATIENT
Start: 2022-01-27 | End: 2022-01-28 | Stop reason: HOSPADM

## 2022-01-27 RX ORDER — MAGNESIUM HYDROXIDE 1200 MG/15ML
3000 LIQUID ORAL ONCE AS NEEDED
Status: DISCONTINUED | OUTPATIENT
Start: 2022-01-27 | End: 2022-01-28 | Stop reason: HOSPADM

## 2022-01-27 RX ORDER — IBUPROFEN 800 MG/1
800 TABLET ORAL ONCE AS NEEDED
Status: DISCONTINUED | OUTPATIENT
Start: 2022-01-27 | End: 2022-01-28 | Stop reason: HOSPADM

## 2022-01-27 RX ORDER — ACETAMINOPHEN 325 MG/1
650 TABLET ORAL EVERY 4 HOURS PRN
Status: DISCONTINUED | OUTPATIENT
Start: 2022-01-27 | End: 2022-01-28 | Stop reason: HOSPADM

## 2022-01-27 RX ORDER — MAGNESIUM HYDROXIDE 1200 MG/15ML
3000 LIQUID ORAL ONCE AS NEEDED
Status: COMPLETED | OUTPATIENT
Start: 2022-01-27 | End: 2022-01-27

## 2022-01-27 RX ORDER — FAMOTIDINE 10 MG/ML
20 INJECTION, SOLUTION INTRAVENOUS ONCE AS NEEDED
Status: DISCONTINUED | OUTPATIENT
Start: 2022-01-27 | End: 2022-01-28 | Stop reason: HOSPADM

## 2022-01-27 RX ORDER — ONDANSETRON 4 MG/1
4 TABLET, FILM COATED ORAL EVERY 6 HOURS PRN
Status: DISCONTINUED | OUTPATIENT
Start: 2022-01-27 | End: 2022-01-28 | Stop reason: HOSPADM

## 2022-01-27 RX ORDER — PRENATAL VIT/IRON FUM/FOLIC AC 27MG-0.8MG
1 TABLET ORAL DAILY
Status: DISCONTINUED | OUTPATIENT
Start: 2022-01-28 | End: 2022-01-28

## 2022-01-27 RX ORDER — LIDOCAINE HYDROCHLORIDE AND EPINEPHRINE 10; 10 MG/ML; UG/ML
INJECTION, SOLUTION INFILTRATION; PERINEURAL AS NEEDED
Status: DISCONTINUED | OUTPATIENT
Start: 2022-01-27 | End: 2022-01-27 | Stop reason: SURG

## 2022-01-27 RX ORDER — FENTANYL CITRATE 50 UG/ML
100 INJECTION, SOLUTION INTRAMUSCULAR; INTRAVENOUS ONCE
Status: COMPLETED | OUTPATIENT
Start: 2022-01-27 | End: 2022-01-27

## 2022-01-27 RX ORDER — DEXTROSE, SODIUM CHLORIDE, SODIUM LACTATE, POTASSIUM CHLORIDE, AND CALCIUM CHLORIDE 5; .6; .31; .03; .02 G/100ML; G/100ML; G/100ML; G/100ML; G/100ML
125 INJECTION, SOLUTION INTRAVENOUS CONTINUOUS
Status: DISCONTINUED | OUTPATIENT
Start: 2022-01-27 | End: 2022-01-28

## 2022-01-27 RX ADMIN — LIDOCAINE HYDROCHLORIDE,EPINEPHRINE BITARTRATE 3 ML: 10; .01 INJECTION, SOLUTION INFILTRATION; PERINEURAL at 10:23

## 2022-01-27 RX ADMIN — LIDOCAINE HYDROCHLORIDE 10 ML: 20 INJECTION, SOLUTION EPIDURAL; INFILTRATION; INTRACAUDAL; PERINEURAL at 20:33

## 2022-01-27 RX ADMIN — LIDOCAINE HYDROCHLORIDE 5 ML: 20 INJECTION, SOLUTION EPIDURAL; INFILTRATION; INTRACAUDAL; PERINEURAL at 21:04

## 2022-01-27 RX ADMIN — Medication 14 ML/HR: at 16:49

## 2022-01-27 RX ADMIN — SODIUM CHLORIDE, POTASSIUM CHLORIDE, SODIUM LACTATE AND CALCIUM CHLORIDE 125 ML/HR: 600; 310; 30; 20 INJECTION, SOLUTION INTRAVENOUS at 16:24

## 2022-01-27 RX ADMIN — LIDOCAINE HYDROCHLORIDE 10 ML: 20 INJECTION, SOLUTION EPIDURAL; INFILTRATION; INTRACAUDAL; PERINEURAL at 20:50

## 2022-01-27 RX ADMIN — PROPOFOL 50 MG: 10 INJECTION, EMULSION INTRAVENOUS at 21:05

## 2022-01-27 RX ADMIN — OXYTOCIN 2 MILLI-UNITS/MIN: 10 INJECTION, SOLUTION INTRAMUSCULAR; INTRAVENOUS at 08:00

## 2022-01-27 RX ADMIN — MISOPROSTOL 25 MCG: 100 TABLET ORAL at 03:04

## 2022-01-27 RX ADMIN — SODIUM CHLORIDE, POTASSIUM CHLORIDE, SODIUM LACTATE AND CALCIUM CHLORIDE 125 ML/HR: 600; 310; 30; 20 INJECTION, SOLUTION INTRAVENOUS at 14:14

## 2022-01-27 RX ADMIN — SODIUM CHLORIDE, SODIUM LACTATE, POTASSIUM CHLORIDE, CALCIUM CHLORIDE AND DEXTROSE MONOHYDRATE 999 ML/HR: 5; 600; 310; 30; 20 INJECTION, SOLUTION INTRAVENOUS at 15:25

## 2022-01-27 RX ADMIN — ACETAMINOPHEN 650 MG: 325 TABLET ORAL at 14:37

## 2022-01-27 RX ADMIN — Medication: at 22:04

## 2022-01-27 RX ADMIN — SODIUM CHLORIDE, POTASSIUM CHLORIDE, SODIUM LACTATE AND CALCIUM CHLORIDE 125 ML/HR: 600; 310; 30; 20 INJECTION, SOLUTION INTRAVENOUS at 04:45

## 2022-01-27 RX ADMIN — AZITHROMYCIN MONOHYDRATE 500 MG: 500 INJECTION, POWDER, LYOPHILIZED, FOR SOLUTION INTRAVENOUS at 20:23

## 2022-01-27 RX ADMIN — SODIUM CHLORIDE, POTASSIUM CHLORIDE, SODIUM LACTATE AND CALCIUM CHLORIDE 125 ML/HR: 600; 310; 30; 20 INJECTION, SOLUTION INTRAVENOUS at 09:23

## 2022-01-27 RX ADMIN — LIDOCAINE HYDROCHLORIDE 5 ML: 20 INJECTION, SOLUTION EPIDURAL; INFILTRATION; INTRACAUDAL; PERINEURAL at 10:28

## 2022-01-27 RX ADMIN — Medication 14 ML/HR: at 10:28

## 2022-01-27 RX ADMIN — SODIUM CHLORIDE 3000 ML: 900 IRRIGANT IRRIGATION at 14:38

## 2022-01-27 RX ADMIN — PROPOFOL 50 MG: 10 INJECTION, EMULSION INTRAVENOUS at 21:25

## 2022-01-27 RX ADMIN — Medication 1 APPLICATION: at 14:37

## 2022-01-27 RX ADMIN — FENTANYL CITRATE 100 MCG: 50 INJECTION, SOLUTION INTRAMUSCULAR; INTRAVENOUS at 10:28

## 2022-01-27 RX ADMIN — CEFAZOLIN SODIUM 2 G: 2 SOLUTION INTRAVENOUS at 20:59

## 2022-01-27 RX ADMIN — PROPOFOL 50 MG: 10 INJECTION, EMULSION INTRAVENOUS at 21:15

## 2022-01-27 RX ADMIN — LIDOCAINE HYDROCHLORIDE 5 ML: 20 INJECTION, SOLUTION EPIDURAL; INFILTRATION; INTRACAUDAL; PERINEURAL at 20:56

## 2022-01-27 RX ADMIN — OXYTOCIN 999 ML/HR: 10 INJECTION, SOLUTION INTRAMUSCULAR; INTRAVENOUS at 21:52

## 2022-01-27 RX ADMIN — SODIUM CHLORIDE, POTASSIUM CHLORIDE, SODIUM LACTATE AND CALCIUM CHLORIDE 999 ML/HR: 600; 310; 30; 20 INJECTION, SOLUTION INTRAVENOUS at 10:22

## 2022-01-28 PROBLEM — Z34.90 PREGNANCY: Status: RESOLVED | Noted: 2022-01-26 | Resolved: 2022-01-28

## 2022-01-28 LAB
ABO GROUP BLD: NORMAL
BASOPHILS # BLD AUTO: 0.03 10*3/MM3 (ref 0–0.2)
BASOPHILS NFR BLD AUTO: 0.2 % (ref 0–1.5)
DEPRECATED RDW RBC AUTO: 47.5 FL (ref 37–54)
EOSINOPHIL # BLD AUTO: 0.01 10*3/MM3 (ref 0–0.4)
EOSINOPHIL NFR BLD AUTO: 0.1 % (ref 0.3–6.2)
ERYTHROCYTE [DISTWIDTH] IN BLOOD BY AUTOMATED COUNT: 14.6 % (ref 12.3–15.4)
FETAL BLEED: NEGATIVE
HCT VFR BLD AUTO: 29.8 % (ref 34–46.6)
HGB BLD-MCNC: 9.4 G/DL (ref 12–15.9)
IMM GRANULOCYTES # BLD AUTO: 0.06 10*3/MM3 (ref 0–0.05)
IMM GRANULOCYTES NFR BLD AUTO: 0.4 % (ref 0–0.5)
LYMPHOCYTES # BLD AUTO: 1.72 10*3/MM3 (ref 0.7–3.1)
LYMPHOCYTES NFR BLD AUTO: 12.5 % (ref 19.6–45.3)
MCH RBC QN AUTO: 28.5 PG (ref 26.6–33)
MCHC RBC AUTO-ENTMCNC: 31.5 G/DL (ref 31.5–35.7)
MCV RBC AUTO: 90.3 FL (ref 79–97)
MONOCYTES # BLD AUTO: 1.15 10*3/MM3 (ref 0.1–0.9)
MONOCYTES NFR BLD AUTO: 8.4 % (ref 5–12)
NEUTROPHILS NFR BLD AUTO: 10.77 10*3/MM3 (ref 1.7–7)
NEUTROPHILS NFR BLD AUTO: 78.4 % (ref 42.7–76)
NRBC BLD AUTO-RTO: 0 /100 WBC (ref 0–0.2)
NUMBER OF DOSES: NORMAL
PLATELET # BLD AUTO: 196 10*3/MM3 (ref 140–450)
PMV BLD AUTO: 9.5 FL (ref 6–12)
RBC # BLD AUTO: 3.3 10*6/MM3 (ref 3.77–5.28)
RH BLD: NEGATIVE
WBC NRBC COR # BLD: 13.74 10*3/MM3 (ref 3.4–10.8)

## 2022-01-28 PROCEDURE — 25010000002 RHO D IMMUNE GLOBULIN 1500 UNITS SOLUTION PREFILLED SYRINGE: Performed by: OBSTETRICS & GYNECOLOGY

## 2022-01-28 PROCEDURE — 86900 BLOOD TYPING SEROLOGIC ABO: CPT | Performed by: OBSTETRICS & GYNECOLOGY

## 2022-01-28 PROCEDURE — 86901 BLOOD TYPING SEROLOGIC RH(D): CPT | Performed by: OBSTETRICS & GYNECOLOGY

## 2022-01-28 PROCEDURE — 85461 HEMOGLOBIN FETAL: CPT | Performed by: OBSTETRICS & GYNECOLOGY

## 2022-01-28 PROCEDURE — 85025 COMPLETE CBC W/AUTO DIFF WBC: CPT | Performed by: OBSTETRICS & GYNECOLOGY

## 2022-01-28 RX ORDER — MISOPROSTOL 100 UG/1
600 TABLET ORAL AS NEEDED
Status: DISCONTINUED | OUTPATIENT
Start: 2022-01-28 | End: 2022-01-29 | Stop reason: HOSPADM

## 2022-01-28 RX ORDER — IBUPROFEN 800 MG/1
800 TABLET ORAL EVERY 8 HOURS SCHEDULED
Status: DISCONTINUED | OUTPATIENT
Start: 2022-01-28 | End: 2022-01-29 | Stop reason: HOSPADM

## 2022-01-28 RX ORDER — PROMETHAZINE HYDROCHLORIDE 25 MG/1
12.5 TABLET ORAL EVERY 6 HOURS PRN
Status: DISCONTINUED | OUTPATIENT
Start: 2022-01-28 | End: 2022-01-29 | Stop reason: HOSPADM

## 2022-01-28 RX ORDER — OXYTOCIN-SODIUM CHLORIDE 0.9% IV SOLN 30 UNIT/500ML 30-0.9/5 UT/ML-%
250 SOLUTION INTRAVENOUS CONTINUOUS
Status: ACTIVE | OUTPATIENT
Start: 2022-01-28 | End: 2022-01-28

## 2022-01-28 RX ORDER — DIPHENHYDRAMINE HCL 25 MG
25 CAPSULE ORAL EVERY 4 HOURS PRN
Status: DISCONTINUED | OUTPATIENT
Start: 2022-01-28 | End: 2022-01-29 | Stop reason: HOSPADM

## 2022-01-28 RX ORDER — SODIUM CHLORIDE 0.9 % (FLUSH) 0.9 %
3-10 SYRINGE (ML) INJECTION AS NEEDED
Status: DISCONTINUED | OUTPATIENT
Start: 2022-01-28 | End: 2022-01-29 | Stop reason: HOSPADM

## 2022-01-28 RX ORDER — CARBOPROST TROMETHAMINE 250 UG/ML
250 INJECTION, SOLUTION INTRAMUSCULAR EVERY 4 HOURS PRN
Status: DISCONTINUED | OUTPATIENT
Start: 2022-01-28 | End: 2022-01-29 | Stop reason: HOSPADM

## 2022-01-28 RX ORDER — NALOXONE HCL 0.4 MG/ML
0.1 VIAL (ML) INJECTION
Status: ACTIVE | OUTPATIENT
Start: 2022-01-28 | End: 2022-01-29

## 2022-01-28 RX ORDER — ONDANSETRON 2 MG/ML
4 INJECTION INTRAMUSCULAR; INTRAVENOUS ONCE AS NEEDED
Status: DISCONTINUED | OUTPATIENT
Start: 2022-01-28 | End: 2022-01-29 | Stop reason: HOSPADM

## 2022-01-28 RX ORDER — PRENATAL VIT/IRON FUM/FOLIC AC 27MG-0.8MG
1 TABLET ORAL DAILY
Status: DISCONTINUED | OUTPATIENT
Start: 2022-01-28 | End: 2022-01-29 | Stop reason: HOSPADM

## 2022-01-28 RX ORDER — OXYCODONE AND ACETAMINOPHEN 10; 325 MG/1; MG/1
1 TABLET ORAL EVERY 4 HOURS PRN
Status: DISCONTINUED | OUTPATIENT
Start: 2022-01-28 | End: 2022-01-29 | Stop reason: HOSPADM

## 2022-01-28 RX ORDER — DOCUSATE SODIUM 100 MG/1
100 CAPSULE, LIQUID FILLED ORAL 2 TIMES DAILY PRN
Status: DISCONTINUED | OUTPATIENT
Start: 2022-01-28 | End: 2022-01-29 | Stop reason: HOSPADM

## 2022-01-28 RX ORDER — DIPHENHYDRAMINE HYDROCHLORIDE 50 MG/ML
25 INJECTION INTRAMUSCULAR; INTRAVENOUS EVERY 4 HOURS PRN
Status: DISCONTINUED | OUTPATIENT
Start: 2022-01-28 | End: 2022-01-29 | Stop reason: HOSPADM

## 2022-01-28 RX ORDER — PROMETHAZINE HYDROCHLORIDE 12.5 MG/1
12.5 SUPPOSITORY RECTAL EVERY 6 HOURS PRN
Status: DISCONTINUED | OUTPATIENT
Start: 2022-01-28 | End: 2022-01-29 | Stop reason: HOSPADM

## 2022-01-28 RX ORDER — ONDANSETRON 2 MG/ML
4 INJECTION INTRAMUSCULAR; INTRAVENOUS EVERY 6 HOURS PRN
Status: DISCONTINUED | OUTPATIENT
Start: 2022-01-28 | End: 2022-01-29 | Stop reason: HOSPADM

## 2022-01-28 RX ORDER — METHYLERGONOVINE MALEATE 0.2 MG/ML
200 INJECTION INTRAVENOUS ONCE AS NEEDED
Status: DISCONTINUED | OUTPATIENT
Start: 2022-01-28 | End: 2022-01-29 | Stop reason: HOSPADM

## 2022-01-28 RX ORDER — FERROUS SULFATE 325(65) MG
325 TABLET ORAL 2 TIMES DAILY WITH MEALS
Status: DISCONTINUED | OUTPATIENT
Start: 2022-01-28 | End: 2022-01-29 | Stop reason: HOSPADM

## 2022-01-28 RX ORDER — SIMETHICONE 80 MG
80 TABLET,CHEWABLE ORAL 4 TIMES DAILY PRN
Status: DISCONTINUED | OUTPATIENT
Start: 2022-01-28 | End: 2022-01-29 | Stop reason: HOSPADM

## 2022-01-28 RX ORDER — MORPHINE SULFATE 2 MG/ML
2 INJECTION, SOLUTION INTRAMUSCULAR; INTRAVENOUS
Status: DISCONTINUED | OUTPATIENT
Start: 2022-01-28 | End: 2022-01-29 | Stop reason: HOSPADM

## 2022-01-28 RX ORDER — HYDROXYZINE 50 MG/1
50 TABLET, FILM COATED ORAL EVERY 6 HOURS PRN
Status: DISCONTINUED | OUTPATIENT
Start: 2022-01-28 | End: 2022-01-29 | Stop reason: HOSPADM

## 2022-01-28 RX ORDER — KETOROLAC TROMETHAMINE 30 MG/ML
30 INJECTION, SOLUTION INTRAMUSCULAR; INTRAVENOUS EVERY 6 HOURS PRN
Status: DISCONTINUED | OUTPATIENT
Start: 2022-01-28 | End: 2022-01-29 | Stop reason: HOSPADM

## 2022-01-28 RX ORDER — SODIUM CHLORIDE, SODIUM LACTATE, POTASSIUM CHLORIDE, CALCIUM CHLORIDE 600; 310; 30; 20 MG/100ML; MG/100ML; MG/100ML; MG/100ML
125 INJECTION, SOLUTION INTRAVENOUS CONTINUOUS
Status: DISCONTINUED | OUTPATIENT
Start: 2022-01-28 | End: 2022-01-29 | Stop reason: HOSPADM

## 2022-01-28 RX ORDER — ONDANSETRON 4 MG/1
4 TABLET, FILM COATED ORAL EVERY 6 HOURS PRN
Status: DISCONTINUED | OUTPATIENT
Start: 2022-01-28 | End: 2022-01-29 | Stop reason: HOSPADM

## 2022-01-28 RX ORDER — OXYCODONE HYDROCHLORIDE AND ACETAMINOPHEN 5; 325 MG/1; MG/1
1 TABLET ORAL EVERY 4 HOURS PRN
Status: DISCONTINUED | OUTPATIENT
Start: 2022-01-28 | End: 2022-01-29 | Stop reason: HOSPADM

## 2022-01-28 RX ORDER — OXYTOCIN-SODIUM CHLORIDE 0.9% IV SOLN 30 UNIT/500ML 30-0.9/5 UT/ML-%
999 SOLUTION INTRAVENOUS ONCE
Status: DISCONTINUED | OUTPATIENT
Start: 2022-01-28 | End: 2022-01-29 | Stop reason: HOSPADM

## 2022-01-28 RX ORDER — OXYTOCIN-SODIUM CHLORIDE 0.9% IV SOLN 30 UNIT/500ML 30-0.9/5 UT/ML-%
125 SOLUTION INTRAVENOUS CONTINUOUS PRN
Status: DISCONTINUED | OUTPATIENT
Start: 2022-01-28 | End: 2022-01-29 | Stop reason: HOSPADM

## 2022-01-28 RX ORDER — METHYLERGONOVINE MALEATE 0.2 MG/ML
200 INJECTION INTRAVENOUS AS NEEDED
Status: DISCONTINUED | OUTPATIENT
Start: 2022-01-28 | End: 2022-01-29 | Stop reason: HOSPADM

## 2022-01-28 RX ORDER — CARBOPROST TROMETHAMINE 250 UG/ML
250 INJECTION, SOLUTION INTRAMUSCULAR AS NEEDED
Status: DISCONTINUED | OUTPATIENT
Start: 2022-01-28 | End: 2022-01-29 | Stop reason: HOSPADM

## 2022-01-28 RX ADMIN — IBUPROFEN 800 MG: 800 TABLET, FILM COATED ORAL at 05:48

## 2022-01-28 RX ADMIN — IBUPROFEN 800 MG: 800 TABLET, FILM COATED ORAL at 22:13

## 2022-01-28 RX ADMIN — OXYCODONE HYDROCHLORIDE AND ACETAMINOPHEN 1 TABLET: 10; 325 TABLET ORAL at 10:44

## 2022-01-28 RX ADMIN — FERROUS SULFATE TAB 325 MG (65 MG ELEMENTAL FE) 325 MG: 325 (65 FE) TAB at 18:22

## 2022-01-28 RX ADMIN — IBUPROFEN 800 MG: 800 TABLET, FILM COATED ORAL at 13:54

## 2022-01-28 RX ADMIN — PRENATAL VIT W/ FE FUMARATE-FA TAB 27-0.8 MG 1 TABLET: 27-0.8 TAB at 08:26

## 2022-01-28 RX ADMIN — SODIUM CHLORIDE, POTASSIUM CHLORIDE, SODIUM LACTATE AND CALCIUM CHLORIDE 125 ML/HR: 600; 310; 30; 20 INJECTION, SOLUTION INTRAVENOUS at 00:42

## 2022-01-28 RX ADMIN — OXYCODONE HYDROCHLORIDE AND ACETAMINOPHEN 1 TABLET: 10; 325 TABLET ORAL at 18:22

## 2022-01-28 RX ADMIN — HUMAN RHO(D) IMMUNE GLOBULIN 1500 UNITS: 300 INJECTION, SOLUTION INTRAMUSCULAR at 16:16

## 2022-01-28 RX ADMIN — FERROUS SULFATE TAB 325 MG (65 MG ELEMENTAL FE) 325 MG: 325 (65 FE) TAB at 09:49

## 2022-01-28 RX ADMIN — DOCUSATE SODIUM 100 MG: 100 CAPSULE ORAL at 13:54

## 2022-01-29 VITALS
HEIGHT: 67 IN | DIASTOLIC BLOOD PRESSURE: 72 MMHG | SYSTOLIC BLOOD PRESSURE: 128 MMHG | TEMPERATURE: 97.8 F | WEIGHT: 250 LBS | OXYGEN SATURATION: 98 % | RESPIRATION RATE: 18 BRPM | HEART RATE: 90 BPM | BODY MASS INDEX: 39.24 KG/M2

## 2022-01-29 RX ORDER — FERROUS SULFATE 325(65) MG
325 TABLET ORAL
Qty: 30 TABLET | Refills: 0 | Status: SHIPPED | OUTPATIENT
Start: 2022-01-29 | End: 2022-03-23

## 2022-01-29 RX ORDER — IBUPROFEN 800 MG/1
800 TABLET ORAL EVERY 8 HOURS PRN
Qty: 40 TABLET | Refills: 0 | Status: SHIPPED | OUTPATIENT
Start: 2022-01-29 | End: 2023-02-21 | Stop reason: ALTCHOICE

## 2022-01-29 RX ORDER — OXYCODONE HYDROCHLORIDE AND ACETAMINOPHEN 5; 325 MG/1; MG/1
1 TABLET ORAL EVERY 6 HOURS PRN
Qty: 25 TABLET | Refills: 0 | Status: SHIPPED | OUTPATIENT
Start: 2022-01-29 | End: 2022-02-05

## 2022-01-29 RX ORDER — PSEUDOEPHEDRINE HCL 30 MG
100 TABLET ORAL 2 TIMES DAILY PRN
Qty: 30 CAPSULE | Refills: 0 | Status: SHIPPED | OUTPATIENT
Start: 2022-01-29 | End: 2022-03-23

## 2022-01-29 RX ADMIN — PRENATAL VIT W/ FE FUMARATE-FA TAB 27-0.8 MG 1 TABLET: 27-0.8 TAB at 08:27

## 2022-01-29 RX ADMIN — OXYCODONE HYDROCHLORIDE AND ACETAMINOPHEN 1 TABLET: 5; 325 TABLET ORAL at 10:16

## 2022-01-29 RX ADMIN — IBUPROFEN 800 MG: 800 TABLET, FILM COATED ORAL at 13:32

## 2022-01-29 RX ADMIN — IBUPROFEN 800 MG: 800 TABLET, FILM COATED ORAL at 06:23

## 2022-01-29 RX ADMIN — FERROUS SULFATE TAB 325 MG (65 MG ELEMENTAL FE) 325 MG: 325 (65 FE) TAB at 08:27

## 2022-01-29 RX ADMIN — DOCUSATE SODIUM 100 MG: 100 CAPSULE ORAL at 08:27

## 2022-02-02 ENCOUNTER — TELEPHONE (OUTPATIENT)
Dept: OBSTETRICS AND GYNECOLOGY | Facility: HOSPITAL | Age: 22
End: 2022-02-02

## 2022-02-02 NOTE — TELEPHONE ENCOUNTER
Postpartum Maternal Virtual Visit Form  Liliam Crooks  [id number]  [unfilled]       Prenatal, Intrapartum, Postpartum Summary:       OB Provider: Dr. Mathew    Other Providers: JAVON    Other Services Used: [facility]       Prenatal Diagnoses: none    : 1     Para: 1      Gender: female       Medications: Cannot display prior to admission medications because the patient has not been admitted in this contact.         Ibuprofen, Percocet, PNV       Labor/Delivery Complications: Fetal intolerance    Gestation at birth: 39w0d    Postpartum Complications: None    Delivery Method:  Section    Pain Relief Options During Hospital Stay: IV pain medication and oral pain medication     Smoking Status: Never smoker    Substance Use: Denies substance use/abuse        Feeding Plan: breastfeeding     Primary Language: English     Other: none       Postpartum Depression Scale:       I am able to laugh and see the funny side of things: 0 = As much as I ever did    I can look forward to things with enjoyment: 0 = As much as I ever did    I blame myself unnecessarily when things went wron = Never    I find myself anxious of worried for no good reason: 0 = Never    I feel scared or panicky for no good reason: 0 = Never    Things have been getting on top of me: 0 = Never    I have been so unhappy that I have difficulty sleepin = Never    I feel (or have felt) sad or miserable: 0 = Never    I have been so unhappy that I have been cryin = Never    I have had thoughts of harming myself: 0 = Never    Postpartum Depression Scale Total Score: 0          Pain/Comfort/Sleep:     Pain Rating (Numeric Scale 1 - 10)  Current :  0    At Rest:  0    With Activity:  2    Acceptable Pain Level:  3    Pain Location: lower abdomen    Pain Description: aching and incisional    Pain Management Strategies (How do you treat your pain?): Medication, rest         Coping/Psychosocial:       Verbalized Emotional State:   happiness    Family/Support Network: significant other and family    Level of Involvement in Care: attentive, interactive and supportive       Safety:       Do you feel comfortable in your relationship with your baby?:  Yes    Have members of your household adjusted to your baby?: Yes    Is the baby's father supportive and/or involved with the baby?: Yes    How does your partner feel about the baby?: happy     Do you feel safe at home, school and work?: Yes    Are you in a relationship with someone who threatens you or hurts you?: No    Do you have the resources to keep yourself and your baby healthy and safe?: Yes       Review of Systems:       History obtained from the patient       Reproductive:       Lochia (per patient report):              Color:  brownish-red              Amount:  scant              Odor:  none    Breast (per patient report):              Left Breast:  tender and soft              Right Breast:   tender and soft              Left Nipple:  intact and tender              Right Nipple:  intact and tender              Breast Care:  supportive bra       Provider Notification:       Provider Name: JAVON    Reason for Notification: NA    Response: NA       Education Discussion:       Postpartum Depression Screening:  Education provided    Peripheral Blood Glucose:  NA    Doctor Appointments:   Pt reports she has appointments for both herself and her infant    Car Seat Safety:  NA    Immunization Schedule:  Per MD    Breast Feeding:  Education provided    Infant Safety:  Education provided    Family Planning:  Declined    Postpartum Care:  Education provided    S&S to report:  Education provided    Comments:  Spoke with patient over the phone. Patient states she and her infant are doing well at home. Pt states her incision appears to be healing well and that she has PP minimal pain and bleeding. Pt reports she continues to have edema in lower legs and feet, she denies HA, vision changes or epigastric  pain. Discussed with pt avoiding sodium and elevating lower extremities when possible. Pt states her infant is latching onto breast well and is having good voids and stools. Pt states she has all needed supplies and resources. She denies any needs or concerns at this time.       Follow-Up Appoinments:       Follow-up Appointments made:  Yes -  Appointment Date: 02/16/2022      Provider/Agency: [facility]    Well Child Visit Appointment made:  Yes -  Appointment Date: 02/9/2022      Provider/Agency: [facility]       Visit Summary:       Visit Summary:  Visit completed: No referral needed       Additional Notes:         Promise Teague RN,  02/02/22 - 11:31 AM EST

## 2022-02-04 RX ORDER — PROPOFOL 10 MG/ML
VIAL (ML) INTRAVENOUS AS NEEDED
Status: DISCONTINUED | OUTPATIENT
Start: 2022-01-27 | End: 2022-02-04 | Stop reason: SURG

## 2022-02-04 NOTE — ANESTHESIA POSTPROCEDURE EVALUATION
Patient: Liliam Crooks    Procedure Summary     Date: 22 Room / Location:  [facility]   [facility]    Anesthesia Start:  Anesthesia Stop:     Procedure:  SECTION PRIMARY (Bilateral Abdomen) Diagnosis: (Arrest of Descent)    Surgeons: Nicki Mathews DO Provider: Sandra Mandel CRNA    Anesthesia Type: epidural ASA Status: 3          Anesthesia Type: epidural    Vitals  Vitals Value Taken Time   /72 22 0720   Temp 97.8 °F (36.6 °C) 22 07   Pulse 90 22   Resp 18 22   SpO2 98 % 22           Post Anesthesia Care and Evaluation    Patient location during evaluation: PHASE II  Patient participation: complete - patient participated  Level of consciousness: awake and alert  Pain score: 1  Pain management: adequate  Airway patency: patent  Anesthetic complications: No anesthetic complications  PONV Status: controlled  Cardiovascular status: acceptable  Respiratory status: acceptable  Hydration status: acceptable    Comments: Retrospective update note:    Dosed epidural with 10cc 2% PF lidocaine about 12 minutes before we moved to OR. Pt had working epidural and could not  transfer from L&D bed to OR bed..  We used roller.   Another 10cc 2%l lidocaine once pt was on or table.  Pt evaluated with blunt tip needle and t4/5 level obtained.  Pt prepped and draped, pinch test negative.  Skin incision and abdominal incision were uneventful.  When the appliance was inserted, pt felt pressure and pain on the right side.   Dosed epidural with another 5cc 2% lidocaine and repeated about 10 minutes later.  After the baby was delivered pt was sedated with 50mg propofol x3 she was resting until the appliance was manipulated and removed.   She complained of pain.   Of note, mother, against policy brought two cell phones one face-timing family members and the other filming.   It was distractive and obtrusive into providing pt care.    I did attempt to explain not being  able to give mother sedation prior to delivery, but It was difficult to talk when she was face-timing family.

## 2022-02-23 ENCOUNTER — TRANSCRIBE ORDERS (OUTPATIENT)
Dept: ADMINISTRATIVE | Facility: HOSPITAL | Age: 22
End: 2022-02-23

## 2022-02-23 DIAGNOSIS — R10.11 RIGHT UPPER QUADRANT PAIN: Primary | ICD-10-CM

## 2022-02-25 ENCOUNTER — HOSPITAL ENCOUNTER (OUTPATIENT)
Dept: ULTRASOUND IMAGING | Facility: HOSPITAL | Age: 22
Discharge: HOME | End: 2022-02-25
Admitting: NURSE PRACTITIONER

## 2022-02-25 DIAGNOSIS — R10.11 RIGHT UPPER QUADRANT PAIN: ICD-10-CM

## 2022-02-25 PROCEDURE — 76705 ECHO EXAM OF ABDOMEN: CPT

## 2022-02-25 PROCEDURE — 76705 ECHO EXAM OF ABDOMEN: CPT | Performed by: RADIOLOGY

## 2022-03-10 ENCOUNTER — OFFICE VISIT (OUTPATIENT)
Dept: SURGERY | Facility: CLINIC | Age: 22
End: 2022-03-10

## 2022-03-10 VITALS
HEIGHT: 67 IN | WEIGHT: 232 LBS | SYSTOLIC BLOOD PRESSURE: 102 MMHG | HEART RATE: 65 BPM | BODY MASS INDEX: 36.41 KG/M2 | DIASTOLIC BLOOD PRESSURE: 77 MMHG

## 2022-03-10 DIAGNOSIS — K80.20 GALLSTONES: Primary | ICD-10-CM

## 2022-03-10 PROCEDURE — 99203 OFFICE O/P NEW LOW 30 MIN: CPT | Performed by: SURGERY

## 2022-03-10 RX ORDER — AMOXICILLIN AND CLAVULANATE POTASSIUM 875; 125 MG/1; MG/1
TABLET, FILM COATED ORAL
COMMUNITY
Start: 2022-03-01 | End: 2022-03-23

## 2022-03-10 RX ORDER — INDOCYANINE GREEN AND WATER 25 MG
2.5 KIT INJECTION ONCE
Status: CANCELLED | OUTPATIENT
Start: 2022-03-25 | End: 2022-03-10

## 2022-03-10 NOTE — H&P (VIEW-ONLY)
Subjective   Liliam Crooks is a 21 y.o. female is being seen for consultation today at the request of Reggie Mendoza MD    Liliam Crooks is a 21 y.o. female With right upper quadrant pain after fatty and greasy meals for several weeks.  She presented to the emergency department at an outside facility and was found to have gallstones without signs of cholecystitis.  She has mildly elevated transaminases.  Bilirubin normal.  No jaundice or scleral icterus.  No vomiting.    Abdominal Pain  Pertinent negatives include no constipation, diarrhea, dysuria, fever, headaches, nausea, rash, sore throat or vomiting.       Past Medical History:   Diagnosis Date   • History of bronchitis    • History of ear infections        Family History   Problem Relation Age of Onset   • No Known Problems Mother    • Hypertension Father    • Diabetes Maternal Aunt    • Diabetes Paternal Aunt    • Cancer Paternal Grandmother        Social History     Socioeconomic History   • Marital status: Single   Tobacco Use   • Smoking status: Former Smoker   • Smokeless tobacco: Never Used   Vaping Use   • Vaping Use: Every day   • Last attempt to quit: 2021   • Substances: Nicotine, Flavoring   • Devices: Disposable, Pre-filled or refillable cartridge   Substance and Sexual Activity   • Alcohol use: No   • Drug use: No       Past Surgical History:   Procedure Laterality Date   • ADENOIDECTOMY     •  SECTION Bilateral 2022    Procedure:  SECTION PRIMARY;  Surgeon: Nicki Mathews DO;  Location: [facility] LABOR DELIVERY;  Service: Obstetrics/Gynecology;  Laterality: Bilateral;   • EAR TUBES         Review of Systems   Constitutional: Negative for activity change, appetite change, chills and fever.   HENT: Negative for sore throat and trouble swallowing.    Eyes: Negative for visual disturbance.   Respiratory: Negative for cough and shortness of breath.    Cardiovascular: Negative for chest pain and palpitations.  "  Gastrointestinal: Positive for abdominal pain. Negative for abdominal distention, blood in stool, constipation, diarrhea, nausea and vomiting.   Endocrine: Negative for cold intolerance and heat intolerance.   Genitourinary: Negative for dysuria.   Musculoskeletal: Negative for joint swelling.   Skin: Negative for color change, rash and wound.   Allergic/Immunologic: Negative for immunocompromised state.   Neurological: Negative for dizziness, seizures, weakness and headaches.   Hematological: Negative for adenopathy. Does not bruise/bleed easily.   Psychiatric/Behavioral: Negative for agitation and confusion.         /77   Pulse 65   Ht 170.2 cm (67.01\")   Wt 105 kg (232 lb)   BMI 36.33 kg/m²   Objective   Physical Exam  Constitutional:       Appearance: She is well-developed.   HENT:      Head: Normocephalic and atraumatic.   Eyes:      Conjunctiva/sclera: Conjunctivae normal.      Pupils: Pupils are equal, round, and reactive to light.   Neck:      Thyroid: No thyromegaly.      Vascular: No JVD.      Trachea: No tracheal deviation.   Cardiovascular:      Rate and Rhythm: Normal rate and regular rhythm.      Heart sounds: No murmur heard.    No friction rub. No gallop.   Pulmonary:      Effort: Pulmonary effort is normal.      Breath sounds: Normal breath sounds.   Abdominal:      General: There is no distension.      Palpations: Abdomen is soft. There is no hepatomegaly or splenomegaly.      Tenderness: There is no abdominal tenderness.      Hernia: No hernia is present.   Musculoskeletal:         General: No deformity. Normal range of motion.      Cervical back: Neck supple.   Skin:     General: Skin is warm and dry.   Neurological:      Mental Status: She is alert and oriented to person, place, and time.               Assessment   Diagnoses and all orders for this visit:    1. Gallstones (Primary)  -     Case Request; Standing  -     COVID PRE-OP / PRE-PROCEDURE SCREENING ORDER (NO ISOLATION) - " Swab, Nasopharynx; Future  -     CBC & Differential; Future  -     Comprehensive Metabolic Panel; Future  -     Case Request    Other orders  -     Follow Anesthesia Guidelines / Standing Orders; Future  -     Provide NPO Instructions to Patient; Future  -     Chlorhexidine Skin Prep; Future      Liliam EDIE Crooks is a 21 y.o. female with symptomatic cholelithiasis.  Risks and benefits of been discussed with the patient and she will undergo robotic cholecystectomy 3/25/2022.    Patient's Body mass index is 36.33 kg/m². indicating that she is morbidly obese (BMI > 40 or > 35 with obesity - related health condition). Obesity-related health conditions include the following: GERD. Obesity is unchanged. BMI is is above average; BMI management plan is completed. We discussed portion control and increasing exercise..

## 2022-03-10 NOTE — PROGRESS NOTES
Subjective   Liliam Crooks is a 21 y.o. female is being seen for consultation today at the request of Reggie Mendoza MD    Liliam Crooks is a 21 y.o. female With right upper quadrant pain after fatty and greasy meals for several weeks.  She presented to the emergency department at an outside facility and was found to have gallstones without signs of cholecystitis.  She has mildly elevated transaminases.  Bilirubin normal.  No jaundice or scleral icterus.  No vomiting.    Abdominal Pain  Pertinent negatives include no constipation, diarrhea, dysuria, fever, headaches, nausea, rash, sore throat or vomiting.       Past Medical History:   Diagnosis Date   • History of bronchitis    • History of ear infections        Family History   Problem Relation Age of Onset   • No Known Problems Mother    • Hypertension Father    • Diabetes Maternal Aunt    • Diabetes Paternal Aunt    • Cancer Paternal Grandmother        Social History     Socioeconomic History   • Marital status: Single   Tobacco Use   • Smoking status: Former Smoker   • Smokeless tobacco: Never Used   Vaping Use   • Vaping Use: Every day   • Last attempt to quit: 2021   • Substances: Nicotine, Flavoring   • Devices: Disposable, Pre-filled or refillable cartridge   Substance and Sexual Activity   • Alcohol use: No   • Drug use: No       Past Surgical History:   Procedure Laterality Date   • ADENOIDECTOMY     •  SECTION Bilateral 2022    Procedure:  SECTION PRIMARY;  Surgeon: Nicki Mathews DO;  Location: [facility] LABOR DELIVERY;  Service: Obstetrics/Gynecology;  Laterality: Bilateral;   • EAR TUBES         Review of Systems   Constitutional: Negative for activity change, appetite change, chills and fever.   HENT: Negative for sore throat and trouble swallowing.    Eyes: Negative for visual disturbance.   Respiratory: Negative for cough and shortness of breath.    Cardiovascular: Negative for chest pain and palpitations.  "  Gastrointestinal: Positive for abdominal pain. Negative for abdominal distention, blood in stool, constipation, diarrhea, nausea and vomiting.   Endocrine: Negative for cold intolerance and heat intolerance.   Genitourinary: Negative for dysuria.   Musculoskeletal: Negative for joint swelling.   Skin: Negative for color change, rash and wound.   Allergic/Immunologic: Negative for immunocompromised state.   Neurological: Negative for dizziness, seizures, weakness and headaches.   Hematological: Negative for adenopathy. Does not bruise/bleed easily.   Psychiatric/Behavioral: Negative for agitation and confusion.         /77   Pulse 65   Ht 170.2 cm (67.01\")   Wt 105 kg (232 lb)   BMI 36.33 kg/m²   Objective   Physical Exam  Constitutional:       Appearance: She is well-developed.   HENT:      Head: Normocephalic and atraumatic.   Eyes:      Conjunctiva/sclera: Conjunctivae normal.      Pupils: Pupils are equal, round, and reactive to light.   Neck:      Thyroid: No thyromegaly.      Vascular: No JVD.      Trachea: No tracheal deviation.   Cardiovascular:      Rate and Rhythm: Normal rate and regular rhythm.      Heart sounds: No murmur heard.    No friction rub. No gallop.   Pulmonary:      Effort: Pulmonary effort is normal.      Breath sounds: Normal breath sounds.   Abdominal:      General: There is no distension.      Palpations: Abdomen is soft. There is no hepatomegaly or splenomegaly.      Tenderness: There is no abdominal tenderness.      Hernia: No hernia is present.   Musculoskeletal:         General: No deformity. Normal range of motion.      Cervical back: Neck supple.   Skin:     General: Skin is warm and dry.   Neurological:      Mental Status: She is alert and oriented to person, place, and time.               Assessment   Diagnoses and all orders for this visit:    1. Gallstones (Primary)  -     Case Request; Standing  -     COVID PRE-OP / PRE-PROCEDURE SCREENING ORDER (NO ISOLATION) - " Swab, Nasopharynx; Future  -     CBC & Differential; Future  -     Comprehensive Metabolic Panel; Future  -     Case Request    Other orders  -     Follow Anesthesia Guidelines / Standing Orders; Future  -     Provide NPO Instructions to Patient; Future  -     Chlorhexidine Skin Prep; Future      Liliam EDIE Crooks is a 21 y.o. female with symptomatic cholelithiasis.  Risks and benefits of been discussed with the patient and she will undergo robotic cholecystectomy 3/25/2022.    Patient's Body mass index is 36.33 kg/m². indicating that she is morbidly obese (BMI > 40 or > 35 with obesity - related health condition). Obesity-related health conditions include the following: GERD. Obesity is unchanged. BMI is is above average; BMI management plan is completed. We discussed portion control and increasing exercise..

## 2022-03-18 ENCOUNTER — TELEPHONE (OUTPATIENT)
Dept: SURGERY | Facility: CLINIC | Age: 22
End: 2022-03-18

## 2022-03-18 NOTE — TELEPHONE ENCOUNTER
Talked with patients mother, gave surgery time, preop and covid testing ,patients mother voiced understanding.

## 2022-03-23 ENCOUNTER — LAB (OUTPATIENT)
Dept: LAB | Facility: HOSPITAL | Age: 22
End: 2022-03-23

## 2022-03-23 ENCOUNTER — PRE-ADMISSION TESTING (OUTPATIENT)
Dept: PREADMISSION TESTING | Facility: HOSPITAL | Age: 22
End: 2022-03-23

## 2022-03-23 DIAGNOSIS — K80.20 GALLSTONES: ICD-10-CM

## 2022-03-23 LAB
ALBUMIN SERPL-MCNC: 4.27 G/DL (ref 3.5–5.2)
ALBUMIN/GLOB SERPL: 1.2 G/DL
ALP SERPL-CCNC: 206 U/L (ref 39–117)
ALT SERPL W P-5'-P-CCNC: 155 U/L (ref 1–33)
ANION GAP SERPL CALCULATED.3IONS-SCNC: 12.3 MMOL/L (ref 5–15)
AST SERPL-CCNC: 48 U/L (ref 1–32)
BASOPHILS # BLD AUTO: 0.02 10*3/MM3 (ref 0–0.2)
BASOPHILS NFR BLD AUTO: 0.3 % (ref 0–1.5)
BILIRUB SERPL-MCNC: 0.4 MG/DL (ref 0–1.2)
BUN SERPL-MCNC: 12 MG/DL (ref 6–20)
BUN/CREAT SERPL: 12.5 (ref 7–25)
CALCIUM SPEC-SCNC: 9.2 MG/DL (ref 8.6–10.5)
CHLORIDE SERPL-SCNC: 104 MMOL/L (ref 98–107)
CO2 SERPL-SCNC: 21.7 MMOL/L (ref 22–29)
CREAT SERPL-MCNC: 0.96 MG/DL (ref 0.57–1)
DEPRECATED RDW RBC AUTO: 42.5 FL (ref 37–54)
EGFRCR SERPLBLD CKD-EPI 2021: 86.5 ML/MIN/1.73
EOSINOPHIL # BLD AUTO: 0.1 10*3/MM3 (ref 0–0.4)
EOSINOPHIL NFR BLD AUTO: 1.5 % (ref 0.3–6.2)
ERYTHROCYTE [DISTWIDTH] IN BLOOD BY AUTOMATED COUNT: 13.9 % (ref 12.3–15.4)
GLOBULIN UR ELPH-MCNC: 3.4 GM/DL
GLUCOSE SERPL-MCNC: 88 MG/DL (ref 65–99)
HCT VFR BLD AUTO: 40.4 % (ref 34–46.6)
HGB BLD-MCNC: 13.3 G/DL (ref 12–15.9)
IMM GRANULOCYTES # BLD AUTO: 0.01 10*3/MM3 (ref 0–0.05)
IMM GRANULOCYTES NFR BLD AUTO: 0.2 % (ref 0–0.5)
LYMPHOCYTES # BLD AUTO: 1.56 10*3/MM3 (ref 0.7–3.1)
LYMPHOCYTES NFR BLD AUTO: 23.8 % (ref 19.6–45.3)
MCH RBC QN AUTO: 27.7 PG (ref 26.6–33)
MCHC RBC AUTO-ENTMCNC: 32.9 G/DL (ref 31.5–35.7)
MCV RBC AUTO: 84 FL (ref 79–97)
MONOCYTES # BLD AUTO: 0.58 10*3/MM3 (ref 0.1–0.9)
MONOCYTES NFR BLD AUTO: 8.8 % (ref 5–12)
NEUTROPHILS NFR BLD AUTO: 4.29 10*3/MM3 (ref 1.7–7)
NEUTROPHILS NFR BLD AUTO: 65.4 % (ref 42.7–76)
NRBC BLD AUTO-RTO: 0 /100 WBC (ref 0–0.2)
PLATELET # BLD AUTO: 302 10*3/MM3 (ref 140–450)
PMV BLD AUTO: 9 FL (ref 6–12)
POTASSIUM SERPL-SCNC: 4.1 MMOL/L (ref 3.5–5.2)
PROT SERPL-MCNC: 7.7 G/DL (ref 6–8.5)
RBC # BLD AUTO: 4.81 10*6/MM3 (ref 3.77–5.28)
SODIUM SERPL-SCNC: 138 MMOL/L (ref 136–145)
WBC NRBC COR # BLD: 6.56 10*3/MM3 (ref 3.4–10.8)

## 2022-03-23 PROCEDURE — 80053 COMPREHEN METABOLIC PANEL: CPT

## 2022-03-23 PROCEDURE — 85025 COMPLETE CBC W/AUTO DIFF WBC: CPT

## 2022-03-23 PROCEDURE — C9803 HOPD COVID-19 SPEC COLLECT: HCPCS

## 2022-03-23 PROCEDURE — U0004 COV-19 TEST NON-CDC HGH THRU: HCPCS | Performed by: SURGERY

## 2022-03-23 PROCEDURE — 36415 COLL VENOUS BLD VENIPUNCTURE: CPT

## 2022-03-23 NOTE — DISCHARGE INSTRUCTIONS

## 2022-03-24 LAB — SARS-COV-2 RNA PNL SPEC NAA+PROBE: NOT DETECTED

## 2022-03-25 ENCOUNTER — ANESTHESIA EVENT (OUTPATIENT)
Dept: PERIOP | Facility: HOSPITAL | Age: 22
End: 2022-03-25

## 2022-03-25 ENCOUNTER — ANESTHESIA (OUTPATIENT)
Dept: PERIOP | Facility: HOSPITAL | Age: 22
End: 2022-03-25

## 2022-03-25 ENCOUNTER — HOSPITAL ENCOUNTER (OUTPATIENT)
Facility: HOSPITAL | Age: 22
Setting detail: HOSPITAL OUTPATIENT SURGERY
Discharge: HOME | End: 2022-03-25
Attending: SURGERY | Admitting: SURGERY

## 2022-03-25 VITALS
RESPIRATION RATE: 14 BRPM | HEART RATE: 58 BPM | TEMPERATURE: 97.8 F | HEIGHT: 67 IN | DIASTOLIC BLOOD PRESSURE: 52 MMHG | OXYGEN SATURATION: 100 % | BODY MASS INDEX: 35.5 KG/M2 | WEIGHT: 226.2 LBS | SYSTOLIC BLOOD PRESSURE: 118 MMHG

## 2022-03-25 DIAGNOSIS — K80.20 GALLSTONES: ICD-10-CM

## 2022-03-25 LAB
B-HCG UR QL: NEGATIVE
EXPIRATION DATE: NORMAL
INTERNAL NEGATIVE CONTROL: NEGATIVE
INTERNAL POSITIVE CONTROL: POSITIVE
Lab: NORMAL

## 2022-03-25 PROCEDURE — 25010000002 NEOSTIGMINE 10 MG/10ML SOLUTION: Performed by: NURSE ANESTHETIST, CERTIFIED REGISTERED

## 2022-03-25 PROCEDURE — 25010000002 ONDANSETRON PER 1 MG: Performed by: NURSE ANESTHETIST, CERTIFIED REGISTERED

## 2022-03-25 PROCEDURE — 47562 LAPAROSCOPIC CHOLECYSTECTOMY: CPT | Performed by: SURGERY

## 2022-03-25 PROCEDURE — 81025 URINE PREGNANCY TEST: CPT | Performed by: ANESTHESIOLOGY

## 2022-03-25 PROCEDURE — 0 BUPIVACAINE LIPOSOME 1.3 % SUSPENSION: Performed by: NURSE ANESTHETIST, CERTIFIED REGISTERED

## 2022-03-25 PROCEDURE — 25010000002 AMPICILLIN-SULBACTAM PER 1.5 G: Performed by: SURGERY

## 2022-03-25 PROCEDURE — 25010000002 MIDAZOLAM PER 1 MG: Performed by: NURSE ANESTHETIST, CERTIFIED REGISTERED

## 2022-03-25 PROCEDURE — C1889 IMPLANT/INSERT DEVICE, NOC: HCPCS | Performed by: SURGERY

## 2022-03-25 PROCEDURE — 25010000002 PROPOFOL 10 MG/ML EMULSION: Performed by: NURSE ANESTHETIST, CERTIFIED REGISTERED

## 2022-03-25 PROCEDURE — 25010000002 FENTANYL CITRATE (PF) 50 MCG/ML SOLUTION: Performed by: NURSE ANESTHETIST, CERTIFIED REGISTERED

## 2022-03-25 PROCEDURE — C9290 INJ, BUPIVACAINE LIPOSOME: HCPCS | Performed by: NURSE ANESTHETIST, CERTIFIED REGISTERED

## 2022-03-25 DEVICE — HEMOLOK L 6 CLIPS/CART
Type: IMPLANTABLE DEVICE | Site: BILE DUCT | Status: FUNCTIONAL
Brand: WECK

## 2022-03-25 RX ORDER — SODIUM CHLORIDE 0.9 % (FLUSH) 0.9 %
10 SYRINGE (ML) INJECTION AS NEEDED
Status: DISCONTINUED | OUTPATIENT
Start: 2022-03-25 | End: 2022-03-25 | Stop reason: HOSPADM

## 2022-03-25 RX ORDER — DROPERIDOL 2.5 MG/ML
0.62 INJECTION, SOLUTION INTRAMUSCULAR; INTRAVENOUS ONCE AS NEEDED
Status: DISCONTINUED | OUTPATIENT
Start: 2022-03-25 | End: 2022-03-25 | Stop reason: HOSPADM

## 2022-03-25 RX ORDER — SODIUM CHLORIDE 0.9 % (FLUSH) 0.9 %
10 SYRINGE (ML) INJECTION EVERY 12 HOURS SCHEDULED
Status: DISCONTINUED | OUTPATIENT
Start: 2022-03-25 | End: 2022-03-25 | Stop reason: HOSPADM

## 2022-03-25 RX ORDER — ACETAMINOPHEN 325 MG/1
650 TABLET ORAL EVERY 4 HOURS PRN
Qty: 30 TABLET | Refills: 0 | Status: SHIPPED | OUTPATIENT
Start: 2022-03-25

## 2022-03-25 RX ORDER — OXYCODONE HYDROCHLORIDE AND ACETAMINOPHEN 5; 325 MG/1; MG/1
1 TABLET ORAL ONCE AS NEEDED
Status: DISCONTINUED | OUTPATIENT
Start: 2022-03-25 | End: 2022-03-25 | Stop reason: HOSPADM

## 2022-03-25 RX ORDER — FAMOTIDINE 10 MG/ML
INJECTION, SOLUTION INTRAVENOUS AS NEEDED
Status: DISCONTINUED | OUTPATIENT
Start: 2022-03-25 | End: 2022-03-25 | Stop reason: SURG

## 2022-03-25 RX ORDER — INDOCYANINE GREEN AND WATER 25 MG
2.5 KIT INJECTION ONCE
Status: COMPLETED | OUTPATIENT
Start: 2022-03-25 | End: 2022-03-25

## 2022-03-25 RX ORDER — SODIUM CHLORIDE, SODIUM LACTATE, POTASSIUM CHLORIDE, CALCIUM CHLORIDE 600; 310; 30; 20 MG/100ML; MG/100ML; MG/100ML; MG/100ML
INJECTION, SOLUTION INTRAVENOUS CONTINUOUS PRN
Status: DISCONTINUED | OUTPATIENT
Start: 2022-03-25 | End: 2022-03-25 | Stop reason: SURG

## 2022-03-25 RX ORDER — SODIUM CHLORIDE, SODIUM LACTATE, POTASSIUM CHLORIDE, CALCIUM CHLORIDE 600; 310; 30; 20 MG/100ML; MG/100ML; MG/100ML; MG/100ML
125 INJECTION, SOLUTION INTRAVENOUS ONCE
Status: COMPLETED | OUTPATIENT
Start: 2022-03-25 | End: 2022-03-25

## 2022-03-25 RX ORDER — MEPERIDINE HYDROCHLORIDE 25 MG/ML
12.5 INJECTION INTRAMUSCULAR; INTRAVENOUS; SUBCUTANEOUS
Status: DISCONTINUED | OUTPATIENT
Start: 2022-03-25 | End: 2022-03-25 | Stop reason: HOSPADM

## 2022-03-25 RX ORDER — TRAMADOL HYDROCHLORIDE 50 MG/1
50 TABLET ORAL EVERY 8 HOURS PRN
Qty: 8 TABLET | Refills: 0 | Status: SHIPPED | OUTPATIENT
Start: 2022-03-25 | End: 2023-02-21 | Stop reason: ALTCHOICE

## 2022-03-25 RX ORDER — PROPOFOL 10 MG/ML
VIAL (ML) INTRAVENOUS AS NEEDED
Status: DISCONTINUED | OUTPATIENT
Start: 2022-03-25 | End: 2022-03-25 | Stop reason: SURG

## 2022-03-25 RX ORDER — FENTANYL CITRATE 50 UG/ML
INJECTION, SOLUTION INTRAMUSCULAR; INTRAVENOUS AS NEEDED
Status: DISCONTINUED | OUTPATIENT
Start: 2022-03-25 | End: 2022-03-25 | Stop reason: SURG

## 2022-03-25 RX ORDER — KETOROLAC TROMETHAMINE 30 MG/ML
30 INJECTION, SOLUTION INTRAMUSCULAR; INTRAVENOUS EVERY 6 HOURS PRN
Status: DISCONTINUED | OUTPATIENT
Start: 2022-03-25 | End: 2022-03-25 | Stop reason: HOSPADM

## 2022-03-25 RX ORDER — IBUPROFEN 600 MG/1
600 TABLET ORAL EVERY 6 HOURS PRN
Qty: 30 TABLET | Refills: 0 | Status: SHIPPED | OUTPATIENT
Start: 2022-03-25

## 2022-03-25 RX ORDER — ONDANSETRON 2 MG/ML
INJECTION INTRAMUSCULAR; INTRAVENOUS AS NEEDED
Status: DISCONTINUED | OUTPATIENT
Start: 2022-03-25 | End: 2022-03-25 | Stop reason: SURG

## 2022-03-25 RX ORDER — OXYCODONE AND ACETAMINOPHEN 10; 325 MG/1; MG/1
1 TABLET ORAL EVERY 6 HOURS PRN
COMMUNITY
End: 2023-02-21 | Stop reason: ALTCHOICE

## 2022-03-25 RX ORDER — IPRATROPIUM BROMIDE AND ALBUTEROL SULFATE 2.5; .5 MG/3ML; MG/3ML
3 SOLUTION RESPIRATORY (INHALATION) ONCE AS NEEDED
Status: DISCONTINUED | OUTPATIENT
Start: 2022-03-25 | End: 2022-03-25 | Stop reason: HOSPADM

## 2022-03-25 RX ORDER — BUPIVACAINE HYDROCHLORIDE 5 MG/ML
INJECTION, SOLUTION EPIDURAL; INTRACAUDAL
Status: DISCONTINUED | OUTPATIENT
Start: 2022-03-25 | End: 2022-03-25 | Stop reason: SURG

## 2022-03-25 RX ORDER — ONDANSETRON 2 MG/ML
4 INJECTION INTRAMUSCULAR; INTRAVENOUS AS NEEDED
Status: DISCONTINUED | OUTPATIENT
Start: 2022-03-25 | End: 2022-03-25 | Stop reason: HOSPADM

## 2022-03-25 RX ORDER — SODIUM CHLORIDE, SODIUM LACTATE, POTASSIUM CHLORIDE, CALCIUM CHLORIDE 600; 310; 30; 20 MG/100ML; MG/100ML; MG/100ML; MG/100ML
100 INJECTION, SOLUTION INTRAVENOUS ONCE AS NEEDED
Status: DISCONTINUED | OUTPATIENT
Start: 2022-03-25 | End: 2022-03-25 | Stop reason: HOSPADM

## 2022-03-25 RX ORDER — MAGNESIUM HYDROXIDE 1200 MG/15ML
LIQUID ORAL AS NEEDED
Status: DISCONTINUED | OUTPATIENT
Start: 2022-03-25 | End: 2022-03-25 | Stop reason: HOSPADM

## 2022-03-25 RX ORDER — ROCURONIUM BROMIDE 10 MG/ML
INJECTION, SOLUTION INTRAVENOUS AS NEEDED
Status: DISCONTINUED | OUTPATIENT
Start: 2022-03-25 | End: 2022-03-25 | Stop reason: SURG

## 2022-03-25 RX ORDER — MIDAZOLAM HYDROCHLORIDE 1 MG/ML
1 INJECTION INTRAMUSCULAR; INTRAVENOUS
Status: DISCONTINUED | OUTPATIENT
Start: 2022-03-25 | End: 2022-03-25 | Stop reason: HOSPADM

## 2022-03-25 RX ORDER — FENTANYL CITRATE 50 UG/ML
50 INJECTION, SOLUTION INTRAMUSCULAR; INTRAVENOUS
Status: DISCONTINUED | OUTPATIENT
Start: 2022-03-25 | End: 2022-03-25 | Stop reason: HOSPADM

## 2022-03-25 RX ORDER — GLYCOPYRROLATE 0.2 MG/ML
INJECTION INTRAMUSCULAR; INTRAVENOUS AS NEEDED
Status: DISCONTINUED | OUTPATIENT
Start: 2022-03-25 | End: 2022-03-25 | Stop reason: SURG

## 2022-03-25 RX ORDER — NEOSTIGMINE METHYLSULFATE 1 MG/ML
INJECTION, SOLUTION INTRAVENOUS AS NEEDED
Status: DISCONTINUED | OUTPATIENT
Start: 2022-03-25 | End: 2022-03-25 | Stop reason: SURG

## 2022-03-25 RX ORDER — MIDAZOLAM HYDROCHLORIDE 1 MG/ML
INJECTION INTRAMUSCULAR; INTRAVENOUS AS NEEDED
Status: DISCONTINUED | OUTPATIENT
Start: 2022-03-25 | End: 2022-03-25 | Stop reason: SURG

## 2022-03-25 RX ADMIN — SODIUM CHLORIDE, POTASSIUM CHLORIDE, SODIUM LACTATE AND CALCIUM CHLORIDE: 600; 310; 30; 20 INJECTION, SOLUTION INTRAVENOUS at 08:47

## 2022-03-25 RX ADMIN — BUPIVACAINE 20 ML: 13.3 INJECTION, SUSPENSION, LIPOSOMAL INFILTRATION at 09:18

## 2022-03-25 RX ADMIN — SODIUM CHLORIDE, POTASSIUM CHLORIDE, SODIUM LACTATE AND CALCIUM CHLORIDE 125 ML/HR: 600; 310; 30; 20 INJECTION, SOLUTION INTRAVENOUS at 08:46

## 2022-03-25 RX ADMIN — INDOCYANINE GREEN AND WATER 2.5 MG: KIT at 08:47

## 2022-03-25 RX ADMIN — BUPIVACAINE HYDROCHLORIDE 30 ML: 5 INJECTION, SOLUTION EPIDURAL; INTRACAUDAL; PERINEURAL at 09:18

## 2022-03-25 RX ADMIN — FAMOTIDINE 20 MG: 10 INJECTION INTRAVENOUS at 09:10

## 2022-03-25 RX ADMIN — FENTANYL CITRATE 50 MCG: 50 INJECTION INTRAMUSCULAR; INTRAVENOUS at 10:02

## 2022-03-25 RX ADMIN — GLYCOPYRROLATE 0.4 MG: 0.2 INJECTION, SOLUTION INTRAMUSCULAR; INTRAVENOUS at 09:50

## 2022-03-25 RX ADMIN — ROCURONIUM BROMIDE 30 MG: 10 SOLUTION INTRAVENOUS at 09:13

## 2022-03-25 RX ADMIN — FENTANYL CITRATE 100 MCG: 50 INJECTION INTRAMUSCULAR; INTRAVENOUS at 09:08

## 2022-03-25 RX ADMIN — MIDAZOLAM 2 MG: 1 INJECTION INTRAMUSCULAR; INTRAVENOUS at 09:08

## 2022-03-25 RX ADMIN — FENTANYL CITRATE 50 MCG: 50 INJECTION INTRAMUSCULAR; INTRAVENOUS at 10:23

## 2022-03-25 RX ADMIN — PROPOFOL 200 MG: 10 INJECTION, EMULSION INTRAVENOUS at 09:13

## 2022-03-25 RX ADMIN — NEOSTIGMINE 3 MG: 1 INJECTION INTRAVENOUS at 09:50

## 2022-03-25 RX ADMIN — AMPICILLIN SODIUM AND SULBACTAM SODIUM 3 G: 2; 1 INJECTION, POWDER, FOR SOLUTION INTRAMUSCULAR; INTRAVENOUS at 09:09

## 2022-03-25 RX ADMIN — ONDANSETRON 4 MG: 2 INJECTION INTRAMUSCULAR; INTRAVENOUS at 09:10

## 2022-03-25 RX ADMIN — FENTANYL CITRATE 50 MCG: 50 INJECTION INTRAMUSCULAR; INTRAVENOUS at 10:00

## 2022-03-25 NOTE — ANESTHESIA PREPROCEDURE EVALUATION
Anesthesia Evaluation     Patient summary reviewed and Nursing notes reviewed   no history of anesthetic complications:  NPO Solid Status: > 8 hours  NPO Liquid Status: > 8 hours           Airway   Mallampati: II  TM distance: >3 FB  Neck ROM: full  No difficulty expected  Dental - normal exam   (+) poor dentition    Pulmonary - negative pulmonary ROS and normal exam    breath sounds clear to auscultation  (-) asthma, not a smoker  Cardiovascular - negative cardio ROS and normal exam    ECG reviewed  Rhythm: regular  Rate: normal    (-) past MI      Neuro/Psych  (+) weakness,    (-) seizures, CVA  GI/Hepatic/Renal/Endo    (+) obesity,       Musculoskeletal (-) negative ROS    Abdominal  - normal exam   Substance History   (+) alcohol use (Social),      OB/GYN negative ob/gyn ROS         Other - negative ROS                       Anesthesia Plan    ASA 2     general with block     intravenous induction     Anesthetic plan, all risks, benefits, and alternatives have been provided, discussed and informed consent has been obtained with: patient.  Use of blood products discussed with patient  Consented to blood products.   Plan discussed with CRNA.        CODE STATUS:

## 2022-03-25 NOTE — ANESTHESIA PROCEDURE NOTES
Airway  Urgency: elective    Date/Time: 3/25/2022 9:14 AM  End Time:3/25/2022 9:14 AM  Airway not difficult    General Information and Staff    Patient location during procedure: OR  CRNA: Alonso Watson CRNA    Indications and Patient Condition  Indications for airway management: airway protection    Preoxygenated: yes  MILS maintained throughout  Mask difficulty assessment: 0 - not attempted    Final Airway Details  Final airway type: endotracheal airway      Successful airway: ETT  Cuffed: yes   Successful intubation technique: direct laryngoscopy  Endotracheal tube insertion site: oral  Blade: Jose  Blade size: 3  ETT size (mm): 7.0  Cormack-Lehane Classification: grade IIa - partial view of glottis  Placement verified by: chest auscultation, capnometry and palpation of cuff   Cuff volume (mL): 8  Measured from: lips  ETT/EBT  to lips (cm): 22  Number of attempts at approach: 1  Assessment: lips, teeth, and gum same as pre-op and atraumatic intubation

## 2022-03-25 NOTE — ANESTHESIA POSTPROCEDURE EVALUATION
Patient: Liliam Crooks    Procedure Summary     Date: 03/25/22 Room / Location:  [facility] OR 04 /  [facility]    Anesthesia Start: 0908 Anesthesia Stop: 1001    Procedure: CHOLECYSTECTOMY LAPAROSCOPIC WITH DAVINCI ROBOT (N/A Abdomen) Diagnosis:       Gallstones      (Gallstones [K80.20])    Surgeons: Cristino Welch MD Provider: Eddie Mckinley MD    Anesthesia Type: general with block ASA Status: 2          Anesthesia Type: general with block    Vitals  Vitals Value Taken Time   /72 03/25/22 1035   Temp 97 °F (36.1 °C) 03/25/22 1005   Pulse 54 03/25/22 1035   Resp 14 03/25/22 1035   SpO2 100 % 03/25/22 1035           Post Anesthesia Care and Evaluation    Patient location during evaluation: PHASE II  Patient participation: complete - patient participated  Level of consciousness: awake and alert  Pain score: 0  Pain management: adequate  Airway patency: patent  Anesthetic complications: No anesthetic complications    Cardiovascular status: acceptable  Respiratory status: acceptable  Hydration status: acceptable

## 2022-03-25 NOTE — OP NOTE
CHOLECYSTECTOMY LAPAROSCOPIC WITH DAVINCI ROBOT  Procedure Note    Liliam Crooks  3/25/2022    Pre-op Diagnosis:   Gallstones [K80.20]    Post-op Diagnosis:     Post-Op Diagnosis Codes:     * Gallstones [K80.20]    Procedure(s):  CHOLECYSTECTOMY LAPAROSCOPIC WITH DAVINCI ROBOT    Surgeon(s):  Cristino Welch MD    Anesthesia: General    Staff:   Circulator: Osman Martino RN  Scrub Person: Naya Bernardo  Assistant: Comfort Narayan    Findings: gallstones, critical view of safety obtained    Operative Procedure: The patient was taken to the operating suite and placed supine on operating table.  Bilateral sequential compression devices were applied and general endotracheal anesthesia administered.  The abdomen was prepped and draped in usual sterile fashion.  Preoperative antibiotics were confirmed.  Timeout procedure was performed.  A Veress needle was inserted at Barr's point and saline drop test confirmed entry into the peritoneal space.  Pneumoperitoneum was established.  An 8 mm Optiview trocar was inserted through an infraumbilical incision.  The laparoscope was inserted and the Veress needle site was free of injury.  The Veress needle site was removed and upsized to an 8 mm robotic trocar.  A second 8 mm robotic trocar was placed in the anterior axillary line at the level of the umbilicus of the right abdomen.  A 5 mm Optiview trocar was then placed as far lateral as possible in the right abdomen for an assistant trocar.  At this time the robot was docked to the trocars and the robotic camera inserted.  The patient had been placed in reverse Trendelenburg with left lateral tilt prior to docking.  I then exited the sterile field and entered the robotic console.  My assistant placed a robotic hook in the right arm #1 and a fenestrated bipolar in the left arm #2.  My assistant grasped the fundus of the gallbladder and retracted anteriorly and superiorly. There were gallstones present.  Mild  adhesions to the fundus and infundibulum were taken down with cautery hook.  The infundibulum was exposed and grasped and retracted laterally and inferiorly.  The peritoneum on the anterior posterior surface of the gallbladder was opened with the cautery hook.  The gallbladder was elevated from the gallbladder fossa for a portion and the cystic duct and artery were identified and dissected free circumferentially.  All adventitial tissue between the cystic duct and artery was dissected free with the cautery hook.  The cystic plate was visible from the anterior and posterior views with only the cystic duct and artery seen entering the gallbladder.  With the critical view of safety obtained Firefly confirmed no abnormal ductal abnormality and at this time the cystic artery was controlled with a single Hemoclip placed proximally on the artery and the cystic duct controlled with 2 hemoclips placed on the cystic duct stump side and one on the infundibular side of the duct. The artery was divided with the cautery hook with no evidence of bleeding.  The cystic duct was divided with the cut mode of the cautery hook with no evidence of cystic duct stump leak.  The gallbladder was then removed from the gallbladder fossa intact.  This was done with the cautery hook.  Firefly was used to confirm no evidence of duct of Luschka or accessory duct leakage.  There was no cystic duct stump leakage.  At this time robotic instruments were removed under direct visualization.  The robot was undocked and I entered the sterile field for completion of the case.  A 5 mm laparoscope was inserted through a robotic trocar and the gallbladder was placed in a 5 mm Endo Catch bag. It was removed through the infraumbilical fascial defect.  The gallbladder fossa was hemostatic and at this time all trocars were removed under direct visualization and pneumoperitoneum was evacuated.  The infraumbilical fascial defect was closed with a figure-of-eight  Vicryl suture and all skin incisions closed with Monocryl subcuticular suture and dressed with Skin Affix.  The patient tolerated the procedure well.    Estimated Blood Loss: 10mL    Specimens:   Gallbladder           Drains: none    Grafts/Implants:  none    Complications: none      Cristino Welch MD     Date: 3/25/2022  Time: 09:54 EDT

## 2022-03-25 NOTE — ANESTHESIA PROCEDURE NOTES
"Peripheral Block      Patient reassessed immediately prior to procedure    Patient location during procedure: OR  Start time: 3/25/2022 9:15 AM  Stop time: 3/25/2022 9:18 AM  Reason for block: at surgeon's request and post-op pain management  Performed by  CRNA: Linnea Schafer CRNA  Preanesthetic Checklist  Completed: patient identified, IV checked, site marked, risks and benefits discussed, surgical consent, monitors and equipment checked, pre-op evaluation and timeout performed  Prep:  Pt Position: supine  Sterile barriers:cap, gloves, sterile barriers and mask  Prep: ChloraPrep  Patient monitoring: blood pressure monitoring, continuous pulse oximetry and EKG  Procedure    Nursing cardiac assessment comments yes: Sedation, GA, Spinal,Epidural   Performed under: general  Guidance:ultrasound guided    ULTRASOUND INTERPRETATION. Using ultrasound guidance a 20 G (20g 4\" Stimuplex) gauge needle was placed in close proximity to the nerve, at which point, under ultrasound guidance anesthetic was injected in the area of the nerve and spread of the anesthesia was seen on ultrasound in close proximity thereto.  There were no abnormalities seen on ultrasound; a digital image was taken; and the patient tolerated the procedure with no complications. Images:still images obtained    Laterality:Bilateral  Block Type:TAP  Injection Technique:single-shot  Needle Type:short-bevel  Needle Gauge:20 G  Resistance on Injection: none    Medications Used: bupivacaine PF (MARCAINE) injection 0.5%, 30 mL  Med administered at 3/25/2022 9:18 AM      Medications  Comment:Block Injection:  Total volume divided equally between Right and Left block      Post Assessment  Injection Assessment: negative aspiration for heme, incremental injection and no paresthesia on injection  Patient Tolerance:comfortable throughout block  Complications:no  Additional Notes  The pt was in the supine position under general anesthesia    Under Ultrasound " guidance, a Bowden 4inch 360 degree needle was advanced with Normal Saline hydro dissection of tissue.  The Rectus and Transversus Abdominus muscles where visualized.  The needle tip was placed between the Transversus Abdominus and rectus abdominus, local anesthetic spread was visualized in the Transversus Abdominus Plane. Injection was made incrementally with aspiration every 5 mls.  There was no  intravascular injection,  injection pressure was normal, there was no neural injection, and the procedure was completed without difficulty. The same procedure was completed for left and right sided subcostal tap blocks. Thank You.

## 2022-03-29 LAB
LAB AP CASE REPORT: NORMAL
PATH REPORT.FINAL DX SPEC: NORMAL

## 2022-04-13 ENCOUNTER — OFFICE VISIT (OUTPATIENT)
Dept: SURGERY | Facility: CLINIC | Age: 22
End: 2022-04-13

## 2022-04-13 VITALS — HEIGHT: 67 IN | BODY MASS INDEX: 35.47 KG/M2 | WEIGHT: 226 LBS

## 2022-04-13 DIAGNOSIS — K80.20 GALLSTONES: Primary | ICD-10-CM

## 2022-04-13 PROCEDURE — 99024 POSTOP FOLLOW-UP VISIT: CPT | Performed by: SURGERY

## 2022-04-13 NOTE — PROGRESS NOTES
Subjective   Liliam Crooks is a 21 y.o. female  is here today for follow-up.         Liliam Crooks is a 21 y.o. female here for followup after cholecystectomy.  The patient is doing well and tolerating diet.  Their incisions are healing well.  No complaints reported    Pathology consistent with chronic cholecystitis    Physical Exam:  NAD, AOx3  RRR  CTAB  S/appropriately tender/incisions healing well          Diagnoses and all orders for this visit:    1. Gallstones (Primary)        Assessment     21 y.o. female s/p cholecystectomy secondary to chronic cholecystitis.  The patient is doing well and will follow-up as needed.

## 2022-06-12 ENCOUNTER — HOSPITAL ENCOUNTER (EMERGENCY)
Facility: HOSPITAL | Age: 22
End: 2022-06-12

## 2022-06-19 ENCOUNTER — LAB (OUTPATIENT)
Dept: LAB | Facility: HOSPITAL | Age: 22
End: 2022-06-19

## 2022-06-19 ENCOUNTER — TRANSCRIBE ORDERS (OUTPATIENT)
Dept: ADMINISTRATIVE | Facility: HOSPITAL | Age: 22
End: 2022-06-19

## 2022-06-19 DIAGNOSIS — R11.0 NAUSEA: Primary | ICD-10-CM

## 2022-06-19 LAB — HCG INTACT+B SERPL-ACNC: <0.1 MIU/ML

## 2022-06-19 PROCEDURE — 84702 CHORIONIC GONADOTROPIN TEST: CPT | Performed by: NURSE PRACTITIONER

## 2022-06-19 PROCEDURE — 36415 COLL VENOUS BLD VENIPUNCTURE: CPT | Performed by: NURSE PRACTITIONER

## 2022-09-10 ENCOUNTER — HOSPITAL ENCOUNTER (EMERGENCY)
Facility: HOSPITAL | Age: 22
End: 2022-09-10

## 2023-02-21 ENCOUNTER — DOCUMENTATION (OUTPATIENT)
Dept: BARIATRICS/WEIGHT MGMT | Facility: CLINIC | Age: 23
End: 2023-02-21
Payer: COMMERCIAL

## 2023-02-21 ENCOUNTER — OFFICE VISIT (OUTPATIENT)
Dept: BARIATRICS/WEIGHT MGMT | Facility: CLINIC | Age: 23
End: 2023-02-21
Payer: COMMERCIAL

## 2023-02-21 VITALS
SYSTOLIC BLOOD PRESSURE: 132 MMHG | WEIGHT: 246 LBS | BODY MASS INDEX: 38.61 KG/M2 | TEMPERATURE: 97.5 F | RESPIRATION RATE: 16 BRPM | DIASTOLIC BLOOD PRESSURE: 80 MMHG | OXYGEN SATURATION: 99 % | HEIGHT: 67 IN | HEART RATE: 91 BPM

## 2023-02-21 DIAGNOSIS — K80.20 GALLSTONES: ICD-10-CM

## 2023-02-21 DIAGNOSIS — R06.00 DYSPNEA, UNSPECIFIED TYPE: ICD-10-CM

## 2023-02-21 DIAGNOSIS — I10 PRIMARY HYPERTENSION: ICD-10-CM

## 2023-02-21 DIAGNOSIS — E66.9 OBESITY, CLASS II, BMI 35-39.9: Primary | ICD-10-CM

## 2023-02-21 DIAGNOSIS — R12 HEARTBURN: ICD-10-CM

## 2023-02-21 DIAGNOSIS — R53.83 FATIGUE, UNSPECIFIED TYPE: ICD-10-CM

## 2023-02-21 PROCEDURE — 99214 OFFICE O/P EST MOD 30 MIN: CPT | Performed by: PHYSICIAN ASSISTANT

## 2023-02-21 RX ORDER — LISINOPRIL 5 MG/1
5 TABLET ORAL DAILY
COMMUNITY

## 2023-02-21 NOTE — PROGRESS NOTES
"Weight Loss Surgery  Presurgical Nutrition Assessment     Liliam Crooks  2023  [id number]  [id number]  2000   female    Surgery desired: Sleeve Gastrectomy    Height: 170.2 cm (67\")  Weight: 112 kg (246 #)  BMI: 38.53    Past Medical History:   Diagnosis Date   • Gallstones     s/p gui   • Heartburn     no prior EGD, no prior h pylori, tums prn   • History of bronchitis    • History of ear infections    • Primary hypertension 2023     Past Surgical History:   Procedure Laterality Date   • ADENOIDECTOMY     •  SECTION Bilateral 2022    Procedure:  SECTION PRIMARY;  Surgeon: Nicki Mathews DO;  Location: [facility] LABOR DELIVERY;  Service: Obstetrics/Gynecology;  Laterality: Bilateral;   • CHOLECYSTECTOMY N/A 2022    Procedure: CHOLECYSTECTOMY LAPAROSCOPIC WITH DAVINCI ROBOT;  Surgeon: Cristino Welch MD;  Location: [facility] OR;  Service: Robotics - DaVinci;  Laterality: N/A;   • EAR TUBES       No Known Allergies    Current Outpatient Medications:   •  acetaminophen (TYLENOL) 325 MG tablet, Take 2 tablets by mouth Every 4 (Four) Hours As Needed for Mild Pain ., Disp: 30 tablet, Rfl: 0  •  ibuprofen (ADVIL,MOTRIN) 600 MG tablet, Take 1 tablet by mouth Every 6 (Six) Hours As Needed for Mild Pain ., Disp: 30 tablet, Rfl: 0  •  lisinopril (PRINIVIL,ZESTRIL) 5 MG tablet, Take 5 mg by mouth Daily., Disp: , Rfl:       Assessment of Caloric needs    Estimated Current energy needs:  1915 kcal    Estimated calories for weight loss:  1600 kcal    IBW (Pounds):  160 #      Excess body weight (Pounds):  85 #       Nutrition Recall:  Example of Usual 24 hour intake:      Breakfast: at 7: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    Lunch: at noon = catered Mexican meal at work = rice, cheese, chicken and tortilla chips    Dinner: at 6 pm = a fried ham and cheese sandwich    Snacks: none stated or recorded    Beverages of Choice: mostly water - " no coffee, soda or sweet tea    Food Allergies or Intolerances: none stated or recorded          Exercise: no activity or exercise regimen at this time       Assessment of Nutritional Adequacy, Excessive Intake or Deficiencies: Diet is low in protein and high in processed food, especially carbohydrate        Education    Provided Nutrition Guidelines for Bariatric and Metabolic Surgery   1. Reviewed guidelines for higher protein, limited carbohydrate diet to promote weight loss.  Encouraged patient to incorporate these principles of healthy eating    2. Encouraged patient to choose an acceptable protein supplement beverage for the post-surgery liquid diet.  Provided product guidelines and examples.    3. Explained importance of goal setting to help in changing eating behaviors that are not conducive to weight loss.  Specific macronutrient goals as below.   4. Provided follow-up options for support, including contact information for dietitians here.   Web-based support information and apps for smart phones and computers given.        Nutrition Goals   Protein goal:  grams per day in three regular balanced meals and two to three high protein snacks each day, to ensure desired weight loss.   Carbohydrate goal:  100-140 grams per day  Beverage goal: Appropriate non-carbonated beverage intake     Exercise Goals  1. Continue current exercise routine, adding 15-30 minutes of activity per day as tolerated and per medical advice.   2. Start activity plan per medical advice if not currently exercising.     Recommend that team proceed with surgery and follow per protocol.   Belen Gaffeny RD  02/21/2023  11:39 EST

## 2023-02-21 NOTE — PROGRESS NOTES
Weight Loss Surgery  Presurgical Nutrition Assessment     Liliam Crooks  2023  [id number]  [id number]  2000   female    Surgery desired: Sleeve Gastrectomy    Height: ***  Weight: ***  BMI: ***    Past Medical History:   Diagnosis Date   • Gallstones     s/p gui   • Heartburn     no prior EGD, no prior h pylori, tums prn   • History of bronchitis    • History of ear infections    • Primary hypertension 2023     Past Surgical History:   Procedure Laterality Date   • ADENOIDECTOMY     •  SECTION Bilateral 2022    Procedure:  SECTION PRIMARY;  Surgeon: Nicki Mathews DO;  Location: [facility] LABOR DELIVERY;  Service: Obstetrics/Gynecology;  Laterality: Bilateral;   • CHOLECYSTECTOMY N/A 2022    Procedure: CHOLECYSTECTOMY LAPAROSCOPIC WITH DAVINCI ROBOT;  Surgeon: Cristino Welch MD;  Location: [facility] OR;  Service: Robotics - DaVinci;  Laterality: N/A;   • EAR TUBES       No Known Allergies    Current Outpatient Medications:   •  acetaminophen (TYLENOL) 325 MG tablet, Take 2 tablets by mouth Every 4 (Four) Hours As Needed for Mild Pain ., Disp: 30 tablet, Rfl: 0  •  ibuprofen (ADVIL,MOTRIN) 600 MG tablet, Take 1 tablet by mouth Every 6 (Six) Hours As Needed for Mild Pain ., Disp: 30 tablet, Rfl: 0  •  lisinopril (PRINIVIL,ZESTRIL) 5 MG tablet, Take 5 mg by mouth Daily., Disp: , Rfl:       Assessment of Caloric needs    Estimated Current energy needs:  *** kcal    Estimated calories for weight loss:  *** kcal    IBW (Pounds):  *** #      Excess body weight (Pounds):  *** #       Nutrition Recall:  Example of Usual 24 hour intake: ***     Breakfast: ***    Lunch: ***    Dinner: ***    Snacks: ***    Beverages of Choice: ***    Food Allergies or Intolerances: ***          Exercise: ***       Assessment of Nutritional Adequacy, Excessive Intake or Deficiencies: ***        Education    Provided Nutrition Guidelines for Bariatric and Metabolic Surgery    1. Reviewed guidelines for higher protein, limited carbohydrate diet to promote weight loss.  Encouraged patient to incorporate these principles of healthy eating    2. Encouraged patient to choose an acceptable protein supplement beverage for the post-surgery liquid diet.  Provided product guidelines and examples.    3. Explained importance of goal setting to help in changing eating behaviors that are not conducive to weight loss.  Specific macronutrient goals as below.   4. Provided follow-up options for support, including contact information for dietitians here.   Web-based support information and apps for smart phones and computers given.        Nutrition Goals   Protein goal:  grams per day in three regular balanced meals and two to three high protein snacks each day, to ensure desired weight loss.   Carbohydrate goal:  100-140 grams per day  Beverage goal: Appropriate non-carbonated beverage intake     Exercise Goals  1. Continue current exercise routine, adding 15-30 minutes of activity per day as tolerated and per medical advice.   2. Start activity plan per medical advice if not currently exercising.     Recommend that team proceed with surgery and follow per protocol.   Belen Gaffney RD  02/21/2023  10:39 EST

## 2023-02-22 LAB
25(OH)D3+25(OH)D2 SERPL-MCNC: 29 NG/ML (ref 30–100)
ALBUMIN SERPL-MCNC: 4.3 G/DL (ref 3.9–5)
ALBUMIN/GLOB SERPL: 1.5 {RATIO} (ref 1.2–2.2)
ALP SERPL-CCNC: 99 IU/L (ref 44–121)
ALT SERPL-CCNC: 52 IU/L (ref 0–32)
AST SERPL-CCNC: 29 IU/L (ref 0–40)
BASOPHILS # BLD AUTO: 0 X10E3/UL (ref 0–0.2)
BASOPHILS NFR BLD AUTO: 0 %
BILIRUB SERPL-MCNC: 0.4 MG/DL (ref 0–1.2)
BUN SERPL-MCNC: 14 MG/DL (ref 6–20)
BUN/CREAT SERPL: 15 (ref 9–23)
CALCIUM SERPL-MCNC: 9.3 MG/DL (ref 8.7–10.2)
CHLORIDE SERPL-SCNC: 104 MMOL/L (ref 96–106)
CHOLEST SERPL-MCNC: 146 MG/DL (ref 100–199)
CO2 SERPL-SCNC: 20 MMOL/L (ref 20–29)
CREAT SERPL-MCNC: 0.91 MG/DL (ref 0.57–1)
EGFRCR SERPLBLD CKD-EPI 2021: 91 ML/MIN/1.73
EOSINOPHIL # BLD AUTO: 0.1 X10E3/UL (ref 0–0.4)
EOSINOPHIL NFR BLD AUTO: 1 %
ERYTHROCYTE [DISTWIDTH] IN BLOOD BY AUTOMATED COUNT: 12.9 % (ref 11.7–15.4)
FERRITIN SERPL-MCNC: 80 NG/ML (ref 15–150)
FOLATE SERPL-MCNC: 6.1 NG/ML
GLOBULIN SER CALC-MCNC: 2.9 G/DL (ref 1.5–4.5)
GLUCOSE SERPL-MCNC: 81 MG/DL (ref 70–99)
HBA1C MFR BLD: 5 % (ref 4.8–5.6)
HCT VFR BLD AUTO: 43.6 % (ref 34–46.6)
HDLC SERPL-MCNC: 36 MG/DL
HGB BLD-MCNC: 14.8 G/DL (ref 11.1–15.9)
IMM GRANULOCYTES # BLD AUTO: 0 X10E3/UL (ref 0–0.1)
IMM GRANULOCYTES NFR BLD AUTO: 0 %
IRON SERPL-MCNC: 96 UG/DL (ref 27–159)
LDLC SERPL CALC-MCNC: 93 MG/DL (ref 0–99)
LYMPHOCYTES # BLD AUTO: 2.2 X10E3/UL (ref 0.7–3.1)
LYMPHOCYTES NFR BLD AUTO: 29 %
MCH RBC QN AUTO: 30.3 PG (ref 26.6–33)
MCHC RBC AUTO-ENTMCNC: 33.9 G/DL (ref 31.5–35.7)
MCV RBC AUTO: 89 FL (ref 79–97)
MONOCYTES # BLD AUTO: 0.7 X10E3/UL (ref 0.1–0.9)
MONOCYTES NFR BLD AUTO: 9 %
NEUTROPHILS # BLD AUTO: 4.5 X10E3/UL (ref 1.4–7)
NEUTROPHILS NFR BLD AUTO: 61 %
PLATELET # BLD AUTO: 323 X10E3/UL (ref 150–450)
POTASSIUM SERPL-SCNC: 4.9 MMOL/L (ref 3.5–5.2)
PROT SERPL-MCNC: 7.2 G/DL (ref 6–8.5)
RBC # BLD AUTO: 4.89 X10E6/UL (ref 3.77–5.28)
SODIUM SERPL-SCNC: 139 MMOL/L (ref 134–144)
TRIGL SERPL-MCNC: 90 MG/DL (ref 0–149)
TSH SERPL DL<=0.005 MIU/L-ACNC: 0.93 UIU/ML (ref 0.45–4.5)
UREA BREATH TEST QL: NEGATIVE
VIT B12 SERPL-MCNC: 298 PG/ML (ref 232–1245)
VLDLC SERPL CALC-MCNC: 17 MG/DL (ref 5–40)
WBC # BLD AUTO: 7.4 X10E3/UL (ref 3.4–10.8)

## 2023-03-06 ENCOUNTER — OFFICE VISIT (OUTPATIENT)
Dept: CARDIOLOGY | Facility: CLINIC | Age: 23
End: 2023-03-06
Payer: COMMERCIAL

## 2023-03-06 ENCOUNTER — OUTSIDE FACILITY SERVICE (OUTPATIENT)
Dept: BARIATRICS/WEIGHT MGMT | Facility: CLINIC | Age: 23
End: 2023-03-06
Payer: COMMERCIAL

## 2023-03-06 VITALS
DIASTOLIC BLOOD PRESSURE: 86 MMHG | OXYGEN SATURATION: 98 % | WEIGHT: 250 LBS | HEIGHT: 67 IN | SYSTOLIC BLOOD PRESSURE: 132 MMHG | BODY MASS INDEX: 39.24 KG/M2 | HEART RATE: 92 BPM

## 2023-03-06 DIAGNOSIS — R12 HEARTBURN: ICD-10-CM

## 2023-03-06 DIAGNOSIS — R06.00 DYSPNEA, UNSPECIFIED TYPE: Primary | ICD-10-CM

## 2023-03-06 DIAGNOSIS — R94.31 ABNORMAL EKG: ICD-10-CM

## 2023-03-06 DIAGNOSIS — I10 PRIMARY HYPERTENSION: ICD-10-CM

## 2023-03-06 DIAGNOSIS — Z01.810 PREOPERATIVE CARDIOVASCULAR EXAMINATION: ICD-10-CM

## 2023-03-06 DIAGNOSIS — E66.9 OBESITY (BMI 30-39.9): ICD-10-CM

## 2023-03-06 PROCEDURE — 99214 OFFICE O/P EST MOD 30 MIN: CPT | Performed by: NURSE PRACTITIONER

## 2023-03-06 PROCEDURE — 1159F MED LIST DOCD IN RCRD: CPT | Performed by: NURSE PRACTITIONER

## 2023-03-06 PROCEDURE — 3075F SYST BP GE 130 - 139MM HG: CPT | Performed by: NURSE PRACTITIONER

## 2023-03-06 PROCEDURE — 43239 EGD BIOPSY SINGLE/MULTIPLE: CPT | Performed by: SURGERY

## 2023-03-06 PROCEDURE — 3079F DIAST BP 80-89 MM HG: CPT | Performed by: NURSE PRACTITIONER

## 2023-03-06 PROCEDURE — 93000 ELECTROCARDIOGRAM COMPLETE: CPT | Performed by: NURSE PRACTITIONER

## 2023-03-06 PROCEDURE — 1160F RVW MEDS BY RX/DR IN RCRD: CPT | Performed by: NURSE PRACTITIONER

## 2023-03-06 NOTE — PROGRESS NOTES
Subjective     Liliam Crooks is a 22 y.o. female.   Chief Complaint   Patient presents with   • Pre-op Exam     Clearance for sleeve surgery      History of Present Illness   Liliam Crooks is a 22-year-old female who presents to clinic today as a new patient for cardiology evaluation.    She was referred by Leandra Shaffer for preoperative cardiovascular exam.  She plans to undergo a gastric procedure for weight loss surgery.  She has suffered from obesity most of her life and despite lifestyle modifications and has been unsuccessful at weight loss.    Cardiac risk factors are hypertension currently on lisinopril 5 mg daily.  She reports home BPs are controlled around 130/80.  She denies chest pain.  Denies history of hyperlipidemia or DM.  Family history is significant for maternal grandmother with cardiovascular disease.  Experience dyspnea with activity.  She denies palpitations.  She does currently vape.     Patient Active Problem List   Diagnosis   • Weakness   • Influenza A   • Postpartum care following  delivery   • Gallstones   • Heartburn   • Primary hypertension     Past Medical History:   Diagnosis Date   • Gallstones     s/p gui   • Heartburn     no prior EGD, no prior h pylori, tums prn   • History of bronchitis    • History of ear infections    • Primary hypertension 2023     Past Surgical History:   Procedure Laterality Date   • ADENOIDECTOMY     •  SECTION Bilateral 2022    Procedure:  SECTION PRIMARY;  Surgeon: Nicki Mathews DO;  Location: [facility] LABOR DELIVERY;  Service: Obstetrics/Gynecology;  Laterality: Bilateral;   • CHOLECYSTECTOMY N/A 2022    Procedure: CHOLECYSTECTOMY LAPAROSCOPIC WITH DAVINCI ROBOT;  Surgeon: Cristino Welch MD;  Location: [facility] OR;  Service: Robotics - DaVinci;  Laterality: N/A;   • EAR TUBES         Family History   Problem Relation Age of Onset   • No Known Problems Mother    • Hypertension Father    •  Obesity Father    • Diabetes Maternal Aunt    • Diabetes Paternal Aunt    • Hypertension Maternal Grandmother    • Heart attack Maternal Grandmother 68   • Cancer Paternal Grandmother    • Hypertension Paternal Grandmother    • Hypertension Paternal Grandfather      Social History     Tobacco Use   • Smoking status: Never   • Smokeless tobacco: Never   Vaping Use   • Vaping Use: Former   • Quit date: 12/12/2021   • Substances: Nicotine, Flavoring   • Devices: Disposable, Pre-filled or refillable cartridge   Substance Use Topics   • Alcohol use: Yes     Comment: social-  3 drinks twice monthly   • Drug use: No         The following portions of the patient's history were reviewed and updated as appropriate: allergies, current medications, past family history, past medical history, past social history, past surgical history and problem list.    No Known Allergies      Current Outpatient Medications:   •  acetaminophen (TYLENOL) 325 MG tablet, Take 2 tablets by mouth Every 4 (Four) Hours As Needed for Mild Pain ., Disp: 30 tablet, Rfl: 0  •  ibuprofen (ADVIL,MOTRIN) 600 MG tablet, Take 1 tablet by mouth Every 6 (Six) Hours As Needed for Mild Pain ., Disp: 30 tablet, Rfl: 0  •  lisinopril (PRINIVIL,ZESTRIL) 5 MG tablet, Take 1 tablet by mouth Daily., Disp: , Rfl:     Review of Systems   Constitutional: Negative for activity change, appetite change, chills, diaphoresis, fatigue and fever.   HENT: Negative for congestion, drooling, ear discharge, ear pain, mouth sores, nosebleeds, postnasal drip, rhinorrhea, sinus pressure, sneezing and sore throat.    Eyes: Negative for pain, discharge and visual disturbance.   Respiratory: Positive for shortness of breath. Negative for cough, chest tightness and wheezing.    Cardiovascular: Negative for chest pain, palpitations and leg swelling.   Gastrointestinal: Negative for abdominal pain, constipation, diarrhea, nausea and vomiting.   Endocrine: Negative for cold intolerance, heat  "intolerance, polydipsia, polyphagia and polyuria.   Musculoskeletal: Negative for arthralgias, myalgias and neck pain.   Skin: Negative for rash and wound.   Neurological: Negative for dizziness, syncope, speech difficulty, weakness, light-headedness and headaches.   Hematological: Negative for adenopathy. Does not bruise/bleed easily.   Psychiatric/Behavioral: Negative for confusion, dysphoric mood and sleep disturbance. The patient is not nervous/anxious.    All other systems reviewed and are negative.    /86 (BP Location: Left arm, Patient Position: Sitting, Cuff Size: Adult)   Pulse 92   Ht 170.2 cm (67\")   Wt 113 kg (250 lb)   SpO2 98%   BMI 39.16 kg/m²     Objective   No Known Allergies    Physical Exam  Vitals reviewed.   Constitutional:       Appearance: Normal appearance. She is well-developed. She is obese.   HENT:      Head: Normocephalic.   Eyes:      Conjunctiva/sclera: Conjunctivae normal.   Neck:      Vascular: No carotid bruit or JVD.   Cardiovascular:      Rate and Rhythm: Normal rate and regular rhythm.   Pulmonary:      Effort: Pulmonary effort is normal.      Breath sounds: Normal breath sounds.   Musculoskeletal:      Cervical back: Neck supple.      Right lower leg: No edema.      Left lower leg: No edema.   Skin:     General: Skin is warm and dry.   Neurological:      Mental Status: She is alert and oriented to person, place, and time.   Psychiatric:         Attention and Perception: Attention normal.         Mood and Affect: Mood normal.         Speech: Speech normal.         Behavior: Behavior normal. Behavior is cooperative.         Cognition and Memory: Cognition normal.           ECG 12 Lead    Date/Time: 3/6/2023 2:11 PM  Performed by: Abdia Henao APRN  Authorized by: Abida Henao APRN   Comparison: compared with previous ECG   Similar to previous ECG  Rhythm: sinus rhythm  Rate: normal  BPM: 78  Other findings: T wave abnormality    Clinical impression: " non-specific ECG  Comments: QT/QTc - 365/398            LABS  WBC   Date Value Ref Range Status   02/21/2023 7.4 3.4 - 10.8 x10E3/uL Final     RBC   Date Value Ref Range Status   02/21/2023 4.89 3.77 - 5.28 x10E6/uL Final     Hemoglobin   Date Value Ref Range Status   02/21/2023 14.8 11.1 - 15.9 g/dL Final   03/23/2022 13.3 12.0 - 15.9 g/dL Final     Hematocrit   Date Value Ref Range Status   02/21/2023 43.6 34.0 - 46.6 % Final   03/23/2022 40.4 34.0 - 46.6 % Final     MCV   Date Value Ref Range Status   02/21/2023 89 79 - 97 fL Final   03/23/2022 84.0 79.0 - 97.0 fL Final     MCH   Date Value Ref Range Status   02/21/2023 30.3 26.6 - 33.0 pg Final   03/23/2022 27.7 26.6 - 33.0 pg Final     MCHC   Date Value Ref Range Status   02/21/2023 33.9 31.5 - 35.7 g/dL Final   03/23/2022 32.9 31.5 - 35.7 g/dL Final     RDW   Date Value Ref Range Status   02/21/2023 12.9 11.7 - 15.4 % Final   03/23/2022 13.9 12.3 - 15.4 % Final     RDW-SD   Date Value Ref Range Status   03/23/2022 42.5 37.0 - 54.0 fl Final     MPV   Date Value Ref Range Status   03/23/2022 9.0 6.0 - 12.0 fL Final     Platelets   Date Value Ref Range Status   02/21/2023 323 150 - 450 x10E3/uL Final   03/23/2022 302 140 - 450 10*3/mm3 Final     Neutrophil Rel %   Date Value Ref Range Status   02/21/2023 61 Not Estab. % Final     Neutrophil %   Date Value Ref Range Status   03/23/2022 65.4 42.7 - 76.0 % Final     Lymphocyte Rel %   Date Value Ref Range Status   02/21/2023 29 Not Estab. % Final     Lymphocyte %   Date Value Ref Range Status   03/23/2022 23.8 19.6 - 45.3 % Final     Monocyte Rel %   Date Value Ref Range Status   02/21/2023 9 Not Estab. % Final     Monocyte %   Date Value Ref Range Status   03/23/2022 8.8 5.0 - 12.0 % Final     Eosinophil Rel %   Date Value Ref Range Status   02/21/2023 1 Not Estab. % Final     Eosinophil %   Date Value Ref Range Status   03/23/2022 1.5 0.3 - 6.2 % Final     Basophil Rel %   Date Value Ref Range Status   02/21/2023  0 Not Estab. % Final     Basophil %   Date Value Ref Range Status   03/23/2022 0.3 0.0 - 1.5 % Final     Immature Grans %   Date Value Ref Range Status   03/23/2022 0.2 0.0 - 0.5 % Final     Neutrophils Absolute   Date Value Ref Range Status   02/21/2023 4.5 1.4 - 7.0 x10E3/uL Final     Neutrophils, Absolute   Date Value Ref Range Status   03/23/2022 4.29 1.70 - 7.00 10*3/mm3 Final     Lymphocytes Absolute   Date Value Ref Range Status   02/21/2023 2.2 0.7 - 3.1 x10E3/uL Final     Lymphocytes, Absolute   Date Value Ref Range Status   03/23/2022 1.56 0.70 - 3.10 10*3/mm3 Final     Monocytes Absolute   Date Value Ref Range Status   02/21/2023 0.7 0.1 - 0.9 x10E3/uL Final     Monocytes, Absolute   Date Value Ref Range Status   03/23/2022 0.58 0.10 - 0.90 10*3/mm3 Final     Eosinophils Absolute   Date Value Ref Range Status   02/21/2023 0.1 0.0 - 0.4 x10E3/uL Final     Eosinophils, Absolute   Date Value Ref Range Status   03/23/2022 0.10 0.00 - 0.40 10*3/mm3 Final     Basophils Absolute   Date Value Ref Range Status   02/21/2023 0.0 0.0 - 0.2 x10E3/uL Final     Basophils, Absolute   Date Value Ref Range Status   03/23/2022 0.02 0.00 - 0.20 10*3/mm3 Final     Immature Grans, Absolute   Date Value Ref Range Status   03/23/2022 0.01 0.00 - 0.05 10*3/mm3 Final     nRBC   Date Value Ref Range Status   03/23/2022 0.0 0.0 - 0.2 /100 WBC Final         Triglycerides   Date Value Ref Range Status   02/21/2023 90 0 - 149 mg/dL Final     HDL Cholesterol   Date Value Ref Range Status   02/21/2023 36 (L) >39 mg/dL Final     LDL Chol Calc (NIH)   Date Value Ref Range Status   02/21/2023 93 0 - 99 mg/dL Final     ECHOCARDIOGRAM 12/18/2021  Interpretation Summary    • Left ventricular ejection fraction appears to be 66 - 70%. Left ventricular systolic function is normal.  • Left ventricular diastolic function was normal.  • Estimated right ventricular systolic pressure from tricuspid regurgitation is normal (<35 mmHg).              Assessment & Plan   Diagnoses and all orders for this visit:    1. Dyspnea, unspecified type (Primary)  -     Adult Transthoracic Echo Complete W/ Cont if Necessary Per Protocol; Future  Further evaluation will be arranged    2. Abnormal EKG  -     ECG 12 Lead  -     Treadmill Stress Test; Future  EKG, reviewed and discussed  Ischemic evaluation will be arranged    3. Preoperative cardiovascular examination  -     ECG 12 Lead  -     Treadmill Stress Test; Future  Further testing will be arranged    4. Primary hypertension  Stable, continue current therapy, sodium restrictions, routine monitoring    5. Obesity (BMI 30-39.9)  Lifestyle modifications including heart healthy diet, regular exercise, maintenance of desirable body weight and avoidance of tobacco products.    Review of medical record  Recommend avoidance of using vape  Follow-up in 4 weeks to discuss and review testing, sooner if needed.

## 2023-03-21 ENCOUNTER — IMMUNIZATION (OUTPATIENT)
Dept: VACCINE CLINIC | Facility: HOSPITAL | Age: 23
End: 2023-03-21
Payer: COMMERCIAL

## 2023-03-21 ENCOUNTER — HOSPITAL ENCOUNTER (OUTPATIENT)
Dept: CARDIOLOGY | Facility: HOSPITAL | Age: 23
Discharge: HOME | End: 2023-03-21
Payer: COMMERCIAL

## 2023-03-21 DIAGNOSIS — R06.00 DYSPNEA, UNSPECIFIED TYPE: ICD-10-CM

## 2023-03-21 DIAGNOSIS — R94.31 ABNORMAL EKG: ICD-10-CM

## 2023-03-21 DIAGNOSIS — Z01.810 PREOPERATIVE CARDIOVASCULAR EXAMINATION: ICD-10-CM

## 2023-03-21 DIAGNOSIS — Z23 NEED FOR VACCINATION: Primary | ICD-10-CM

## 2023-03-21 LAB
BH CV STRESS BP STAGE 1: NORMAL
BH CV STRESS BP STAGE 2: NORMAL
BH CV STRESS DURATION MIN STAGE 1: 3
BH CV STRESS DURATION MIN STAGE 2: 3
BH CV STRESS DURATION MIN STAGE 3: 2
BH CV STRESS DURATION SEC STAGE 1: 0
BH CV STRESS DURATION SEC STAGE 2: 0
BH CV STRESS DURATION SEC STAGE 3: 15
BH CV STRESS GRADE STAGE 1: 10
BH CV STRESS GRADE STAGE 2: 12
BH CV STRESS GRADE STAGE 3: 14
BH CV STRESS HR STAGE 1: 142
BH CV STRESS HR STAGE 2: 166
BH CV STRESS HR STAGE 3: 181
BH CV STRESS METS STAGE 1: 5
BH CV STRESS METS STAGE 2: 7.5
BH CV STRESS METS STAGE 3: 10
BH CV STRESS PROTOCOL 1: NORMAL
BH CV STRESS RECOVERY BP: NORMAL MMHG
BH CV STRESS RECOVERY HR: 110 BPM
BH CV STRESS SPEED STAGE 1: 1.7
BH CV STRESS SPEED STAGE 2: 2.5
BH CV STRESS SPEED STAGE 3: 3.4
BH CV STRESS STAGE 1: 1
BH CV STRESS STAGE 2: 2
BH CV STRESS STAGE 3: 3
MAXIMAL PREDICTED HEART RATE: 198 BPM
PERCENT MAX PREDICTED HR: 91.41 %
STRESS BASELINE BP: NORMAL MMHG
STRESS BASELINE HR: 90 BPM
STRESS PERCENT HR: 108 %
STRESS POST ESTIMATED WORKLOAD: 10.1 METS
STRESS POST EXERCISE DUR MIN: 8 MIN
STRESS POST EXERCISE DUR SEC: 15 SEC
STRESS POST PEAK BP: NORMAL MMHG
STRESS POST PEAK HR: 181 BPM
STRESS TARGET HR: 168 BPM

## 2023-03-21 PROCEDURE — 93306 TTE W/DOPPLER COMPLETE: CPT | Performed by: INTERNAL MEDICINE

## 2023-03-21 PROCEDURE — 93017 CV STRESS TEST TRACING ONLY: CPT

## 2023-03-21 PROCEDURE — 93018 CV STRESS TEST I&R ONLY: CPT | Performed by: INTERNAL MEDICINE

## 2023-03-21 PROCEDURE — 0124A: CPT | Performed by: INTERNAL MEDICINE

## 2023-03-21 PROCEDURE — 93306 TTE W/DOPPLER COMPLETE: CPT

## 2023-03-21 PROCEDURE — 91312 HC SARSCOV2 VAC 30MCG/0.3ML IM BIVALENT BOOSTER 12 YRS AND OLDER: CPT | Performed by: INTERNAL MEDICINE

## 2023-03-22 ENCOUNTER — TELEPHONE (OUTPATIENT)
Dept: CARDIOLOGY | Facility: CLINIC | Age: 23
End: 2023-03-22
Payer: COMMERCIAL

## 2023-03-22 NOTE — TELEPHONE ENCOUNTER
Called pt and informed her of below message per thien martinez. Pt stated she would call her pcp to increase her dosage to 10mg and keep record of bp for follow up with thien.    Thien Henao APRN  [facility]  BP elevated with treadmill stress test, I would recommend increasing Lisinopril from 5 mg to 10 mg daily and keep record of BP and return at follow up visit.

## 2023-03-22 NOTE — TELEPHONE ENCOUNTER
----- Message from MONA Layton sent at 3/22/2023  2:34 PM EDT -----  BP elevated with treadmill stress test, I would recommend increasing Lisinopril from 5 mg to 10 mg daily and keep record of BP and return at follow up visit.

## 2023-03-25 LAB
BH CV ECHO MEAS - AO MAX PG: 5.4 MMHG
BH CV ECHO MEAS - AO MEAN PG: 3 MMHG
BH CV ECHO MEAS - AO ROOT DIAM: 2.5 CM
BH CV ECHO MEAS - AO V2 MAX: 116 CM/SEC
BH CV ECHO MEAS - AO V2 VTI: 26 CM
BH CV ECHO MEAS - EDV(CUBED): 110.6 ML
BH CV ECHO MEAS - EDV(MOD-SP4): 44.2 ML
BH CV ECHO MEAS - EF(MOD-SP4): 50.5 %
BH CV ECHO MEAS - ESV(CUBED): 39.3 ML
BH CV ECHO MEAS - ESV(MOD-SP4): 21.9 ML
BH CV ECHO MEAS - FS: 29.2 %
BH CV ECHO MEAS - IVS/LVPW: 0.9 CM
BH CV ECHO MEAS - IVSD: 0.9 CM
BH CV ECHO MEAS - LA DIMENSION: 3.1 CM
BH CV ECHO MEAS - LAT PEAK E' VEL: 16 CM/SEC
BH CV ECHO MEAS - LV DIASTOLIC VOL/BSA (35-75): 19.9 CM2
BH CV ECHO MEAS - LV MASS(C)D: 158.8 GRAMS
BH CV ECHO MEAS - LV SYSTOLIC VOL/BSA (12-30): 9.9 CM2
BH CV ECHO MEAS - LVIDD: 4.8 CM
BH CV ECHO MEAS - LVIDS: 3.4 CM
BH CV ECHO MEAS - LVOT AREA: 3.1 CM2
BH CV ECHO MEAS - LVOT DIAM: 2 CM
BH CV ECHO MEAS - LVPWD: 1 CM
BH CV ECHO MEAS - MED PEAK E' VEL: 11.9 CM/SEC
BH CV ECHO MEAS - MV A MAX VEL: 50.6 CM/SEC
BH CV ECHO MEAS - MV E MAX VEL: 75 CM/SEC
BH CV ECHO MEAS - MV E/A: 1.48
BH CV ECHO MEAS - PA ACC TIME: 0.1 SEC
BH CV ECHO MEAS - PA PR(ACCEL): 33.1 MMHG
BH CV ECHO MEAS - SI(MOD-SP4): 10 ML/M2
BH CV ECHO MEAS - SV(MOD-SP4): 22.3 ML
BH CV ECHO MEAS - TAPSE (>1.6): 3 CM
BH CV ECHO MEASUREMENTS AVERAGE E/E' RATIO: 5.38
LEFT ATRIUM VOLUME INDEX: 15.3 ML/M2
LV EF 2D ECHO EST: 60 %
MAXIMAL PREDICTED HEART RATE: 198 BPM
STRESS TARGET HR: 168 BPM

## 2023-03-30 ENCOUNTER — TELEPHONE (OUTPATIENT)
Dept: CARDIOLOGY | Facility: CLINIC | Age: 23
End: 2023-03-30
Payer: COMMERCIAL

## 2023-03-30 NOTE — TELEPHONE ENCOUNTER
Called pt and informed her that her stress test and echo was ok       Abida Henao APRN [facility]  Stress test and echocardiogram are ok, will plan to discuss further at follow up appt.

## 2023-03-30 NOTE — TELEPHONE ENCOUNTER
----- Message from MONA Layton sent at 3/30/2023 10:25 AM EDT -----  Stress test and echocardiogram are ok, will plan to discuss further at follow up appt.

## 2023-04-11 ENCOUNTER — OFFICE VISIT (OUTPATIENT)
Dept: CARDIOLOGY | Facility: CLINIC | Age: 23
End: 2023-04-11
Payer: COMMERCIAL

## 2023-04-11 VITALS
OXYGEN SATURATION: 99 % | HEIGHT: 67 IN | WEIGHT: 244.8 LBS | DIASTOLIC BLOOD PRESSURE: 81 MMHG | BODY MASS INDEX: 38.42 KG/M2 | HEART RATE: 84 BPM | SYSTOLIC BLOOD PRESSURE: 129 MMHG

## 2023-04-11 DIAGNOSIS — Z01.810 PREOPERATIVE CARDIOVASCULAR EXAMINATION: Primary | ICD-10-CM

## 2023-04-11 DIAGNOSIS — E66.9 OBESITY (BMI 30-39.9): ICD-10-CM

## 2023-04-11 DIAGNOSIS — I10 PRIMARY HYPERTENSION: ICD-10-CM

## 2023-04-11 NOTE — PROGRESS NOTES
Subjective     Liliam Crooks is a 22 y.o. female.   Chief Complaint   Patient presents with   • Results     Follow up on stress test results      History of Present Illness   Liliam Crooks is a 22-year-old female who presents to clinic today for cardiology follow-up.    She was initially seen for preoperative evaluation for gastric procedure.  She has underwent testing and is here today to discuss the results.  Denies chest pain, dyspnea or palpitations.    Hypertension with recent recommendations to increase her lisinopril.  She reports continuing on 10 mg daily.  She denies medication side effects and reports compliance.  Home monitoring of BP is controlled and improved.  She denies lower extremity swelling.  She has IUD with no immediate planes of pregnancy.    Patient Active Problem List   Diagnosis   • Weakness   • Influenza A   • Postpartum care following  delivery   • Gallstones   • Heartburn   • Primary hypertension     Past Medical History:   Diagnosis Date   • Gallstones     s/p gui   • Heartburn     no prior EGD, no prior h pylori, tums prn   • History of bronchitis    • History of ear infections    • Primary hypertension 2023     Past Surgical History:   Procedure Laterality Date   • ADENOIDECTOMY     •  SECTION Bilateral 2022    Procedure:  SECTION PRIMARY;  Surgeon: Nicki Mathews DO;  Location: [facility] LABOR DELIVERY;  Service: Obstetrics/Gynecology;  Laterality: Bilateral;   • CHOLECYSTECTOMY N/A 2022    Procedure: CHOLECYSTECTOMY LAPAROSCOPIC WITH DAVINCI ROBOT;  Surgeon: Cristino Welch MD;  Location: [facility] OR;  Service: Robotics - DaVinci;  Laterality: N/A;   • EAR TUBES         Family History   Problem Relation Age of Onset   • No Known Problems Mother    • Hypertension Father    • Obesity Father    • Diabetes Maternal Aunt    • Diabetes Paternal Aunt    • Hypertension Maternal Grandmother    • Heart attack Maternal Grandmother 68    • Cancer Paternal Grandmother    • Hypertension Paternal Grandmother    • Hypertension Paternal Grandfather      Social History     Tobacco Use   • Smoking status: Never   • Smokeless tobacco: Never   Vaping Use   • Vaping Use: Former   • Quit date: 12/12/2021   • Substances: Nicotine, Flavoring   • Devices: Disposable, Pre-filled or refillable cartridge   Substance Use Topics   • Alcohol use: Yes     Comment: social-  3 drinks twice monthly   • Drug use: No         The following portions of the patient's history were reviewed and updated as appropriate: allergies, current medications, past family history, past medical history, past social history, past surgical history and problem list.    No Known Allergies      Current Outpatient Medications:   •  acetaminophen (TYLENOL) 325 MG tablet, Take 2 tablets by mouth Every 4 (Four) Hours As Needed for Mild Pain ., Disp: 30 tablet, Rfl: 0  •  ibuprofen (ADVIL,MOTRIN) 600 MG tablet, Take 1 tablet by mouth Every 6 (Six) Hours As Needed for Mild Pain ., Disp: 30 tablet, Rfl: 0  •  lisinopril (PRINIVIL,ZESTRIL) 5 MG tablet, Take 2 tablets by mouth Daily., Disp: , Rfl:     Review of Systems   Constitutional: Negative for activity change, appetite change, chills, diaphoresis, fatigue and fever.   HENT: Negative for congestion, drooling, ear discharge, ear pain, mouth sores, nosebleeds, postnasal drip, rhinorrhea, sinus pressure, sneezing and sore throat.    Eyes: Negative for pain, discharge and visual disturbance.   Respiratory: Negative for cough, chest tightness, shortness of breath and wheezing.    Cardiovascular: Negative for chest pain, palpitations and leg swelling.   Gastrointestinal: Negative for abdominal pain, constipation, diarrhea, nausea and vomiting.   Endocrine: Negative for cold intolerance, heat intolerance, polydipsia, polyphagia and polyuria.   Musculoskeletal: Negative for arthralgias, myalgias and neck pain.   Skin: Negative for rash and wound.  "  Neurological: Negative for dizziness, syncope, speech difficulty, weakness, light-headedness and headaches.   Hematological: Negative for adenopathy. Does not bruise/bleed easily.   Psychiatric/Behavioral: Negative for confusion, dysphoric mood and sleep disturbance. The patient is not nervous/anxious.    All other systems reviewed and are negative.    /81 (BP Location: Right arm, Patient Position: Sitting, Cuff Size: Adult)   Pulse 84   Ht 170.2 cm (67\")   Wt 111 kg (244 lb 12.8 oz)   SpO2 99%   BMI 38.34 kg/m²     Objective   No Known Allergies    Physical Exam  Vitals reviewed.   Constitutional:       Appearance: Normal appearance. She is well-developed. She is obese.   HENT:      Head: Normocephalic.   Eyes:      Conjunctiva/sclera: Conjunctivae normal.   Neck:      Vascular: No carotid bruit or JVD.   Cardiovascular:      Rate and Rhythm: Normal rate and regular rhythm.   Pulmonary:      Effort: Pulmonary effort is normal.      Breath sounds: Normal breath sounds.   Musculoskeletal:      Cervical back: Neck supple.      Right lower leg: No edema.      Left lower leg: No edema.   Skin:     General: Skin is warm and dry.   Neurological:      Mental Status: She is alert and oriented to person, place, and time.   Psychiatric:         Attention and Perception: Attention normal.         Mood and Affect: Mood normal.         Speech: Speech normal.         Behavior: Behavior normal. Behavior is cooperative.         Cognition and Memory: Cognition normal.           LABS  WBC   Date Value Ref Range Status   02/21/2023 7.4 3.4 - 10.8 x10E3/uL Final     RBC   Date Value Ref Range Status   02/21/2023 4.89 3.77 - 5.28 x10E6/uL Final     Hemoglobin   Date Value Ref Range Status   02/21/2023 14.8 11.1 - 15.9 g/dL Final   03/23/2022 13.3 12.0 - 15.9 g/dL Final     Hematocrit   Date Value Ref Range Status   02/21/2023 43.6 34.0 - 46.6 % Final   03/23/2022 40.4 34.0 - 46.6 % Final     MCV   Date Value Ref Range " Status   02/21/2023 89 79 - 97 fL Final   03/23/2022 84.0 79.0 - 97.0 fL Final     MCH   Date Value Ref Range Status   02/21/2023 30.3 26.6 - 33.0 pg Final   03/23/2022 27.7 26.6 - 33.0 pg Final     MCHC   Date Value Ref Range Status   02/21/2023 33.9 31.5 - 35.7 g/dL Final   03/23/2022 32.9 31.5 - 35.7 g/dL Final     RDW   Date Value Ref Range Status   02/21/2023 12.9 11.7 - 15.4 % Final   03/23/2022 13.9 12.3 - 15.4 % Final     RDW-SD   Date Value Ref Range Status   03/23/2022 42.5 37.0 - 54.0 fl Final     MPV   Date Value Ref Range Status   03/23/2022 9.0 6.0 - 12.0 fL Final     Platelets   Date Value Ref Range Status   02/21/2023 323 150 - 450 x10E3/uL Final   03/23/2022 302 140 - 450 10*3/mm3 Final     Neutrophil Rel %   Date Value Ref Range Status   02/21/2023 61 Not Estab. % Final     Neutrophil %   Date Value Ref Range Status   03/23/2022 65.4 42.7 - 76.0 % Final     Lymphocyte Rel %   Date Value Ref Range Status   02/21/2023 29 Not Estab. % Final     Lymphocyte %   Date Value Ref Range Status   03/23/2022 23.8 19.6 - 45.3 % Final     Monocyte Rel %   Date Value Ref Range Status   02/21/2023 9 Not Estab. % Final     Monocyte %   Date Value Ref Range Status   03/23/2022 8.8 5.0 - 12.0 % Final     Eosinophil Rel %   Date Value Ref Range Status   02/21/2023 1 Not Estab. % Final     Eosinophil %   Date Value Ref Range Status   03/23/2022 1.5 0.3 - 6.2 % Final     Basophil Rel %   Date Value Ref Range Status   02/21/2023 0 Not Estab. % Final     Basophil %   Date Value Ref Range Status   03/23/2022 0.3 0.0 - 1.5 % Final     Immature Grans %   Date Value Ref Range Status   03/23/2022 0.2 0.0 - 0.5 % Final     Neutrophils Absolute   Date Value Ref Range Status   02/21/2023 4.5 1.4 - 7.0 x10E3/uL Final     Neutrophils, Absolute   Date Value Ref Range Status   03/23/2022 4.29 1.70 - 7.00 10*3/mm3 Final     Lymphocytes Absolute   Date Value Ref Range Status   02/21/2023 2.2 0.7 - 3.1 x10E3/uL Final     Lymphocytes,  Absolute   Date Value Ref Range Status   03/23/2022 1.56 0.70 - 3.10 10*3/mm3 Final     Monocytes Absolute   Date Value Ref Range Status   02/21/2023 0.7 0.1 - 0.9 x10E3/uL Final     Monocytes, Absolute   Date Value Ref Range Status   03/23/2022 0.58 0.10 - 0.90 10*3/mm3 Final     Eosinophils Absolute   Date Value Ref Range Status   02/21/2023 0.1 0.0 - 0.4 x10E3/uL Final     Eosinophils, Absolute   Date Value Ref Range Status   03/23/2022 0.10 0.00 - 0.40 10*3/mm3 Final     Basophils Absolute   Date Value Ref Range Status   02/21/2023 0.0 0.0 - 0.2 x10E3/uL Final     Basophils, Absolute   Date Value Ref Range Status   03/23/2022 0.02 0.00 - 0.20 10*3/mm3 Final     Immature Grans, Absolute   Date Value Ref Range Status   03/23/2022 0.01 0.00 - 0.05 10*3/mm3 Final     nRBC   Date Value Ref Range Status   03/23/2022 0.0 0.0 - 0.2 /100 WBC Final         Triglycerides   Date Value Ref Range Status   02/21/2023 90 0 - 149 mg/dL Final     HDL Cholesterol   Date Value Ref Range Status   02/21/2023 36 (L) >39 mg/dL Final     LDL Chol Calc (NIH)   Date Value Ref Range Status   02/21/2023 93 0 - 99 mg/dL Final     Treadmill stress test 3/21/2023  Interpretation Summary       •  Stress Procedure  •  A stress test was performed following the [facility] protocol.  •  Exercise duration (min) 8 min Exercise duration (sec) 15 sec Estimated workload 10.1 METS  •  Baseline HR 90 bpm Baseline /73 mmHg ,Peak  bpm Peak /48 mmHg , Recovery  bpm Recovery /46 mmHg Exercise Data Target HR (85%) 168 bpm Max. Pred. HR (100%) 198 bpm Percent Max Pred HR 91.41 %  •  Blood pressure demonstrated a hypertensive response to stress.  •  Patient denied any chest discomfort during exercise  •  No ECG evidence of myocardial ischemia.  •  Findings consistent with a normal ECG stress test        Echocardiogram 3/21/2023  Interpretation Summary       •  Normal left ventricular cavity size, wall thickness and wall motion noted.  •   Left ventricular systolic function is normal.  LV ejection fraction is around 60%.  •  Left ventricular diastolic function was normal.  •  No significant valvular heart disease noted.  •  No pericardial effusion detected.           Assessment & Plan   Diagnoses and all orders for this visit:    1. Preoperative cardiovascular examination (Primary)  Discussed and reviewed stress test and echocardiogram  She is given surgical clearance with moderate surgical risk    2. Primary hypertension  Improved control with recent recommendations to increase lisinopril to 10 mg, continue current medications, sodium restrictions, routine monitoring.  We discussed pregnancy category of lisinopril and if she should decide to become pregnant we discussed the importance of changing medications prior to pregnancy.  She expresses understanding    3. Obesity (BMI 30-39.9)  Lifestyle modifications including heart healthy diet, regular exercise, maintenance of desirable body weight and avoidance of tobacco products.          Follow-up as needed

## 2023-04-11 NOTE — LETTER
2023     MONA Norman  [address] [place] [address]    Patient: Liliam Crooks   YOB: 2000   Date of Visit: 2023       Dear MONA Norman    Liliam Crooks was in my office today. Below is a copy of my note.    If you have questions, please do not hesitate to call me. I look forward to following Liliam along with you.         Sincerely,        MONA Green        CC: Leandra Shaffer PA-C    Subjective     Liliam Crooks is a 22 y.o. female.   Chief Complaint   Patient presents with   • Results     Follow up on stress test results      History of Present Illness   Liliam Crooks is a 22-year-old female who presents to clinic today for cardiology follow-up.    She was initially seen for preoperative evaluation for gastric procedure.  She has underwent testing and is here today to discuss the results.  Denies chest pain, dyspnea or palpitations.    Hypertension with recent recommendations to increase her lisinopril.  She reports continuing on 10 mg daily.  She denies medication side effects and reports compliance.  Home monitoring of BP is controlled and improved.  She denies lower extremity swelling.  She has IUD with no immediate planes of pregnancy.    Patient Active Problem List   Diagnosis   • Weakness   • Influenza A   • Postpartum care following  delivery   • Gallstones   • Heartburn   • Primary hypertension     Past Medical History:   Diagnosis Date   • Gallstones     s/p gui   • Heartburn     no prior EGD, no prior h pylori, tums prn   • History of bronchitis    • History of ear infections    • Primary hypertension 2023     Past Surgical History:   Procedure Laterality Date   • ADENOIDECTOMY     •  SECTION Bilateral 2022    Procedure:  SECTION PRIMARY;  Surgeon: Nicki Mathews DO;  Location: [facility] LABOR DELIVERY;  Service: Obstetrics/Gynecology;  Laterality: Bilateral;   • CHOLECYSTECTOMY N/A  03/25/2022    Procedure: CHOLECYSTECTOMY LAPAROSCOPIC WITH DAVINCI ROBOT;  Surgeon: Cristino Welch MD;  Location: [facility];  Service: Robotics - DaVinci;  Laterality: N/A;   • EAR TUBES         Family History   Problem Relation Age of Onset   • No Known Problems Mother    • Hypertension Father    • Obesity Father    • Diabetes Maternal Aunt    • Diabetes Paternal Aunt    • Hypertension Maternal Grandmother    • Heart attack Maternal Grandmother 68   • Cancer Paternal Grandmother    • Hypertension Paternal Grandmother    • Hypertension Paternal Grandfather      Social History     Tobacco Use   • Smoking status: Never   • Smokeless tobacco: Never   Vaping Use   • Vaping Use: Former   • Quit date: 12/12/2021   • Substances: Nicotine, Flavoring   • Devices: Disposable, Pre-filled or refillable cartridge   Substance Use Topics   • Alcohol use: Yes     Comment: social-  3 drinks twice monthly   • Drug use: No         The following portions of the patient's history were reviewed and updated as appropriate: allergies, current medications, past family history, past medical history, past social history, past surgical history and problem list.    No Known Allergies      Current Outpatient Medications:   •  acetaminophen (TYLENOL) 325 MG tablet, Take 2 tablets by mouth Every 4 (Four) Hours As Needed for Mild Pain ., Disp: 30 tablet, Rfl: 0  •  ibuprofen (ADVIL,MOTRIN) 600 MG tablet, Take 1 tablet by mouth Every 6 (Six) Hours As Needed for Mild Pain ., Disp: 30 tablet, Rfl: 0  •  lisinopril (PRINIVIL,ZESTRIL) 5 MG tablet, Take 2 tablets by mouth Daily., Disp: , Rfl:     Review of Systems   Constitutional: Negative for activity change, appetite change, chills, diaphoresis, fatigue and fever.   HENT: Negative for congestion, drooling, ear discharge, ear pain, mouth sores, nosebleeds, postnasal drip, rhinorrhea, sinus pressure, sneezing and sore throat.    Eyes: Negative for pain, discharge and visual disturbance.  "  Respiratory: Negative for cough, chest tightness, shortness of breath and wheezing.    Cardiovascular: Negative for chest pain, palpitations and leg swelling.   Gastrointestinal: Negative for abdominal pain, constipation, diarrhea, nausea and vomiting.   Endocrine: Negative for cold intolerance, heat intolerance, polydipsia, polyphagia and polyuria.   Musculoskeletal: Negative for arthralgias, myalgias and neck pain.   Skin: Negative for rash and wound.   Neurological: Negative for dizziness, syncope, speech difficulty, weakness, light-headedness and headaches.   Hematological: Negative for adenopathy. Does not bruise/bleed easily.   Psychiatric/Behavioral: Negative for confusion, dysphoric mood and sleep disturbance. The patient is not nervous/anxious.    All other systems reviewed and are negative.    /81 (BP Location: Right arm, Patient Position: Sitting, Cuff Size: Adult)   Pulse 84   Ht 170.2 cm (67\")   Wt 111 kg (244 lb 12.8 oz)   SpO2 99%   BMI 38.34 kg/m²     Objective   No Known Allergies    Physical Exam  Vitals reviewed.   Constitutional:       Appearance: Normal appearance. She is well-developed. She is obese.   HENT:      Head: Normocephalic.   Eyes:      Conjunctiva/sclera: Conjunctivae normal.   Neck:      Vascular: No carotid bruit or JVD.   Cardiovascular:      Rate and Rhythm: Normal rate and regular rhythm.   Pulmonary:      Effort: Pulmonary effort is normal.      Breath sounds: Normal breath sounds.   Musculoskeletal:      Cervical back: Neck supple.      Right lower leg: No edema.      Left lower leg: No edema.   Skin:     General: Skin is warm and dry.   Neurological:      Mental Status: She is alert and oriented to person, place, and time.   Psychiatric:         Attention and Perception: Attention normal.         Mood and Affect: Mood normal.         Speech: Speech normal.         Behavior: Behavior normal. Behavior is cooperative.         Cognition and Memory: Cognition normal. "           LABS  WBC   Date Value Ref Range Status   02/21/2023 7.4 3.4 - 10.8 x10E3/uL Final     RBC   Date Value Ref Range Status   02/21/2023 4.89 3.77 - 5.28 x10E6/uL Final     Hemoglobin   Date Value Ref Range Status   02/21/2023 14.8 11.1 - 15.9 g/dL Final   03/23/2022 13.3 12.0 - 15.9 g/dL Final     Hematocrit   Date Value Ref Range Status   02/21/2023 43.6 34.0 - 46.6 % Final   03/23/2022 40.4 34.0 - 46.6 % Final     MCV   Date Value Ref Range Status   02/21/2023 89 79 - 97 fL Final   03/23/2022 84.0 79.0 - 97.0 fL Final     MCH   Date Value Ref Range Status   02/21/2023 30.3 26.6 - 33.0 pg Final   03/23/2022 27.7 26.6 - 33.0 pg Final     MCHC   Date Value Ref Range Status   02/21/2023 33.9 31.5 - 35.7 g/dL Final   03/23/2022 32.9 31.5 - 35.7 g/dL Final     RDW   Date Value Ref Range Status   02/21/2023 12.9 11.7 - 15.4 % Final   03/23/2022 13.9 12.3 - 15.4 % Final     RDW-SD   Date Value Ref Range Status   03/23/2022 42.5 37.0 - 54.0 fl Final     MPV   Date Value Ref Range Status   03/23/2022 9.0 6.0 - 12.0 fL Final     Platelets   Date Value Ref Range Status   02/21/2023 323 150 - 450 x10E3/uL Final   03/23/2022 302 140 - 450 10*3/mm3 Final     Neutrophil Rel %   Date Value Ref Range Status   02/21/2023 61 Not Estab. % Final     Neutrophil %   Date Value Ref Range Status   03/23/2022 65.4 42.7 - 76.0 % Final     Lymphocyte Rel %   Date Value Ref Range Status   02/21/2023 29 Not Estab. % Final     Lymphocyte %   Date Value Ref Range Status   03/23/2022 23.8 19.6 - 45.3 % Final     Monocyte Rel %   Date Value Ref Range Status   02/21/2023 9 Not Estab. % Final     Monocyte %   Date Value Ref Range Status   03/23/2022 8.8 5.0 - 12.0 % Final     Eosinophil Rel %   Date Value Ref Range Status   02/21/2023 1 Not Estab. % Final     Eosinophil %   Date Value Ref Range Status   03/23/2022 1.5 0.3 - 6.2 % Final     Basophil Rel %   Date Value Ref Range Status   02/21/2023 0 Not Estab. % Final     Basophil %   Date  Value Ref Range Status   03/23/2022 0.3 0.0 - 1.5 % Final     Immature Grans %   Date Value Ref Range Status   03/23/2022 0.2 0.0 - 0.5 % Final     Neutrophils Absolute   Date Value Ref Range Status   02/21/2023 4.5 1.4 - 7.0 x10E3/uL Final     Neutrophils, Absolute   Date Value Ref Range Status   03/23/2022 4.29 1.70 - 7.00 10*3/mm3 Final     Lymphocytes Absolute   Date Value Ref Range Status   02/21/2023 2.2 0.7 - 3.1 x10E3/uL Final     Lymphocytes, Absolute   Date Value Ref Range Status   03/23/2022 1.56 0.70 - 3.10 10*3/mm3 Final     Monocytes Absolute   Date Value Ref Range Status   02/21/2023 0.7 0.1 - 0.9 x10E3/uL Final     Monocytes, Absolute   Date Value Ref Range Status   03/23/2022 0.58 0.10 - 0.90 10*3/mm3 Final     Eosinophils Absolute   Date Value Ref Range Status   02/21/2023 0.1 0.0 - 0.4 x10E3/uL Final     Eosinophils, Absolute   Date Value Ref Range Status   03/23/2022 0.10 0.00 - 0.40 10*3/mm3 Final     Basophils Absolute   Date Value Ref Range Status   02/21/2023 0.0 0.0 - 0.2 x10E3/uL Final     Basophils, Absolute   Date Value Ref Range Status   03/23/2022 0.02 0.00 - 0.20 10*3/mm3 Final     Immature Grans, Absolute   Date Value Ref Range Status   03/23/2022 0.01 0.00 - 0.05 10*3/mm3 Final     nRBC   Date Value Ref Range Status   03/23/2022 0.0 0.0 - 0.2 /100 WBC Final         Triglycerides   Date Value Ref Range Status   02/21/2023 90 0 - 149 mg/dL Final     HDL Cholesterol   Date Value Ref Range Status   02/21/2023 36 (L) >39 mg/dL Final     LDL Chol Calc (NIH)   Date Value Ref Range Status   02/21/2023 93 0 - 99 mg/dL Final     Treadmill stress test 3/21/2023  Interpretation Summary       •  Stress Procedure  •  A stress test was performed following the [facility] protocol.  •  Exercise duration (min) 8 min Exercise duration (sec) 15 sec Estimated workload 10.1 METS  •  Baseline HR 90 bpm Baseline /73 mmHg ,Peak  bpm Peak /48 mmHg , Recovery  bpm Recovery /46 mmHg  Exercise Data Target HR (85%) 168 bpm Max. Pred. HR (100%) 198 bpm Percent Max Pred HR 91.41 %  •  Blood pressure demonstrated a hypertensive response to stress.  •  Patient denied any chest discomfort during exercise  •  No ECG evidence of myocardial ischemia.  •  Findings consistent with a normal ECG stress test        Echocardiogram 3/21/2023  Interpretation Summary       •  Normal left ventricular cavity size, wall thickness and wall motion noted.  •  Left ventricular systolic function is normal.  LV ejection fraction is around 60%.  •  Left ventricular diastolic function was normal.  •  No significant valvular heart disease noted.  •  No pericardial effusion detected.           Assessment & Plan   Diagnoses and all orders for this visit:    1. Preoperative cardiovascular examination (Primary)  Discussed and reviewed stress test and echocardiogram  She is given surgical clearance with moderate surgical risk    2. Primary hypertension  Improved control with recent recommendations to increase lisinopril to 10 mg, continue current medications, sodium restrictions, routine monitoring.  We discussed pregnancy category of lisinopril and if she should decide to become pregnant we discussed the importance of changing medications prior to pregnancy.  She expresses understanding    3. Obesity (BMI 30-39.9)  Lifestyle modifications including heart healthy diet, regular exercise, maintenance of desirable body weight and avoidance of tobacco products.          Follow-up as needed

## 2023-05-26 ENCOUNTER — HOSPITAL ENCOUNTER (OUTPATIENT)
Dept: PULMONOLOGY | Facility: HOSPITAL | Age: 23
Discharge: HOME | End: 2023-05-26
Payer: COMMERCIAL

## 2023-05-26 DIAGNOSIS — R06.00 DYSPNEA, UNSPECIFIED TYPE: ICD-10-CM

## 2023-05-26 PROCEDURE — 94010 BREATHING CAPACITY TEST: CPT

## 2023-05-26 PROCEDURE — 94010 BREATHING CAPACITY TEST: CPT | Performed by: INTERNAL MEDICINE

## 2023-10-10 ENCOUNTER — OFFICE VISIT (OUTPATIENT)
Dept: FAMILY MEDICINE CLINIC | Age: 23
End: 2023-10-10
Payer: COMMERCIAL

## 2023-10-10 VITALS
HEART RATE: 72 BPM | TEMPERATURE: 97.1 F | OXYGEN SATURATION: 99 % | DIASTOLIC BLOOD PRESSURE: 89 MMHG | SYSTOLIC BLOOD PRESSURE: 145 MMHG

## 2023-10-10 DIAGNOSIS — E66.01 CLASS 2 SEVERE OBESITY WITH SERIOUS COMORBIDITY AND BODY MASS INDEX (BMI) OF 38.0 TO 38.9 IN ADULT, UNSPECIFIED OBESITY TYPE: Primary | ICD-10-CM

## 2023-10-10 NOTE — PROGRESS NOTES
Chief Complaint  Weight Gain     Subjective            Weight Loss      Liliam Crooks is a 23 y.o. female who presents to [facility] today with interest in the weight loss program with a BMI of There is no height or weight on file to calculate BMI.. Patient is a candidate for this program due to BMI of Obese Class II: 35-39.9kg/m2. Patient denies a personal and family history of pancreatitis and medullary thyroid cancer. Patient also denies a personal history of gastroparesis or gallbladder disease. Previous diet and exercise plans attempted include trying Weight Watchers, Keto diet and Adipex. Pt has lost approx 15 lbs with these methods over 6months to one year. However she could not tolerate the Adipex. In the past 5 years she has gained approximately 70 lbs. Pt also tried going the the gym a few times a week. She has one child and has had difficulty losing her postpartum weight in the past year. Her blood pressure is slightly elevated however she has not been taking her Lisinopril. Pt uses an IUD for birth control and has a history of amenorrhea.       Past Medical History:   Diagnosis Date    Gallstones     s/p gui    Heartburn     no prior EGD, no prior h pylori, tums prn    History of bronchitis     History of ear infections     Primary hypertension 2023     Past Surgical History:   Procedure Laterality Date    ADENOIDECTOMY  2009     SECTION Bilateral 2022    Procedure:  SECTION PRIMARY;  Surgeon: Nicki Mathews DO;  Location: [facility] LABOR DELIVERY;  Service: Obstetrics/Gynecology;  Laterality: Bilateral;    CHOLECYSTECTOMY N/A 2022    Procedure: CHOLECYSTECTOMY LAPAROSCOPIC WITH DAVINCI ROBOT;  Surgeon: Cristino Welch MD;  Location: [facility] OR;  Service: Robotics - DaVinci;  Laterality: N/A;    EAR TUBES        Family History   Problem Relation Age of Onset    No Known Problems Mother     Hypertension Father     Obesity  "Father     Diabetes Maternal Aunt     Diabetes Paternal Aunt     Hypertension Maternal Grandmother     Heart attack Maternal Grandmother 68    Cancer Paternal Grandmother     Hypertension Paternal Grandmother     Hypertension Paternal Grandfather       Social History     Socioeconomic History    Marital status: Single   Tobacco Use    Smoking status: Never    Smokeless tobacco: Never   Vaping Use    Vaping Use: Former    Quit date: 12/12/2021    Substances: Nicotine, Flavoring    Devices: Disposable, Pre-filled or refillable cartridge   Substance and Sexual Activity    Alcohol use: Yes     Comment: social-  3 drinks twice monthly    Drug use: No    Sexual activity: Defer     Birth control/protection: I.U.D.      No Known Allergies   Current Outpatient Medications on File Prior to Visit   Medication Sig Dispense Refill    acetaminophen (TYLENOL) 325 MG tablet Take 2 tablets by mouth Every 4 (Four) Hours As Needed for Mild Pain . 30 tablet 0    ibuprofen (ADVIL,MOTRIN) 600 MG tablet Take 1 tablet by mouth Every 6 (Six) Hours As Needed for Mild Pain . 30 tablet 0    lisinopril (PRINIVIL,ZESTRIL) 5 MG tablet Take 2 tablets by mouth Daily.       No current facility-administered medications on file prior to visit.        The following portions of the patient's history were reviewed and updated as appropriate: allergies, current medications, past family history, past social history, past medical history, past surgical history and problem list.     Objective   Vital Signs:  Vitals:    10/10/23 1222   BP: 145/89   BP Location: Left arm   Patient Position: Sitting   Cuff Size: Adult   Pulse: 72   Temp: 97.1 øF (36.2 øC)   SpO2: 99%      Estimated body mass index is 38.34 kg/mý as calculated from the following:    Height as of 4/11/23: 170.2 cm (67\").    Weight as of 4/11/23: 111 kg (244 lb 12.8 oz).               Physical Exam  Constitutional:       Appearance: She is obese.   HENT:      Head: Normocephalic.      Nose: Nose " normal.   Eyes:      Pupils: Pupils are equal, round, and reactive to light.   Neck:      Comments: No goiter, cysts or masses  Cardiovascular:      Rate and Rhythm: Normal rate and regular rhythm.   Pulmonary:      Effort: Pulmonary effort is normal.      Breath sounds: Normal breath sounds.   Abdominal:      General: Bowel sounds are normal.      Palpations: Abdomen is soft.   Musculoskeletal:         General: Normal range of motion.      Cervical back: Neck supple.   Skin:     General: Skin is warm and dry.   Neurological:      General: No focal deficit present.      Mental Status: She is alert.   Psychiatric:         Mood and Affect: Mood normal.         Thought Content: Thought content normal.          No visits with results within 3 Month(s) from this visit.   Latest known visit with results is:   Hospital Outpatient Visit on 03/21/2023   Component Date Value Ref Range Status    Target HR (85%) 03/21/2023 168  bpm Final    Max. Pred. HR (100%) 03/21/2023 198  bpm Final    BH CV STRESS PROTOCOL 1 03/21/2023 Tommie   Final    Stage 1 03/21/2023 1   Final    HR Stage 1 03/21/2023 142   Final    BP Stage 1 03/21/2023 179/52   Final    Duration Min Stage 1 03/21/2023 3   Final    Duration Sec Stage 1 03/21/2023 0   Final    Grade Stage 1 03/21/2023 10   Final    Speed Stage 1 03/21/2023 1.7   Final    BH CV STRESS METS STAGE 1 03/21/2023 5   Final    Stage 2 03/21/2023 2   Final    HR Stage 2 03/21/2023 166   Final    BP Stage 2 03/21/2023 191/48   Final    Duration Min Stage 2 03/21/2023 3   Final    Duration Sec Stage 2 03/21/2023 0   Final    Grade Stage 2 03/21/2023 12   Final    Speed Stage 2 03/21/2023 2.5   Final    BH CV STRESS METS STAGE 2 03/21/2023 7.5   Final    Stage 3 03/21/2023 3   Final    HR Stage 3 03/21/2023 181   Final    Duration Min Stage 3 03/21/2023 2   Final    Duration Sec Stage 3 03/21/2023 15   Final    Grade Stage 3 03/21/2023 14   Final    Speed Stage 3 03/21/2023 3.4   Final    BH CV  STRESS METS STAGE 3 03/21/2023 10.0   Final    Baseline HR 03/21/2023 90  bpm Final    Baseline BP 03/21/2023 140/73  mmHg Final    Peak HR 03/21/2023 181  bpm Final    Percent Max Pred HR 03/21/2023 91.41  % Final    Percent Target HR 03/21/2023 108  % Final    Peak BP 03/21/2023 191/48  mmHg Final    Exercise duration (min) 03/21/2023 8  min Final    Exercise duration (sec) 03/21/2023 15  sec Final    Estimated workload 03/21/2023 10.1  METS Final    Recovery BP 03/21/2023 141/46  mmHg Final    Recovery HR 03/21/2023 110  bpm Final        Result Review :           Assessment and Plan     Diagnoses and all orders for this visit:    1. Class 2 severe obesity with serious comorbidity and body mass index (BMI) of 38.0 to 38.9 in adult, unspecified obesity type (Primary)  -     CBC (No Diff); Future  -     Comprehensive Metabolic Panel; Future  -     C-reactive Protein; Future  -     Folate; Future  -     Hemoglobin A1c; Future  -     Lipid Panel With / Chol / HDL Ratio; Future  -     T3; Future  -     T4, Free; Future  -     TSH; Future  -     Vitamin B12; Future  -     Vitamin D,25-Hydroxy; Future  -     Pregnancy, Urine - Urine, Clean Catch; Future           ICD-10-CM ICD-9-CM   1. Class 2 severe obesity with serious comorbidity and body mass index (BMI) of 38.0 to 38.9 in adult, unspecified obesity type  E66.01 278.01    Z68.38 V85.38                Patient Education:     Patient is a candidate for the program with a BMI of There is no height or weight on file to calculate BMI.. Obesity related health conditions include: BMI is above average. BMI management plan is completed. We reviewed portion control, increasing exercise and pharmacologic options. Details regarding the process of the program discussed with patient. Patient voiced understanding. Outpatient labs ordered for baseline  Patient had no further questions or concerns.     I explained that obesity and an elevated BMI can be a disease of body weight  dysregulation influenced by factors including environment, genetics and hormones and intiated the discussion of semaglutides in appetite dysregulation and metabolic adaptation. We discussed possible side effects and complications of GLP1s including but not limited to nausea, vomiting, abdominal pain, bloating, constipation, diarrhea, slowed digestion, heartburn and gallstones or gallbladder disease. Reviewed that side effects are mild to moderate and dose dependent and typically subside in 4-6 weeks. Will await lab results and if appropriate, refer to [facility] pharmacists.      Follow Up   Return in about 3 months (around 1/10/2024) for Recheck.  Patient was given instructions and counseling regarding her condition or for health maintenance advice. Please see specific information pulled into the AVS if appropriate.         This document has been electronically signed by MONA Llanos  October 10, 2023 12:45 EDT

## 2023-10-11 ENCOUNTER — LAB (OUTPATIENT)
Dept: LAB | Facility: HOSPITAL | Age: 23
End: 2023-10-11
Payer: COMMERCIAL

## 2023-10-11 DIAGNOSIS — E66.01 CLASS 2 SEVERE OBESITY WITH SERIOUS COMORBIDITY AND BODY MASS INDEX (BMI) OF 38.0 TO 38.9 IN ADULT, UNSPECIFIED OBESITY TYPE: ICD-10-CM

## 2023-10-11 LAB
25(OH)D3 SERPL-MCNC: 23.4 NG/ML (ref 30–100)
ALBUMIN SERPL-MCNC: 4.3 G/DL (ref 3.5–5.2)
ALBUMIN/GLOB SERPL: 1.5 G/DL
ALP SERPL-CCNC: 89 U/L (ref 39–117)
ALT SERPL W P-5'-P-CCNC: 49 U/L (ref 1–33)
ANION GAP SERPL CALCULATED.3IONS-SCNC: 9.5 MMOL/L (ref 5–15)
AST SERPL-CCNC: 22 U/L (ref 1–32)
B-HCG UR QL: NEGATIVE
BILIRUB SERPL-MCNC: 0.4 MG/DL (ref 0–1.2)
BUN SERPL-MCNC: 14 MG/DL (ref 6–20)
BUN/CREAT SERPL: 14.9 (ref 7–25)
CALCIUM SPEC-SCNC: 9.2 MG/DL (ref 8.6–10.5)
CHLORIDE SERPL-SCNC: 103 MMOL/L (ref 98–107)
CHOLEST SERPL-MCNC: 153 MG/DL (ref 0–200)
CO2 SERPL-SCNC: 25.5 MMOL/L (ref 22–29)
CREAT SERPL-MCNC: 0.94 MG/DL (ref 0.57–1)
CRP SERPL-MCNC: <0.3 MG/DL (ref 0–0.5)
DEPRECATED RDW RBC AUTO: 40.4 FL (ref 37–54)
EGFRCR SERPLBLD CKD-EPI 2021: 87.6 ML/MIN/1.73
ERYTHROCYTE [DISTWIDTH] IN BLOOD BY AUTOMATED COUNT: 12.5 % (ref 12.3–15.4)
FOLATE SERPL-MCNC: 5.57 NG/ML (ref 4.78–24.2)
GLOBULIN UR ELPH-MCNC: 2.8 GM/DL
GLUCOSE SERPL-MCNC: 73 MG/DL (ref 65–99)
HBA1C MFR BLD: 5.1 % (ref 4.8–5.6)
HCT VFR BLD AUTO: 43.5 % (ref 34–46.6)
HDLC SERPL QL: 4.03
HDLC SERPL-MCNC: 38 MG/DL (ref 40–60)
HGB BLD-MCNC: 15 G/DL (ref 12–15.9)
LDLC SERPL CALC-MCNC: 90 MG/DL (ref 0–100)
MCH RBC QN AUTO: 30.9 PG (ref 26.6–33)
MCHC RBC AUTO-ENTMCNC: 34.5 G/DL (ref 31.5–35.7)
MCV RBC AUTO: 89.5 FL (ref 79–97)
PLATELET # BLD AUTO: 240 10*3/MM3 (ref 140–450)
PMV BLD AUTO: 9.3 FL (ref 6–12)
POTASSIUM SERPL-SCNC: 4.2 MMOL/L (ref 3.5–5.2)
PROT SERPL-MCNC: 7.1 G/DL (ref 6–8.5)
RBC # BLD AUTO: 4.86 10*6/MM3 (ref 3.77–5.28)
SODIUM SERPL-SCNC: 138 MMOL/L (ref 136–145)
T3 SERPL-MCNC: 114 NG/DL (ref 80–200)
T4 FREE SERPL-MCNC: 1.27 NG/DL (ref 0.93–1.7)
TRIGL SERPL-MCNC: 142 MG/DL (ref 0–150)
TSH SERPL DL<=0.05 MIU/L-ACNC: 1.27 UIU/ML (ref 0.27–4.2)
VIT B12 BLD-MCNC: 256 PG/ML (ref 211–946)
VLDLC SERPL-MCNC: 25 MG/DL (ref 5–40)
WBC NRBC COR # BLD: 5.41 10*3/MM3 (ref 3.4–10.8)

## 2023-10-11 PROCEDURE — 82306 VITAMIN D 25 HYDROXY: CPT

## 2023-10-11 PROCEDURE — 81025 URINE PREGNANCY TEST: CPT

## 2023-10-11 PROCEDURE — 84480 ASSAY TRIIODOTHYRONINE (T3): CPT

## 2023-10-11 PROCEDURE — 83036 HEMOGLOBIN GLYCOSYLATED A1C: CPT

## 2023-10-11 PROCEDURE — 36415 COLL VENOUS BLD VENIPUNCTURE: CPT

## 2023-10-11 PROCEDURE — 82607 VITAMIN B-12: CPT

## 2023-10-11 PROCEDURE — 86140 C-REACTIVE PROTEIN: CPT

## 2023-10-11 PROCEDURE — 80050 GENERAL HEALTH PANEL: CPT

## 2023-10-11 PROCEDURE — 84439 ASSAY OF FREE THYROXINE: CPT

## 2023-10-11 PROCEDURE — 82746 ASSAY OF FOLIC ACID SERUM: CPT

## 2023-10-11 PROCEDURE — 80061 LIPID PANEL: CPT

## 2023-10-12 DIAGNOSIS — E66.01 CLASS 2 SEVERE OBESITY WITH SERIOUS COMORBIDITY AND BODY MASS INDEX (BMI) OF 38.0 TO 38.9 IN ADULT, UNSPECIFIED OBESITY TYPE: ICD-10-CM

## 2023-10-12 DIAGNOSIS — E55.9 VITAMIN D DEFICIENCY: Primary | ICD-10-CM

## 2023-10-12 RX ORDER — ERGOCALCIFEROL 1.25 MG/1
50000 CAPSULE ORAL WEEKLY
Qty: 4 CAPSULE | Refills: 2 | Status: SHIPPED | OUTPATIENT
Start: 2023-10-12

## 2023-10-24 ENCOUNTER — DISEASE STATE MANAGEMENT VISIT (OUTPATIENT)
Dept: PHARMACY | Facility: HOSPITAL | Age: 23
End: 2023-10-24
Payer: COMMERCIAL

## 2023-10-24 VITALS
HEART RATE: 70 BPM | HEIGHT: 67 IN | DIASTOLIC BLOOD PRESSURE: 71 MMHG | SYSTOLIC BLOOD PRESSURE: 146 MMHG | WEIGHT: 250.2 LBS | BODY MASS INDEX: 39.27 KG/M2

## 2023-10-24 RX ORDER — SEMAGLUTIDE 0.25 MG/.5ML
0.25 INJECTION, SOLUTION SUBCUTANEOUS WEEKLY
Qty: 2 ML | Refills: 0 | Status: SHIPPED | OUTPATIENT
Start: 2023-10-24

## 2023-10-24 NOTE — PROGRESS NOTES
[facility]  Weight Management Program      Liliam Crooks is a 23 y.o. female referred to the [facility] by Adore Jones for clinical pharmacy and specialty pharmacy management of GLP1 for weight management.  The patient denies a personal history or family history of thyroid cancer and reports/denies a personal history of pancreatitis.     Liliam Crooks has previously tried Adipex, weight watchers, keto, and lifestyle changes for weight loss. Current weight loss efforts include monitoring intake.     Relevant Past Medical History and Co-morbidities  Past Medical History:   Diagnosis Date    Gallstones     s/p gui    Heartburn     no prior EGD, no prior h pylori, tums prn    History of bronchitis     History of ear infections     Primary hypertension 2/21/2023     Social History     Socioeconomic History    Marital status: Single   Tobacco Use    Smoking status: Never    Smokeless tobacco: Never   Vaping Use    Vaping Use: Former    Quit date: 12/12/2021    Substances: Nicotine, Flavoring    Devices: Disposable, Pre-filled or refillable cartridge   Substance and Sexual Activity    Alcohol use: Yes     Comment: social-  3 drinks twice monthly    Drug use: No    Sexual activity: Defer     Birth control/protection: I.U.D.       Allergies  Patient has no known allergies.    Current Medication List    Current Outpatient Medications:     acetaminophen (TYLENOL) 325 MG tablet, Take 2 tablets by mouth Every 4 (Four) Hours As Needed for Mild Pain ., Disp: 30 tablet, Rfl: 0    amoxicillin (AMOXIL) 875 MG tablet, Take 1 tablet by mouth 2 times a day for 7 days, Disp: 14 tablet, Rfl: 0    ibuprofen (ADVIL,MOTRIN) 600 MG tablet, Take 1 tablet by mouth Every 6 (Six) Hours As Needed for Mild Pain ., Disp: 30 tablet, Rfl: 0    lisinopril (PRINIVIL,ZESTRIL) 5 MG tablet, Take 2 tablets by mouth Daily., Disp: , Rfl:     oseltamivir (Tamiflu) 75 MG capsule, Take 1 capsule by mouth every 12  "hours for 5 days, Disp: 10 capsule, Rfl: 0    vitamin D (ERGOCALCIFEROL) 1.25 MG (94245 UT) capsule capsule, Take 1 capsule by mouth 1 (One) Time Per Week., Disp: 4 capsule, Rfl: 2  Medications that contribute to weight gain or \"None\" : None  Drug Interactions  Wegovy+ Lisinopril: Lisinopril may enhance hypoglcyemic events.     Relevant Laboratory Values  Lab Results   Component Value Date    CHOL 153 10/11/2023    CHLPL 146 02/21/2023    TRIG 142 10/11/2023    HDL 38 (L) 10/11/2023    LDL 90 10/11/2023     There is no height or weight on file to calculate BMI.    Vaccinations:   Patient recommended to keep up with routine vaccinations.     Goals of Therapy  Clinical Goals or Therapeutic Targets: Prevent weight associated co-morbidities and complications      Date   10/24/23       Weight (lb) 250.2 lbs       BMI kg/ 39.19       Waist Circumference (in)  48.5\"           Medication Assessment & Plan    Patient has current BMI of 39.19, which is considered Class II Obesity.    Will order Wegovy 0.25 mg SC weekly.      Liliam Crooks is a female of childbearing age.  She denies pregnancy, planning to become pregnant or breastfeeding.   If patient were to become pregnant while on medication, instructed her to stop drug immediately and inform her provider.     The following medications will need dose adjustments/closer monitoring once the GLP1 is started: none     Would recommend monitoring liver enzymes as slightly elevated. Patient reported referring provider discussed with her and that she understood that she believes she anticipates this correcting as she loses weight. Patient denied an known liver disease/impairment. Patient has had gallbladder removed.     Although risk is low, did  patient on s/sx of hypoglycemia with Wegovy + Lisinopril. Patient aware of the s/sx to monitor and the rule of 15 to correct.     Discussed lifestyle modifications, including diet and exercise.  Patient education provided. "     Worked with patient to create SMART Goal(s):   Increase water intake to 64 oz per day  To start decreasing pop intake from ~2-3 cans per day to 1 per day with goal of eliminating as she continues in the process.     Will follow-up with patient in clinic in 4 weeks.     Leandra Chong. [place], PharmD  10/24/2023  16:30 EDT

## 2023-10-24 NOTE — PROGRESS NOTES
Wegovy® (semaglutide)  Medication Expectations   Why am I taking this medication? You are taking Wegovy for weight loss because you have excess weight and also have weight-related medical problems or obesity. It should be used along with a reduced calorie diet and increased physical activity.    What should I expect while on this medication? You should expect to lose at least 5% of your body weight after 3 months of therapy. It can also help keep weight lost off.    How does the medication work? Wegovy is an injection that addresses one of your body's natural responses to weight loss. It works like a naturally produced hormone in the body called GLP-1 that regulates appetite which can lead to eating fewer calories and losing weight. This medication also slows down food from leaving your stomach, making you feel estevez for longer.   How long will I be on this medication for? The amount of time you will be on this medication will be determined by your doctor. Do not abruptly stop this medication without talking to your doctor first.    How do I take this medication? Take as directed on your prescription label. Wegovy is injected under the skin (subcutaneously) of your stomach, thigh, or upper arm. This medication should be taken once weekly and can be given with or without food. Rotate injection sites weekly. If changing the day of administration is necessary, allow at least 48 hours between 2 doses.     After removal of the pen cap, the needles will be hidden inside the needle cover.  To begin injection, press the needle cover firmly against the skin.  Once injected, continue to press the device against the skin until the yellow bar has stopped moving.  Then, remove the needle from the skin.    What are some possible side effects? You may notice you don't feel as hungry, especially when you first start using Wegovy. Some common side effects include nausea, vomiting, diarrhea, vomiting, indigestion, constipation,  tiredness, and headache. Redness, itching, and/or swelling can occur where the shot was given. You should also monitor for low blood sugar (hypoglycemia), especially if you are taking Wegovy with other medications that cause low blood sugar. Stop using Wegovy and call your doctor immediately if you have severe pain in your stomach area that will not go away as this could be a sign of pancreatitis (inflammation of your pancreas).     What happens if I miss a dose? If you miss a dose, take it as soon as you remember within 5 days. Resume usual schedule thereafter.  If > 5 days have elapsed, skip the missed dose and resume administration at the next scheduled weekly dose.      Medication Safety   What are things I should warn my doctor immediately about? Do not use Wegovy if you or a family member have ever had medullary thyroid cancer (MTC) or Multiple Endocrine Neoplasia syndrome type 2 (MEN 2). Tell your doctor if you get a lump or swelling in your neck, hoarseness, difficulty swallowing, or feel short of breath (these may be symptoms of thyroid cancer). Talk to your doctor if you have or have had problems with your pancreas, kidneys, or liver. Tell your doctor if you have problems with digesting food or slowed emptying of your stomach (gastroparesis). Talk to your doctor if you are pregnant, planning to become pregnant, or breastfeeding. Also tell your doctor if you notice any signs/symptoms of an allergic reaction (rash, hives, difficulty breathing, etc.).   What are things that I should be cautious of? Be cautious of any side effects from this medication. Talk to your doctor if any new ones develop or aren't getting better.   What are some medications that can interact with this one? Taking Wegovy with other medications that lower your blood sugar such as insulins and glipizide/glimepiride/glyburide may increase the risk of low blood sugar. It should not be taken with other medicines called GLP-1 receptor  agonists, as these work the same way as Wegovy. Because Wegovy slows stomach emptying, it can affect the way some medicines work. Always tell your doctor or pharmacist immediately if you start taking any new medications, including over-the-counter medications, vitamins, and herbal supplements.      Medication Storage/Handling   How should I handle this medication? Keep this medication out of reach of pets/children and keep the pen capped when not in use. Do not share your medicine pens with others.    How does this medication need to be stored? Store your new, unused pens in the original carton in the refrigerator. Protect it from light. Do not freeze. Prior to cap removal, the pen can be kept at room temperature up to 28 days.    How should I dispose of this medication? Used Wegovy pens should be thrown after each use. Place your used pen and needle in an approved sharps container. If you do not have a sharps container, you may use a household container made of heavy-duty plastic with a tight-fitting lid that is leak resistant (e.g., heavy-duty plastic laundry detergent bottle). If your doctor decides to stop this medication, take to your local police station for proper disposal. Some pharmacies also have take-back bins for medication drop-off.       Resources/Support   How can I remind myself to take this medication? You can download reminder apps to help you manage your refills. You may also set an alarm on your phone to remind you.    Is financial support available?  [employer] can provide co-pay cards if you have commercial insurance.    Which vaccines are recommended for me? Talk to your doctor about these vaccines: Flu, Coronavirus (COVID-19), Pneumococcal (pneumonia), Tdap, Zoster (shingles)    Wegovy® (semaglutide)  Medication Expectations   Why am I taking this medication? You are taking Wegovy for weight loss because you have excess weight and also have weight-related medical problems or obesity. It  should be used along with a reduced calorie diet and increased physical activity.    What should I expect while on this medication? You should expect to lose at least 5% of your body weight after 3 months of therapy. It can also help keep weight lost off.    How does the medication work? Wegovy is an injection that addresses one of your body's natural responses to weight loss. It works like a naturally produced hormone in the body called GLP-1 that regulates appetite which can lead to eating fewer calories and losing weight. This medication also slows down food from leaving your stomach, making you feel estevez for longer.   How long will I be on this medication for? The amount of time you will be on this medication will be determined by your doctor. Do not abruptly stop this medication without talking to your doctor first.    How do I take this medication? Take as directed on your prescription label. Wegovy is injected under the skin (subcutaneously) of your stomach, thigh, or upper arm. This medication should be taken once weekly and can be given with or without food. Rotate injection sites weekly. If changing the day of administration is necessary, allow at least 48 hours between 2 doses.     After removal of the pen cap, the needles will be hidden inside the needle cover.  To begin injection, press the needle cover firmly against the skin.  Once injected, continue to press the device against the skin until the yellow bar has stopped moving.  Then, remove the needle from the skin.    What are some possible side effects? You may notice you don't feel as hungry, especially when you first start using Wegovy. Some common side effects include nausea, vomiting, diarrhea, vomiting, indigestion, constipation, tiredness, and headache. Redness, itching, and/or swelling can occur where the shot was given. You should also monitor for low blood sugar (hypoglycemia), especially if you are taking Wegovy with other medications  that cause low blood sugar. Stop using Wegovy and call your doctor immediately if you have severe pain in your stomach area that will not go away as this could be a sign of pancreatitis (inflammation of your pancreas).     What happens if I miss a dose? If you miss a dose, take it as soon as you remember within 5 days. Resume usual schedule thereafter.  If > 5 days have elapsed, skip the missed dose and resume administration at the next scheduled weekly dose.      Medication Safety   What are things I should warn my doctor immediately about? Do not use Wegovy if you or a family member have ever had medullary thyroid cancer (MTC) or Multiple Endocrine Neoplasia syndrome type 2 (MEN 2). Tell your doctor if you get a lump or swelling in your neck, hoarseness, difficulty swallowing, or feel short of breath (these may be symptoms of thyroid cancer). Talk to your doctor if you have or have had problems with your pancreas, kidneys, or liver. Tell your doctor if you have problems with digesting food or slowed emptying of your stomach (gastroparesis). Talk to your doctor if you are pregnant, planning to become pregnant, or breastfeeding. Also tell your doctor if you notice any signs/symptoms of an allergic reaction (rash, hives, difficulty breathing, etc.).   What are things that I should be cautious of? Be cautious of any side effects from this medication. Talk to your doctor if any new ones develop or aren't getting better.   What are some medications that can interact with this one? Taking Wegovy with other medications that lower your blood sugar such as insulins and glipizide/glimepiride/glyburide may increase the risk of low blood sugar. It should not be taken with other medicines called GLP-1 receptor agonists, as these work the same way as Wegovy. Because Wegovy slows stomach emptying, it can affect the way some medicines work. Always tell your doctor or pharmacist immediately if you start taking any new medications,  including over-the-counter medications, vitamins, and herbal supplements.      Medication Storage/Handling   How should I handle this medication? Keep this medication out of reach of pets/children and keep the pen capped when not in use. Do not share your medicine pens with others.    How does this medication need to be stored? Store your new, unused pens in the original carton in the refrigerator. Protect it from light. Do not freeze. Prior to cap removal, the pen can be kept at room temperature up to 28 days.    How should I dispose of this medication? Used Wegovy pens should be thrown after each use. Place your used pen and needle in an approved sharps container. If you do not have a sharps container, you may use a household container made of heavy-duty plastic with a tight-fitting lid that is leak resistant (e.g., heavy-duty plastic laundry detergent bottle). If your doctor decides to stop this medication, take to your local police station for proper disposal. Some pharmacies also have take-back bins for medication drop-off.       Resources/Support   How can I remind myself to take this medication? You can download reminder apps to help you manage your refills. You may also set an alarm on your phone to remind you.    Is financial support available?  [employer] can provide co-pay cards if you have commercial insurance.    Which vaccines are recommended for me? Talk to your doctor about these vaccines: Flu, Coronavirus (COVID-19), Pneumococcal (pneumonia), Tdap, Zoster (shingles)

## 2023-11-16 DIAGNOSIS — N39.0 URINARY TRACT INFECTION WITHOUT HEMATURIA, SITE UNSPECIFIED: Primary | ICD-10-CM

## 2023-11-16 RX ORDER — CIPROFLOXACIN 500 MG/1
500 TABLET, FILM COATED ORAL 2 TIMES DAILY
Qty: 14 TABLET | Refills: 0 | Status: SHIPPED | OUTPATIENT
Start: 2023-11-16 | End: 2023-11-23

## 2023-11-21 ENCOUNTER — DISEASE STATE MANAGEMENT VISIT (OUTPATIENT)
Dept: PHARMACY | Facility: HOSPITAL | Age: 23
End: 2023-11-21
Payer: COMMERCIAL

## 2023-11-21 VITALS
HEIGHT: 67 IN | SYSTOLIC BLOOD PRESSURE: 128 MMHG | HEART RATE: 90 BPM | BODY MASS INDEX: 39.05 KG/M2 | DIASTOLIC BLOOD PRESSURE: 70 MMHG | WEIGHT: 248.8 LBS

## 2023-11-21 RX ORDER — SEMAGLUTIDE 0.5 MG/.5ML
0.5 INJECTION, SOLUTION SUBCUTANEOUS WEEKLY
Qty: 2 ML | Refills: 0 | Status: SHIPPED | OUTPATIENT
Start: 2023-11-21

## 2023-11-21 NOTE — PROGRESS NOTES
[facility]  Weight Management Program      Liliam Crooks is a 23 y.o. female referred to the [facility] by Adore Jones for clinical pharmacy and specialty pharmacy management of GLP1 for weight management.  The patient denies a personal history or family history of thyroid cancer and reports/denies a personal history of pancreatitis.     Liliam Crooks has previously tried Adipex, weight watchers, keto, and lifestyle changes for weight loss. Current weight loss efforts include monitoring intake.     Patient is currently on Wegovy 0.25 mg. Patient denies any lumps/swelling/hoarseness in neck/throat or severe abdominal pain. Patient denies any missed doses or trouble giving the injection. Patient reports tolerating medication well without any side effects. She does report that she has felt some appetite suppression already. Patient reports that she feels she has been doing good with her water intake. She reports that she usually drinks 2 Dev tumblers per day of water. Patient reports that she not had any pop/soda in 10 days.   Patient is ready to titrate the dose today.     Relevant Past Medical History and Co-morbidities  Past Medical History:   Diagnosis Date    Gallstones     s/p gui    Heartburn     no prior EGD, no prior h pylori, tums prn    History of bronchitis     History of ear infections     Primary hypertension 2/21/2023     Social History     Socioeconomic History    Marital status: Single   Tobacco Use    Smoking status: Never    Smokeless tobacco: Never   Vaping Use    Vaping Use: Former    Quit date: 12/12/2021    Substances: Nicotine, Flavoring    Devices: Disposable, Pre-filled or refillable cartridge   Substance and Sexual Activity    Alcohol use: Yes     Comment: social-  3 drinks twice monthly    Drug use: No    Sexual activity: Defer     Birth control/protection: I.U.D.       Allergies  Patient has no known allergies.    Current Medication  "List    Current Outpatient Medications:     ciprofloxacin (Cipro) 500 MG tablet, Take 1 tablet by mouth 2 (Two) Times a Day for 7 days., Disp: 14 tablet, Rfl: 0    lisinopril (PRINIVIL,ZESTRIL) 5 MG tablet, Take 2 tablets by mouth Daily., Disp: , Rfl:     Semaglutide-Weight Management (Wegovy) 0.25 MG/0.5ML solution auto-injector, Inject 0.25 mg under the skin into the appropriate area as directed 1 (One) Time Per Week., Disp: 2 mL, Rfl: 0    vitamin D (ERGOCALCIFEROL) 1.25 MG (32535 UT) capsule capsule, Take 1 capsule by mouth 1 (One) Time Per Week., Disp: 4 capsule, Rfl: 2  Medications that contribute to weight gain or \"None\" : None  Drug Interactions  Wegovy+ Lisinopril: Lisinopril may enhance hypoglcyemic events.     Relevant Laboratory Values  Lab Results   Component Value Date    CHOL 153 10/11/2023    CHLPL 146 02/21/2023    TRIG 142 10/11/2023    HDL 38 (L) 10/11/2023    LDL 90 10/11/2023     There is no height or weight on file to calculate BMI.    Vaccinations:   Patient recommended to keep up with routine vaccinations.     Goals of Therapy  Clinical Goals or Therapeutic Targets: Prevent weight associated co-morbidities and complications      Date   10/24/23   11/21/23      Weight (lb) 250.2 lbs 248.8 lbs      BMI kg/ 39.19 38.97      Waist Circumference (in)  48.5\" 48.75\"          Medication Assessment & Plan    Patient has current BMI of 38.97, which is considered Class II Obesity. Patient has lost a total of 1.4 lbs since starting therapy. Patient congratulated on success thus far!    Will order Wegovy 0.5 mg SC weekly.      Liliam Crooks is a female of childbearing age.  She denies pregnancy, planning to become pregnant or breastfeeding.   If patient were to become pregnant while on medication, instructed her to stop drug immediately and inform her provider.     The following medications will need dose adjustments/closer monitoring once the GLP1 is started: none     Would recommend monitoring liver " enzymes as slightly elevated. Patient reported referring provider discussed with her and that she understood that she believes she anticipates this correcting as she loses weight. Patient denied an known liver disease/impairment. Patient has had gallbladder removed.     Although risk is low, did  patient on s/sx of hypoglycemia with Wegovy + Lisinopril. Patient aware of the s/sx to monitor and the rule of 15 to correct. Pt denies any low blood sugar today.     Discussed lifestyle modifications, including diet and exercise.  Patient education provided.     Worked with patient to create SMART Goal(s):   Increase water intake to 64 oz per day  To start decreasing pop intake from ~2-3 cans per day to 1 per day with goal of eliminating as she continues in the process.     Will follow-up with patient in clinic in 4 weeks.     Leandra Chong. Roslyn, PharmD  11/21/2023  16:30 EST

## 2023-12-15 ENCOUNTER — DISEASE STATE MANAGEMENT VISIT (OUTPATIENT)
Dept: PHARMACY | Facility: HOSPITAL | Age: 23
End: 2023-12-15
Payer: COMMERCIAL

## 2023-12-15 VITALS
SYSTOLIC BLOOD PRESSURE: 135 MMHG | BODY MASS INDEX: 38.04 KG/M2 | WEIGHT: 242.4 LBS | DIASTOLIC BLOOD PRESSURE: 84 MMHG | HEIGHT: 67 IN | HEART RATE: 89 BPM

## 2023-12-15 DIAGNOSIS — E66.01 MORBID (SEVERE) OBESITY DUE TO EXCESS CALORIES: Primary | ICD-10-CM

## 2023-12-15 RX ORDER — SEMAGLUTIDE 1 MG/.5ML
1 INJECTION, SOLUTION SUBCUTANEOUS WEEKLY
Qty: 2 ML | Refills: 0 | Status: SHIPPED | OUTPATIENT
Start: 2023-12-15

## 2023-12-15 NOTE — PROGRESS NOTES
[facility]  Weight Management Program      Liliam Crooks is a 23 y.o. female referred to the [facility] by Adore Jones for clinical pharmacy and specialty pharmacy management of GLP1 for weight management.  The patient denies a personal history or family history of thyroid cancer and reports/denies a personal history of pancreatitis.     Liliam Crooks has previously tried Adipex, weight watchers, keto, and lifestyle changes for weight loss. Current weight loss efforts include monitoring intake.     Patient is currently on Wegovy 0.5 mg weekly. Patient denies any lumps/swelling/hoarseness in neck/throat or abdominal pain. Patient reports tolerating medication well without any side effects. Patient denies any trouble giving injection or any missed doses. Patient wishes to titrate dose at this time. Patient reports that she is doing well with her water intake goal and has pretty much cut out soda completely.      Relevant Past Medical History and Co-morbidities  Past Medical History:   Diagnosis Date    Gallstones     s/p gui    Heartburn     no prior EGD, no prior h pylori, tums prn    History of bronchitis     History of ear infections     Primary hypertension 2/21/2023     Social History     Socioeconomic History    Marital status: Single   Tobacco Use    Smoking status: Never    Smokeless tobacco: Never   Vaping Use    Vaping Use: Former    Quit date: 12/12/2021    Substances: Nicotine, Flavoring    Devices: Disposable, Pre-filled or refillable cartridge   Substance and Sexual Activity    Alcohol use: Yes     Comment: social-  3 drinks twice monthly    Drug use: No    Sexual activity: Defer     Birth control/protection: I.U.D.       Allergies  Patient has no known allergies.    Current Medication List    Current Outpatient Medications:     lisinopril (PRINIVIL,ZESTRIL) 5 MG tablet, Take 2 tablets by mouth Daily., Disp: , Rfl:     Semaglutide-Weight Management  "(Wegovy) 0.5 MG/0.5ML solution auto-injector, Inject 0.5 mL under the skin into the appropriate area as directed 1 (One) Time Per Week., Disp: 2 mL, Rfl: 0    vitamin D (ERGOCALCIFEROL) 1.25 MG (84026 UT) capsule capsule, Take 1 capsule by mouth 1 (One) Time Per Week., Disp: 4 capsule, Rfl: 2  Medications that contribute to weight gain or \"None\" : None  Drug Interactions  Wegovy+ Lisinopril: Lisinopril may enhance hypoglcyemic events.     Relevant Laboratory Values  Lab Results   Component Value Date    CHOL 153 10/11/2023    CHLPL 146 02/21/2023    TRIG 142 10/11/2023    HDL 38 (L) 10/11/2023    LDL 90 10/11/2023     There is no height or weight on file to calculate BMI.    Vaccinations:   Patient recommended to keep up with routine vaccinations.     Goals of Therapy  Clinical Goals or Therapeutic Targets: Prevent weight associated co-morbidities and complications      Date   10/24/23   11/21/23   12/15/23     Weight (lb) 250.2 lbs 248.8 lbs 242.4 lb     BMI kg/ 39.19 38.97 37.97     Waist Circumference (in)  48.5\" 48.75\" 47.5\"         Medication Assessment & Plan    Patient has current BMI of 37.97, which is considered Class II Obesity. Patient has lost a total of 7.8 lbs since starting therapy. Patient congratulated on success thus far!    Will order Wegovy 1 mg SC weekly.      Liliam Crooks is a female of childbearing age.  She denies pregnancy, planning to become pregnant or breastfeeding.   If patient were to become pregnant while on medication, instructed her to stop drug immediately and inform her provider.     The following medications will need dose adjustments/closer monitoring once the GLP1 is started: none     Would recommend monitoring liver enzymes as slightly elevated. Patient reported referring provider discussed with her and that she understood that she believes she anticipates this correcting as she loses weight. Patient denied an known liver disease/impairment. Patient has had gallbladder " removed.     Although risk is low, did  patient on s/sx of hypoglycemia with Wegovy + Lisinopril. Patient aware of the s/sx to monitor and the rule of 15 to correct. Pt denies any low blood sugar today.     Advised patient to get labs before next visit: Placed order for CMP, Thyroid Panel, Lipid Panel, Hgb A1C    Discussed lifestyle modifications, including diet and exercise.  Patient education provided.     Worked with patient to create SMART Goal(s):   Increase water intake to 64 oz per day  To start decreasing pop intake from ~2-3 cans per day to 1 per day with goal of eliminating as she continues in the process.     Will follow-up with patient in clinic in 4 weeks.     Krystle Black, PharmD  12/15/2023  16:17 EST

## 2023-12-21 ENCOUNTER — TELEPHONE (OUTPATIENT)
Dept: PHARMACY | Facility: HOSPITAL | Age: 23
End: 2023-12-21

## 2023-12-21 NOTE — TELEPHONE ENCOUNTER
Called the following medications  to [facility] in for patient per request of MONA Vaughan.    Zofran 8MG PO q 12hrs PRN #30 2 refills  Prilosec 40MG one PO q hs #30 2 refills  Pepcid 20 MG one PO q HS #30 2 refills

## 2024-01-05 ENCOUNTER — LAB (OUTPATIENT)
Dept: LAB | Facility: HOSPITAL | Age: 24
End: 2024-01-05
Payer: COMMERCIAL

## 2024-01-05 LAB
ALBUMIN SERPL-MCNC: 4.4 G/DL (ref 3.5–5.2)
ALBUMIN/GLOB SERPL: 1.5 G/DL
ALP SERPL-CCNC: 79 U/L (ref 39–117)
ALT SERPL W P-5'-P-CCNC: 91 U/L (ref 1–33)
ANION GAP SERPL CALCULATED.3IONS-SCNC: 10.9 MMOL/L (ref 5–15)
AST SERPL-CCNC: 44 U/L (ref 1–32)
BILIRUB SERPL-MCNC: 0.6 MG/DL (ref 0–1.2)
BUN SERPL-MCNC: 8 MG/DL (ref 6–20)
BUN/CREAT SERPL: 7.5 (ref 7–25)
CALCIUM SPEC-SCNC: 9.4 MG/DL (ref 8.6–10.5)
CHLORIDE SERPL-SCNC: 102 MMOL/L (ref 98–107)
CHOLEST SERPL-MCNC: 120 MG/DL (ref 0–200)
CO2 SERPL-SCNC: 24.1 MMOL/L (ref 22–29)
CREAT SERPL-MCNC: 1.06 MG/DL (ref 0.57–1)
EGFRCR SERPLBLD CKD-EPI 2021: 75.9 ML/MIN/1.73
GLOBULIN UR ELPH-MCNC: 2.9 GM/DL
GLUCOSE SERPL-MCNC: 71 MG/DL (ref 65–99)
HBA1C MFR BLD: 4.7 % (ref 4.8–5.6)
HDLC SERPL-MCNC: 31 MG/DL (ref 40–60)
LDLC SERPL CALC-MCNC: 68 MG/DL (ref 0–100)
LDLC/HDLC SERPL: 2.12 {RATIO}
POTASSIUM SERPL-SCNC: 3.9 MMOL/L (ref 3.5–5.2)
PROT SERPL-MCNC: 7.3 G/DL (ref 6–8.5)
SODIUM SERPL-SCNC: 137 MMOL/L (ref 136–145)
T-UPTAKE NFR SERPL: 0.98 TBI (ref 0.8–1.3)
T4 SERPL-MCNC: 7.63 MCG/DL (ref 4.5–11.7)
TRIGL SERPL-MCNC: 117 MG/DL (ref 0–150)
TSH SERPL DL<=0.05 MIU/L-ACNC: 1.22 UIU/ML (ref 0.27–4.2)
VLDLC SERPL-MCNC: 21 MG/DL (ref 5–40)

## 2024-01-05 PROCEDURE — 82306 VITAMIN D 25 HYDROXY: CPT | Performed by: PHYSICIAN ASSISTANT

## 2024-01-05 PROCEDURE — 82728 ASSAY OF FERRITIN: CPT | Performed by: PHYSICIAN ASSISTANT

## 2024-01-05 PROCEDURE — 83540 ASSAY OF IRON: CPT | Performed by: PHYSICIAN ASSISTANT

## 2024-01-05 PROCEDURE — 82607 VITAMIN B-12: CPT | Performed by: PHYSICIAN ASSISTANT

## 2024-01-05 PROCEDURE — 82746 ASSAY OF FOLIC ACID SERUM: CPT | Performed by: PHYSICIAN ASSISTANT

## 2024-01-06 LAB
25(OH)D3+25(OH)D2 SERPL-MCNC: 20.2 NG/ML (ref 30–100)
FERRITIN SERPL-MCNC: 118 NG/ML (ref 15–150)
FOLATE SERPL-MCNC: 4.9 NG/ML
IRON SERPL-MCNC: 92 UG/DL (ref 27–159)
VIT B12 SERPL-MCNC: 334 PG/ML (ref 232–1245)

## 2024-01-10 ENCOUNTER — OFFICE VISIT (OUTPATIENT)
Dept: FAMILY MEDICINE CLINIC | Age: 24
End: 2024-01-10
Payer: COMMERCIAL

## 2024-01-10 ENCOUNTER — HOSPITAL ENCOUNTER (OUTPATIENT)
Dept: ULTRASOUND IMAGING | Facility: HOSPITAL | Age: 24
Discharge: HOME | End: 2024-01-10
Admitting: NURSE PRACTITIONER
Payer: COMMERCIAL

## 2024-01-10 VITALS
SYSTOLIC BLOOD PRESSURE: 146 MMHG | HEART RATE: 73 BPM | BODY MASS INDEX: 36.34 KG/M2 | DIASTOLIC BLOOD PRESSURE: 83 MMHG | OXYGEN SATURATION: 100 % | WEIGHT: 232 LBS | TEMPERATURE: 96.2 F

## 2024-01-10 DIAGNOSIS — R79.89 ELEVATED LFTS: ICD-10-CM

## 2024-01-10 DIAGNOSIS — E66.01 CLASS 2 SEVERE OBESITY WITH SERIOUS COMORBIDITY AND BODY MASS INDEX (BMI) OF 36.0 TO 36.9 IN ADULT, UNSPECIFIED OBESITY TYPE: ICD-10-CM

## 2024-01-10 DIAGNOSIS — R79.89 ELEVATED LFTS: Primary | ICD-10-CM

## 2024-01-10 DIAGNOSIS — Z11.3 SCREENING FOR STD (SEXUALLY TRANSMITTED DISEASE): ICD-10-CM

## 2024-01-10 PROCEDURE — 76705 ECHO EXAM OF ABDOMEN: CPT

## 2024-01-10 NOTE — PROGRESS NOTES
Chief Complaint  Follow-up    Subjective      History of Present Illness        Liliam Crooks is a 23 y.o. female who presents to [facility] for follow up of weight loss management. Pt recently had labwork done on 24 and LFTs were elevated. Pt does report drinking alcohol over the New Year. She denies abdominal or back pain, N/V or diarrhea and states that her reflux is controlled with medication. Pt does not have a gallbladder. She will abstain from alcohol.     BMI Readings from Last 1 Encounters:   01/10/24 36.34 kg/m²        Patient is currently taking medication Wegovy 1mg .  She does experience side effects such as mild GERD.   Hercurrent weight is 232 for a total weight loss of 16 lbs.     Past Medical History:   Diagnosis Date    Gallstones     s/p gui    Heartburn     no prior EGD, no prior h pylori, tums prn    History of bronchitis     History of ear infections     Primary hypertension 2023     Past Surgical History:   Procedure Laterality Date    ADENOIDECTOMY  2009     SECTION Bilateral 2022    Procedure:  SECTION PRIMARY;  Surgeon: Nicki Mathews DO;  Location: [facility] LABOR DELIVERY;  Service: Obstetrics/Gynecology;  Laterality: Bilateral;    CHOLECYSTECTOMY N/A 2022    Procedure: CHOLECYSTECTOMY LAPAROSCOPIC WITH DAVINCI ROBOT;  Surgeon: Cristino Welch MD;  Location: [facility] OR;  Service: Robotics - DaVinci;  Laterality: N/A;    EAR TUBES        Family History   Problem Relation Age of Onset    No Known Problems Mother     Hypertension Father     Obesity Father     Diabetes Maternal Aunt     Diabetes Paternal Aunt     Hypertension Maternal Grandmother     Heart attack Maternal Grandmother 68    Cancer Paternal Grandmother     Hypertension Paternal Grandmother     Hypertension Paternal Grandfather       Social History     Socioeconomic History    Marital status: Single   Tobacco Use    Smoking status: Never     "Smokeless tobacco: Never   Vaping Use    Vaping Use: Former    Quit date: 12/12/2021    Substances: Nicotine, Flavoring    Devices: Disposable, Pre-filled or refillable cartridge   Substance and Sexual Activity    Alcohol use: Yes     Comment: social-  3 drinks twice monthly    Drug use: No    Sexual activity: Defer     Birth control/protection: I.U.D.      No Known Allergies   Current Outpatient Medications on File Prior to Visit   Medication Sig Dispense Refill    famotidine (PEPCID) 20 MG tablet Take 1 tablet by mouth Daily. 30 tablet 2    lisinopril (PRINIVIL,ZESTRIL) 5 MG tablet Take 2 tablets by mouth Daily. PRN      omeprazole (priLOSEC) 40 MG capsule Take 1 capsule by mouth Daily. 30 capsule 2    ondansetron (Zofran) 8 MG tablet Take 1 tablet by mouth Every 12 (Twelve) Hours As Needed. 30 tablet 2    Semaglutide-Weight Management (Wegovy) 1 MG/0.5ML solution auto-injector Inject 0.5 mL under the skin into the appropriate area as directed 1 (One) Time Per Week. 2 mL 0    vitamin D (ERGOCALCIFEROL) 1.25 MG (83501 UT) capsule capsule Take 1 capsule by mouth 1 (One) Time Per Week. (Patient not taking: Reported on 1/10/2024) 4 capsule 2     No current facility-administered medications on file prior to visit.        The following portions of the patient's history were reviewed and updated as appropriate: allergies, current medications, past family history, past social history, past medical history, past surgical history and problem list.     Objective   Vital Signs:  /83 (BP Location: Right arm, Patient Position: Sitting, Cuff Size: Adult)   Pulse 73   Temp 96.2 °F (35.7 °C) (Temporal)   Wt 105 kg (232 lb)   SpO2 100%   BMI 36.34 kg/m²   Estimated body mass index is 36.34 kg/m² as calculated from the following:    Height as of 12/15/23: 170.2 cm (67\").    Weight as of this encounter: 105 kg (232 lb).               Physical Exam  Constitutional:       Appearance: She is obese.   HENT:      Head: " Normocephalic.      Mouth/Throat:      Pharynx: Oropharynx is clear.   Eyes:      Extraocular Movements: Extraocular movements intact.      Pupils: Pupils are equal, round, and reactive to light.   Cardiovascular:      Rate and Rhythm: Normal rate and regular rhythm.   Pulmonary:      Effort: Pulmonary effort is normal.      Breath sounds: Normal breath sounds.   Abdominal:      General: Bowel sounds are normal.      Palpations: Abdomen is soft.      Comments: No abdominal tenderness on palpation.    Musculoskeletal:      Cervical back: Normal range of motion and neck supple.   Skin:     General: Skin is warm and dry.   Neurological:      General: No focal deficit present.      Mental Status: She is alert and oriented to person, place, and time.          Disease State Management Visit on 12/15/2023   Component Date Value Ref Range Status    Glucose 01/05/2024 71  65 - 99 mg/dL Final    BUN 01/05/2024 8  6 - 20 mg/dL Final    Creatinine 01/05/2024 1.06 (H)  0.57 - 1.00 mg/dL Final    Sodium 01/05/2024 137  136 - 145 mmol/L Final    Potassium 01/05/2024 3.9  3.5 - 5.2 mmol/L Final    Chloride 01/05/2024 102  98 - 107 mmol/L Final    CO2 01/05/2024 24.1  22.0 - 29.0 mmol/L Final    Calcium 01/05/2024 9.4  8.6 - 10.5 mg/dL Final    Total Protein 01/05/2024 7.3  6.0 - 8.5 g/dL Final    Albumin 01/05/2024 4.4  3.5 - 5.2 g/dL Final    ALT (SGPT) 01/05/2024 91 (H)  1 - 33 U/L Final    AST (SGOT) 01/05/2024 44 (H)  1 - 32 U/L Final    Alkaline Phosphatase 01/05/2024 79  39 - 117 U/L Final    Total Bilirubin 01/05/2024 0.6  0.0 - 1.2 mg/dL Final    Globulin 01/05/2024 2.9  gm/dL Final    A/G Ratio 01/05/2024 1.5  g/dL Final    BUN/Creatinine Ratio 01/05/2024 7.5  7.0 - 25.0 Final    Anion Gap 01/05/2024 10.9  5.0 - 15.0 mmol/L Final    eGFR 01/05/2024 75.9  >60.0 mL/min/1.73 Final    TSH 01/05/2024 1.220  0.270 - 4.200 uIU/mL Final    T Uptake 01/05/2024 0.98  0.80 - 1.30 TBI Final    T4, Total 01/05/2024 7.63  4.50 - 11.70  mcg/dL Final    T4 results may be falsely increased if patient taking Biotin.    Total Cholesterol 01/05/2024 120  0 - 200 mg/dL Final    Triglycerides 01/05/2024 117  0 - 150 mg/dL Final    HDL Cholesterol 01/05/2024 31 (L)  40 - 60 mg/dL Final    LDL Cholesterol  01/05/2024 68  0 - 100 mg/dL Final    VLDL Cholesterol 01/05/2024 21  5 - 40 mg/dL Final    LDL/HDL Ratio 01/05/2024 2.12   Final    Hemoglobin A1C 01/05/2024 4.70 (L)  4.80 - 5.60 % Final   Lab on 10/11/2023   Component Date Value Ref Range Status    25 Hydroxy, Vitamin D 10/11/2023 23.4 (L)  30.0 - 100.0 ng/ml Final    WBC 10/11/2023 5.41  3.40 - 10.80 10*3/mm3 Final    RBC 10/11/2023 4.86  3.77 - 5.28 10*6/mm3 Final    Hemoglobin 10/11/2023 15.0  12.0 - 15.9 g/dL Final    Hematocrit 10/11/2023 43.5  34.0 - 46.6 % Final    MCV 10/11/2023 89.5  79.0 - 97.0 fL Final    MCH 10/11/2023 30.9  26.6 - 33.0 pg Final    MCHC 10/11/2023 34.5  31.5 - 35.7 g/dL Final    RDW 10/11/2023 12.5  12.3 - 15.4 % Final    RDW-SD 10/11/2023 40.4  37.0 - 54.0 fl Final    MPV 10/11/2023 9.3  6.0 - 12.0 fL Final    Platelets 10/11/2023 240  140 - 450 10*3/mm3 Final    Glucose 10/11/2023 73  65 - 99 mg/dL Final    BUN 10/11/2023 14  6 - 20 mg/dL Final    Creatinine 10/11/2023 0.94  0.57 - 1.00 mg/dL Final    Sodium 10/11/2023 138  136 - 145 mmol/L Final    Potassium 10/11/2023 4.2  3.5 - 5.2 mmol/L Final    Chloride 10/11/2023 103  98 - 107 mmol/L Final    CO2 10/11/2023 25.5  22.0 - 29.0 mmol/L Final    Calcium 10/11/2023 9.2  8.6 - 10.5 mg/dL Final    Total Protein 10/11/2023 7.1  6.0 - 8.5 g/dL Final    Albumin 10/11/2023 4.3  3.5 - 5.2 g/dL Final    ALT (SGPT) 10/11/2023 49 (H)  1 - 33 U/L Final    AST (SGOT) 10/11/2023 22  1 - 32 U/L Final    Alkaline Phosphatase 10/11/2023 89  39 - 117 U/L Final    Total Bilirubin 10/11/2023 0.4  0.0 - 1.2 mg/dL Final    Globulin 10/11/2023 2.8  gm/dL Final    A/G Ratio 10/11/2023 1.5  g/dL Final    BUN/Creatinine Ratio 10/11/2023 14.9   7.0 - 25.0 Final    Anion Gap 10/11/2023 9.5  5.0 - 15.0 mmol/L Final    eGFR 10/11/2023 87.6  >60.0 mL/min/1.73 Final    C-Reactive Protein 10/11/2023 <0.30  0.00 - 0.50 mg/dL Final    Folate 10/11/2023 5.57  4.78 - 24.20 ng/mL Final    Hemoglobin A1C 10/11/2023 5.10  4.80 - 5.60 % Final    Total Cholesterol 10/11/2023 153  0 - 200 mg/dL Final    Triglycerides 10/11/2023 142  0 - 150 mg/dL Final    HDL Cholesterol 10/11/2023 38 (L)  40 - 60 mg/dL Final    LDL Cholesterol  10/11/2023 90  0 - 100 mg/dL Final    VLDL Cholesterol 10/11/2023 25  5 - 40 mg/dL Final    Chol/HDL Ratio 10/11/2023 4.03   Final    T3, Total 10/11/2023 114.0  80.0 - 200.0 ng/dl Final    Free T4 10/11/2023 1.27  0.93 - 1.70 ng/dL Final    TSH 10/11/2023 1.270  0.270 - 4.200 uIU/mL Final    Vitamin B-12 10/11/2023 256  211 - 946 pg/mL Final    HCG, Urine QL 10/11/2023 Negative  Negative Final       Result Review :         Assessment and Plan     Diagnoses and all orders for this visit:    1. Elevated LFTs (Primary)  -     Hepatic Function Panel; Future  -     Amylase; Future  -     Lipase; Future  -     C-reactive Protein    2. Class 2 severe obesity with serious comorbidity and body mass index (BMI) of 36.0 to 36.9 in adult, unspecified obesity type       May continue weekly injection until we repeat labs       Pt is tolerating the weight loss medication and understands risks and benefits as discussed.  Pt is to continue current weight loss medication.       Follow Up   No follow-ups on file.  Patient was given instructions and counseling regarding her condition or for health maintenance advice. Please see specific information pulled into the AVS if appropriate.   Patient is to continue routine follow up with the [facility] and follow up with any labs ordered.         This document has been electronically signed by MONA Llanos  January 10, 2024 13:04 EST

## 2024-01-12 ENCOUNTER — LAB (OUTPATIENT)
Dept: LAB | Facility: HOSPITAL | Age: 24
End: 2024-01-12
Payer: COMMERCIAL

## 2024-01-12 DIAGNOSIS — R79.89 ELEVATED LFTS: ICD-10-CM

## 2024-01-12 DIAGNOSIS — Z11.3 SCREENING FOR STD (SEXUALLY TRANSMITTED DISEASE): ICD-10-CM

## 2024-01-12 LAB
ALBUMIN SERPL-MCNC: 4 G/DL (ref 3.5–5.2)
ALBUMIN/GLOB SERPL: 1.4 G/DL
ALP SERPL-CCNC: 68 U/L (ref 39–117)
ALPHA-FETOPROTEIN: <2 NG/ML (ref 0–8.3)
ALT SERPL W P-5'-P-CCNC: 69 U/L (ref 1–33)
AMYLASE SERPL-CCNC: 42 U/L (ref 28–100)
ANION GAP SERPL CALCULATED.3IONS-SCNC: 11.6 MMOL/L (ref 5–15)
AST SERPL-CCNC: 37 U/L (ref 1–32)
BILIRUB CONJ SERPL-MCNC: <0.2 MG/DL (ref 0–0.3)
BILIRUB SERPL-MCNC: 0.3 MG/DL (ref 0–1.2)
BUN SERPL-MCNC: 9 MG/DL (ref 6–20)
BUN/CREAT SERPL: 9.9 (ref 7–25)
CALCIUM SPEC-SCNC: 9.2 MG/DL (ref 8.6–10.5)
CHLORIDE SERPL-SCNC: 105 MMOL/L (ref 98–107)
CO2 SERPL-SCNC: 21.4 MMOL/L (ref 22–29)
CREAT SERPL-MCNC: 0.91 MG/DL (ref 0.57–1)
CRP SERPL-MCNC: <0.3 MG/DL (ref 0–0.5)
EGFRCR SERPLBLD CKD-EPI 2021: 91.1 ML/MIN/1.73
FERRITIN SERPL-MCNC: 138 NG/ML (ref 13–150)
GGT SERPL-CCNC: 48 U/L (ref 5–36)
GLOBULIN UR ELPH-MCNC: 2.9 GM/DL
GLUCOSE SERPL-MCNC: 72 MG/DL (ref 65–99)
HIV 1+2 AB+HIV1 P24 AG SERPL QL IA: NORMAL
IRON 24H UR-MRATE: 90 MCG/DL (ref 37–145)
IRON SATN MFR SERPL: 26 % (ref 20–50)
LIPASE SERPL-CCNC: 68 U/L (ref 13–60)
POTASSIUM SERPL-SCNC: 3.8 MMOL/L (ref 3.5–5.2)
PROT SERPL-MCNC: 6.9 G/DL (ref 6–8.5)
SODIUM SERPL-SCNC: 138 MMOL/L (ref 136–145)
TIBC SERPL-MCNC: 347 MCG/DL (ref 298–536)
TRANSFERRIN SERPL-MCNC: 233 MG/DL (ref 200–360)

## 2024-01-12 PROCEDURE — 86225 DNA ANTIBODY NATIVE: CPT

## 2024-01-12 PROCEDURE — 82105 ALPHA-FETOPROTEIN SERUM: CPT

## 2024-01-12 PROCEDURE — 86038 ANTINUCLEAR ANTIBODIES: CPT

## 2024-01-12 PROCEDURE — 87340 HEPATITIS B SURFACE AG IA: CPT

## 2024-01-12 PROCEDURE — 82150 ASSAY OF AMYLASE: CPT

## 2024-01-12 PROCEDURE — 36415 COLL VENOUS BLD VENIPUNCTURE: CPT

## 2024-01-12 PROCEDURE — 84466 ASSAY OF TRANSFERRIN: CPT

## 2024-01-12 PROCEDURE — 86704 HEP B CORE ANTIBODY TOTAL: CPT

## 2024-01-12 PROCEDURE — 86592 SYPHILIS TEST NON-TREP QUAL: CPT

## 2024-01-12 PROCEDURE — 86140 C-REACTIVE PROTEIN: CPT | Performed by: NURSE PRACTITIONER

## 2024-01-12 PROCEDURE — 83690 ASSAY OF LIPASE: CPT

## 2024-01-12 PROCEDURE — G0432 EIA HIV-1/HIV-2 SCREEN: HCPCS

## 2024-01-12 PROCEDURE — 82784 ASSAY IGA/IGD/IGG/IGM EACH: CPT

## 2024-01-12 PROCEDURE — 86381 MITOCHONDRIAL ANTIBODY EACH: CPT

## 2024-01-12 PROCEDURE — 86696 HERPES SIMPLEX TYPE 2 TEST: CPT

## 2024-01-12 PROCEDURE — 86364 TISS TRNSGLTMNASE EA IG CLAS: CPT

## 2024-01-12 PROCEDURE — 82248 BILIRUBIN DIRECT: CPT

## 2024-01-12 PROCEDURE — 86803 HEPATITIS C AB TEST: CPT

## 2024-01-12 PROCEDURE — 83540 ASSAY OF IRON: CPT

## 2024-01-12 PROCEDURE — 81332 SERPINA1 GENE: CPT

## 2024-01-12 PROCEDURE — 86706 HEP B SURFACE ANTIBODY: CPT

## 2024-01-12 PROCEDURE — 82728 ASSAY OF FERRITIN: CPT

## 2024-01-12 PROCEDURE — 82977 ASSAY OF GGT: CPT

## 2024-01-12 PROCEDURE — 80053 COMPREHEN METABOLIC PANEL: CPT

## 2024-01-12 PROCEDURE — 86695 HERPES SIMPLEX TYPE 1 TEST: CPT

## 2024-01-12 PROCEDURE — 86231 EMA EACH IG CLASS: CPT

## 2024-01-13 LAB
HSV1 IGG SER IA-ACNC: 30.6 INDEX (ref 0–0.9)
HSV2 IGG SER IA-ACNC: <0.91 INDEX (ref 0–0.9)
MITOCHONDRIA M2 IGG SER-ACNC: <20 UNITS (ref 0–20)
RPR SER QL: NORMAL

## 2024-01-15 ENCOUNTER — DISEASE STATE MANAGEMENT VISIT (OUTPATIENT)
Dept: PHARMACY | Facility: HOSPITAL | Age: 24
End: 2024-01-15
Payer: COMMERCIAL

## 2024-01-15 VITALS
HEART RATE: 85 BPM | WEIGHT: 230.2 LBS | BODY MASS INDEX: 36.13 KG/M2 | DIASTOLIC BLOOD PRESSURE: 81 MMHG | SYSTOLIC BLOOD PRESSURE: 136 MMHG | HEIGHT: 67 IN

## 2024-01-15 LAB
ANA SER QL: POSITIVE
DSDNA AB SER-ACNC: 1 IU/ML (ref 0–9)
ENDOMYSIUM IGA SER QL: NEGATIVE
IGA SERPL-MCNC: 357 MG/DL (ref 87–352)
Lab: NORMAL
TTG IGA SER-ACNC: <2 U/ML (ref 0–3)

## 2024-01-15 RX ORDER — SEMAGLUTIDE 1.7 MG/.75ML
1.7 INJECTION, SOLUTION SUBCUTANEOUS WEEKLY
Qty: 3 ML | Refills: 0 | Status: SHIPPED | OUTPATIENT
Start: 2024-01-15

## 2024-01-15 NOTE — PROGRESS NOTES
"   [facility]  Weight Management Program      Liliam Crooks is a 23 y.o. female referred to the [facility] by Adore Jones for clinical pharmacy and specialty pharmacy management of GLP1 for weight management.  The patient denies a personal history or family history of thyroid cancer and reports/denies a personal history of pancreatitis.     Liliam Crooks has previously tried Adipex, weight watchers, keto, and lifestyle changes for weight loss. Current weight loss efforts include monitoring intake.     Patient is currently on Wegovy 1 mg weekly. Patient denies any lumps/swelling/hoarseness in neck/throat or abdominal pain. Patient reports tolerating medication well without any side effects other than some occasional nausea and indigestion that were worst with the first 2 injections. Patient reports this has improved now.  Patient denies any trouble giving injection or any missed doses. Patient wishes to titrate dose at this time. Patient reports that she is doing well with her water intake goal and has pretty much cut out soda completely.    Patient completed lab work prior to this visit and reported that her liver enzymes were elevated so Adore had her come in for a visit. She reported she had a liver US and had her to repeat labs and said that it appeared she had \"fatty liver\" and Adore wasn't too concerned.       Relevant Past Medical History and Co-morbidities  Past Medical History:   Diagnosis Date    Gallstones     s/p gui    Heartburn     no prior EGD, no prior h pylori, tums prn    History of bronchitis     History of ear infections     Primary hypertension 2/21/2023     Social History     Socioeconomic History    Marital status: Single   Tobacco Use    Smoking status: Never    Smokeless tobacco: Never   Vaping Use    Vaping Use: Former    Quit date: 12/12/2021    Substances: Nicotine, Flavoring    Devices: Disposable, Pre-filled or refillable cartridge   Substance " "and Sexual Activity    Alcohol use: Yes     Comment: social-  3 drinks twice monthly    Drug use: No    Sexual activity: Defer     Birth control/protection: I.U.D.       Allergies  Patient has no known allergies.    Current Medication List    Current Outpatient Medications:     famotidine (PEPCID) 20 MG tablet, Take 1 tablet by mouth Daily., Disp: 30 tablet, Rfl: 2    lisinopril (PRINIVIL,ZESTRIL) 5 MG tablet, Take 2 tablets by mouth Daily. PRN, Disp: , Rfl:     omeprazole (priLOSEC) 40 MG capsule, Take 1 capsule by mouth Daily., Disp: 30 capsule, Rfl: 2    ondansetron (Zofran) 8 MG tablet, Take 1 tablet by mouth Every 12 (Twelve) Hours As Needed., Disp: 30 tablet, Rfl: 2    Semaglutide-Weight Management (Wegovy) 1 MG/0.5ML solution auto-injector, Inject 0.5 mL under the skin into the appropriate area as directed 1 (One) Time Per Week., Disp: 2 mL, Rfl: 0    vitamin D (ERGOCALCIFEROL) 1.25 MG (43102 UT) capsule capsule, Take 1 capsule by mouth 1 (One) Time Per Week. (Patient not taking: Reported on 1/10/2024), Disp: 4 capsule, Rfl: 2  Medications that contribute to weight gain or \"None\" : None  Drug Interactions  Wegovy+ Lisinopril: Lisinopril may enhance hypoglcyemic events.     Relevant Laboratory Values  Lab Results   Component Value Date    CHOL 120 01/05/2024    CHLPL 146 02/21/2023    TRIG 117 01/05/2024    HDL 31 (L) 01/05/2024    LDL 68 01/05/2024     There is no height or weight on file to calculate BMI.    Vaccinations:   Patient recommended to keep up with routine vaccinations.     Goals of Therapy  Clinical Goals or Therapeutic Targets: Prevent weight associated co-morbidities and complications      Date   10/24/23   11/21/23   12/15/23   1/15/23    Weight (lb) 250.2 lbs 248.8 lbs 242.4 lb 230.2 lb    BMI kg/ 39.19 38.97 37.97 36.05\"    Waist Circumference (in)  48.5\" 48.75\" 47.5\" 48\"        Medication Assessment & Plan    Patient has current BMI of 36.05, which is considered Class II Obesity. Patient " has lost a total of 20 lbs since starting therapy. Patient congratulated on success thus far!    Will order Wegovy 1.7 mg SC weekly.      Liliam Crooks is a female of childbearing age.  She denies pregnancy, planning to become pregnant or breastfeeding.   If patient were to become pregnant while on medication, instructed her to stop drug immediately and inform her provider.     The following medications will need dose adjustments/closer monitoring once the GLP1 is started: none     Patient completed lab work. She has been to follow-up with referring provider due to elevated liver enzymes. Adore ordered more lab work and liver US. Last note had for patient to continue Wegovy until repeat lab work. I have sent patient Rx today as she is not due an injection until Thursday and reached out to Adore to confirm she is okay with patient continuing medication.     Although risk is low, did  patient on s/sx of hypoglycemia with Wegovy + Lisinopril. Patient aware of the s/sx to monitor and the rule of 15 to correct. Pt denies any low blood sugar today.     Discussed lifestyle modifications, including diet and exercise.  Patient education provided.     Worked with patient to create SMART Goal(s):   Increase water intake to 64 oz per day  To start decreasing pop intake from ~2-3 cans per day to 1 per day with goal of eliminating as she continues in the process.     Will follow-up with patient in clinic in 4 weeks.     Leandra Chong. Roslyn, PharmD  1/15/2024  16:05 EST

## 2024-01-16 NOTE — PROGRESS NOTES
Adore responded and reported she has reviewed labs that have resulted and will follow closely with pending lab work and that she is okay to continue Wegovy.    Thank you,    Leandra Chong. Roslyn, PharmD  01/16/24  09:44 EST

## 2024-01-23 LAB
LAB DIRECTOR NAME PROVIDER: NORMAL
SERPINA1 GENE MUT ANL BLD/T: NORMAL
SERPINA1 GENE MUT TESTED BLD/T: NORMAL

## 2024-02-14 ENCOUNTER — DISEASE STATE MANAGEMENT VISIT (OUTPATIENT)
Dept: PHARMACY | Facility: HOSPITAL | Age: 24
End: 2024-02-14
Payer: COMMERCIAL

## 2024-02-14 VITALS
SYSTOLIC BLOOD PRESSURE: 134 MMHG | DIASTOLIC BLOOD PRESSURE: 86 MMHG | BODY MASS INDEX: 35.08 KG/M2 | WEIGHT: 224 LBS | HEART RATE: 80 BPM

## 2024-02-20 ENCOUNTER — DISEASE STATE MANAGEMENT VISIT (OUTPATIENT)
Dept: PHARMACY | Facility: HOSPITAL | Age: 24
End: 2024-02-20
Payer: COMMERCIAL

## 2024-02-20 RX ORDER — SEMAGLUTIDE 1.7 MG/.75ML
1.7 INJECTION, SOLUTION SUBCUTANEOUS WEEKLY
Qty: 3 ML | Refills: 0 | Status: SHIPPED | OUTPATIENT
Start: 2024-02-20

## 2024-02-20 RX ORDER — SEMAGLUTIDE 1.7 MG/.75ML
1.7 INJECTION, SOLUTION SUBCUTANEOUS WEEKLY
Qty: 3 ML | Refills: 0 | Status: CANCELLED | OUTPATIENT
Start: 2024-02-20

## 2024-02-20 NOTE — PROGRESS NOTES
[facility]ication Management Clinic  Weight Management Program      Liliam Crooks is a 23 y.o. female referred to the [facility] by Adore Jones for clinical pharmacy and specialty pharmacy management of GLP1 for weight management.  The patient denies a personal history or family history of thyroid cancer and reports/denies a personal history of pancreatitis.     Liliam Crooks has previously tried Adipex, weight watchers, keto, and lifestyle changes for weight loss. Current weight loss efforts include monitoring intake.     Patient is currently on Wegovy 1.7 mg weekly. Patient denies any lumps/swelling/hoarseness in neck/throat or abdominal pain. Patient reports tolerating medication well without any side effects other than some occasional nausea at the beginning of the titration. Patient denies any trouble giving injection or any missed doses. Patient wishes to continue same dose and discuss titrating the dose at the next visit. Patient reports that she is doing well with her water intake goal and has went down to 1 can of pop daily.       Relevant Past Medical History and Co-morbidities  Past Medical History:   Diagnosis Date    Gallstones     s/p gui    Heartburn     no prior EGD, no prior h pylori, tums prn    History of bronchitis     History of ear infections     Primary hypertension 2/21/2023     Social History     Socioeconomic History    Marital status: Single   Tobacco Use    Smoking status: Never    Smokeless tobacco: Never   Vaping Use    Vaping Use: Former    Quit date: 12/12/2021    Substances: Nicotine, Flavoring    Devices: Disposable, Pre-filled or refillable cartridge   Substance and Sexual Activity    Alcohol use: Yes     Comment: social-  3 drinks twice monthly    Drug use: No    Sexual activity: Defer     Birth control/protection: I.U.D.       Allergies  Patient has no known allergies.    Current Medication List    Current Outpatient Medications:     famotidine (PEPCID)  "20 MG tablet, Take 1 tablet by mouth Daily., Disp: 30 tablet, Rfl: 2    lisinopril (PRINIVIL,ZESTRIL) 5 MG tablet, Take 2 tablets by mouth Daily. PRN, Disp: , Rfl:     omeprazole (priLOSEC) 40 MG capsule, Take 1 capsule by mouth Daily., Disp: 30 capsule, Rfl: 2    ondansetron (Zofran) 8 MG tablet, Take 1 tablet by mouth Every 12 (Twelve) Hours As Needed., Disp: 30 tablet, Rfl: 2    Semaglutide-Weight Management (Wegovy) 1.7 MG/0.75ML solution auto-injector, Inject 0.75 mL under the skin into the appropriate area as directed 1 (One) Time Per Week., Disp: 3 mL, Rfl: 0    vitamin D (ERGOCALCIFEROL) 1.25 MG (59885 UT) capsule capsule, Take 1 capsule by mouth 1 (One) Time Per Week. (Patient not taking: Reported on 1/10/2024), Disp: 4 capsule, Rfl: 2  Medications that contribute to weight gain or \"None\" : None  Drug Interactions  Wegovy+ Lisinopril: Lisinopril may enhance hypoglcyemic events.     Relevant Laboratory Values  Lab Results   Component Value Date    CHOL 120 01/05/2024    CHLPL 146 02/21/2023    TRIG 117 01/05/2024    HDL 31 (L) 01/05/2024    LDL 68 01/05/2024     There is no height or weight on file to calculate BMI.    Vaccinations:   Patient recommended to keep up with routine vaccinations.     Goals of Therapy  Clinical Goals or Therapeutic Targets: Prevent weight associated co-morbidities and complications      Date   10/24/23   11/21/23   12/15/23   1/15/23   2/14/24   Weight (lb) 250.2 lbs 248.8 lbs 242.4 lb 230.2 lb 224 lb   BMI kg/ 39.19 38.97 37.97 36.05\" 35.08   Waist Circumference (in)  48.5\" 48.75\" 47.5\" 48\" 45\"       Medication Assessment & Plan    Patient has current BMI of 35.08, which is considered Class II Obesity. Patient has lost a total of 26.2 lbs since starting therapy. Patient congratulated on success thus far!    Will re-order Wegovy 1.7 mg SC weekly. Will discuss increasing to 2.4 mg at the next visit.       2/20/24 update: No changes since visit on 2/14/24 however Rx was not sent. Rx " sent today.    Liliam Crooks is a female of childbearing age.  She denies pregnancy, planning to become pregnant or breastfeeding. If patient were to become pregnant while on medication, instructed her to stop drug immediately and inform her provider.     The following medications will need dose adjustments/closer monitoring once the GLP1 is started: none     I have sent patient Rx today to our [facility]othe[facility].     Although risk is low, did  patient on s/sx of hypoglycemia with Wegovy + Lisinopril. Patient aware of the s/sx to monitor and the rule of 15 to correct. Pt denies any low blood sugar today.     Discussed lifestyle modifications, including diet and exercise.  Patient education provided.     Worked with patient to create SMART Goal(s):   Increase water intake to 64 oz per day  To start decreasing pop intake from ~2-3 cans per day to 1 per day with goal of eliminating as she continues in the process.     Will follow-up with patient in clinic in 4 weeks.     Jackson Anthony, PharmD   2/20/2024  11:27 EST

## 2024-03-13 ENCOUNTER — DISEASE STATE MANAGEMENT VISIT (OUTPATIENT)
Dept: PHARMACY | Facility: HOSPITAL | Age: 24
End: 2024-03-13
Payer: COMMERCIAL

## 2024-03-13 VITALS
SYSTOLIC BLOOD PRESSURE: 140 MMHG | DIASTOLIC BLOOD PRESSURE: 75 MMHG | HEART RATE: 85 BPM | HEIGHT: 67 IN | BODY MASS INDEX: 34.34 KG/M2 | WEIGHT: 218.8 LBS

## 2024-03-13 RX ORDER — SEMAGLUTIDE 2.4 MG/.75ML
2.4 INJECTION, SOLUTION SUBCUTANEOUS WEEKLY
Qty: 3 ML | Refills: 0 | Status: SHIPPED | OUTPATIENT
Start: 2024-03-13

## 2024-03-13 NOTE — PROGRESS NOTES
[facility]  Weight Management Program      Liliam Crooks is a 23 y.o. female referred to the [facility] by Adore Jones for clinical pharmacy and specialty pharmacy management of GLP1 for weight management.  The patient denies a personal history or family history of thyroid cancer and reports/denies a personal history of pancreatitis.     Liliam Crooks has previously tried Adipex, weight watchers, keto, and lifestyle changes for weight loss. Current weight loss efforts include monitoring intake.     Patient is currently on Wegovy 1.7 mg weekly. Patient denies any lumps/swelling/hoarseness in neck/throat or abdominal pain. Patient reports tolerating medication well without any side effects. Patient denies any trouble giving injection or any missed doses. Patient wishes to continue same dose and discuss titrating the dose at the next visit. Patient reports that she is trying to target with her water intake goal and has went down to 1 can of pop daily. Patient prefers to continue targeting these goals.  Patient reports she is ready to titrate dose.     Patient SBP slightly elevated today.       Relevant Past Medical History and Co-morbidities  Past Medical History:   Diagnosis Date    Gallstones     s/p gui    Heartburn     no prior EGD, no prior h pylori, tums prn    History of bronchitis     History of ear infections     Primary hypertension 2/21/2023     Social History     Socioeconomic History    Marital status: Single   Tobacco Use    Smoking status: Never    Smokeless tobacco: Never   Vaping Use    Vaping status: Former    Quit date: 12/12/2021    Substances: Nicotine, Flavoring    Devices: Disposable, Pre-filled or refillable cartridge   Substance and Sexual Activity    Alcohol use: Yes     Comment: social-  3 drinks twice monthly    Drug use: No    Sexual activity: Defer     Birth control/protection: I.U.D.       Allergies  Patient has no known  "allergies.    Current Medication List    Current Outpatient Medications:     famotidine (PEPCID) 20 MG tablet, Take 1 tablet by mouth Daily., Disp: 30 tablet, Rfl: 2    lisinopril (PRINIVIL,ZESTRIL) 5 MG tablet, Take 2 tablets by mouth Daily. PRN, Disp: , Rfl:     omeprazole (priLOSEC) 40 MG capsule, Take 1 capsule by mouth Daily., Disp: 30 capsule, Rfl: 2    ondansetron (Zofran) 8 MG tablet, Take 1 tablet by mouth Every 12 (Twelve) Hours As Needed., Disp: 30 tablet, Rfl: 2    Semaglutide-Weight Management (Wegovy) 1.7 MG/0.75ML solution auto-injector, Inject 0.75 mL under the skin into the appropriate area as directed 1 (One) Time Per Week., Disp: 3 mL, Rfl: 0    vitamin D (ERGOCALCIFEROL) 1.25 MG (52276 UT) capsule capsule, Take 1 capsule by mouth 1 (One) Time Per Week. (Patient not taking: Reported on 1/10/2024), Disp: 4 capsule, Rfl: 2  Medications that contribute to weight gain or \"None\" : None  Drug Interactions  Wegovy+ Lisinopril: Lisinopril may enhance hypoglcyemic events.     Relevant Laboratory Values  Lab Results   Component Value Date    CHOL 120 01/05/2024    CHLPL 146 02/21/2023    TRIG 117 01/05/2024    HDL 31 (L) 01/05/2024    LDL 68 01/05/2024     There is no height or weight on file to calculate BMI.    Vaccinations:   Patient recommended to keep up with routine vaccinations.     Goals of Therapy  Clinical Goals or Therapeutic Targets: Prevent weight associated co-morbidities and complications      Date   10/24/23   11/21/23   12/15/23   1/15/23   2/14/24   3/13/24   Weight (lb) 250.2 lbs 248.8 lbs 242.4 lb 230.2 lb 224 lb 218.8 lb   BMI kg/ 39.19 38.97 37.97 36.05\" 35.08 34.27   Waist Circumference (in)  48.5\" 48.75\" 47.5\" 48\" 45\" 45\"       Medication Assessment & Plan    Patient has current BMI of 34.27, which is considered Class II Obesity. Patient has lost a total of 31.4\" lbs since starting therapy. Patient congratulated on success thus far!    Will order Wegovy 2.4 mg SC weekly.        Liliam" DEIE Crooks is a female of childbearing age.  She denies pregnancy, planning to become pregnant or breastfeeding. If patient were to become pregnant while on medication, instructed her to stop drug immediately and inform her provider.     The following medications will need dose adjustments/closer monitoring once the GLP1 is started: none       Although risk is low, did  patient on s/sx of hypoglycemia with Wegovy + Lisinopril. Patient aware of the s/sx to monitor and the rule of 15 to correct. Pt denies any low blood sugar today.     Patient is unsure if SBP is usually elevated. Patient encouraged to check at home and if SBP staying aroun 140's to reach out to referring provider to get further recommendations.     Discussed lifestyle modifications, including diet and exercise.  Patient education provided.     Worked with patient to create SMART Goal(s):   Increase water intake to 64 oz per day  To start decreasing pop intake from ~2-3 cans per day to 1 per day with goal of eliminating as she continues in the process.     Will follow-up with patient in clinic in 4 weeks.       Thank you,    Leandra Chong. Roslyn, PharmD  03/13/24  15:59 EDT

## 2024-03-19 LAB — REF LAB TEST METHOD: NORMAL

## 2024-04-12 ENCOUNTER — DISEASE STATE MANAGEMENT VISIT (OUTPATIENT)
Dept: PHARMACY | Facility: HOSPITAL | Age: 24
End: 2024-04-12
Payer: COMMERCIAL

## 2024-04-12 VITALS
DIASTOLIC BLOOD PRESSURE: 89 MMHG | BODY MASS INDEX: 32.83 KG/M2 | HEIGHT: 67 IN | SYSTOLIC BLOOD PRESSURE: 157 MMHG | HEART RATE: 87 BPM | WEIGHT: 209.2 LBS

## 2024-04-12 RX ORDER — SEMAGLUTIDE 2.4 MG/.75ML
2.4 INJECTION, SOLUTION SUBCUTANEOUS WEEKLY
Qty: 3 ML | Refills: 0 | Status: SHIPPED | OUTPATIENT
Start: 2024-04-12

## 2024-04-12 NOTE — PROGRESS NOTES
[facility]  Weight Management Program      Liliam Crooks is a 23 y.o. female referred to the [facility] by Adore Jones for clinical pharmacy and specialty pharmacy management of GLP1 for weight management.  The patient denies a personal history or family history of thyroid cancer and reports/denies a personal history of pancreatitis.     Liliam Crooks has previously tried Adipex, weight watchers, keto, and lifestyle changes for weight loss. Current weight loss efforts include monitoring intake.     Patient is currently on Wegovy 2.4 mg weekly. Patient denies any lumps/swelling/hoarseness in neck/throat or abdominal pain. Patient reports tolerating medication well without any side effects. Patient denies any trouble giving injection or any missed doses. Patient wishes to continue same dose and discuss titrating the dose at the next visit. Patient reports that she is trying to target with her water intake goal and her soda intake has decreased. Patient would like to decrease her soda intake goal to 1 can per day, from 2-3 cans per day. Patient prefers to continue targeting these goals.  Patient reports she is ready to titrate dose.     Patient SBP slightly elevated today in clinic at 157/89.     Relevant Past Medical History and Co-morbidities  Past Medical History:   Diagnosis Date    Gallstones     s/p gui    Heartburn     no prior EGD, no prior h pylori, tums prn    History of bronchitis     History of ear infections     Primary hypertension 2/21/2023     Social History     Socioeconomic History    Marital status: Single   Tobacco Use    Smoking status: Never    Smokeless tobacco: Never   Vaping Use    Vaping status: Former    Quit date: 12/12/2021    Substances: Nicotine, Flavoring    Devices: Disposable, Pre-filled or refillable cartridge   Substance and Sexual Activity    Alcohol use: Yes     Comment: social-  3 drinks twice monthly    Drug use: No    Sexual  "activity: Defer     Birth control/protection: I.U.D.       Allergies  Patient has no known allergies.    Current Medication List    Current Outpatient Medications:     famotidine (PEPCID) 20 MG tablet, Take 1 tablet by mouth Daily., Disp: 30 tablet, Rfl: 2    lisinopril (PRINIVIL,ZESTRIL) 5 MG tablet, Take 2 tablets by mouth Daily. PRN, Disp: , Rfl:     omeprazole (priLOSEC) 40 MG capsule, Take 1 capsule by mouth Daily., Disp: 30 capsule, Rfl: 2    ondansetron (Zofran) 8 MG tablet, Take 1 tablet by mouth Every 12 (Twelve) Hours As Needed., Disp: 30 tablet, Rfl: 2    Semaglutide-Weight Management (Wegovy) 2.4 MG/0.75ML solution auto-injector, Inject 2.4 mg under the skin into the appropriate area as directed 1 (One) Time Per Week., Disp: 3 mL, Rfl: 0    vitamin D (ERGOCALCIFEROL) 1.25 MG (55157 UT) capsule capsule, Take 1 capsule by mouth 1 (One) Time Per Week. (Patient not taking: Reported on 1/10/2024), Disp: 4 capsule, Rfl: 2  Medications that contribute to weight gain: None    Drug Interactions  Wegovy+ Lisinopril: Lisinopril may enhance hypoglcyemic events.     Relevant Laboratory Values  Lab Results   Component Value Date    CHOL 120 01/05/2024    CHLPL 146 02/21/2023    TRIG 117 01/05/2024    HDL 31 (L) 01/05/2024    LDL 68 01/05/2024     There is no height or weight on file to calculate BMI.    Vaccinations:   Patient recommended to keep up with routine vaccinations.     Goals of Therapy  Clinical Goals or Therapeutic Targets: Prevent weight associated co-morbidities and complications      Date   10/24/23   11/21/23   12/15/23   1/15/23   2/14/24   3/13/24   Weight (lb) 250.2 lbs 248.8 lbs 242.4 lb 230.2 lb 224 lb 218.8 lb   BMI kg/ 39.19 38.97 37.97 36.05\" 35.08 34.27   Waist Circumference (in)  48.5\" 48.75\" 47.5\" 48\" 45\" 45\"     Date   4/12/24   Weight (lb) 209.2 lb   BMI kg/ 32.77   Waist Circumference (in)  44\"       Medication Assessment & Plan    Patient has current BMI of 32.77, which is considered " Class II Obesity. Patient has lost a total of 41 lbs since starting therapy. Patient congratulated on success thus far!    Will re-order Wegovy 2.4 mg SC weekly.     Liliam Crooks is a female of childbearing age.  She denies pregnancy, planning to become pregnant or breastfeeding. If patient were to become pregnant while on medication, instructed her to stop drug immediately and inform her provider.     The following medications will need dose adjustments/closer monitoring once the GLP1 is started: none     Patient is unsure if SBP is usually elevated. She denies any symptoms (i.e. headache, chest pain, light headedness). Patient encouraged to check at home and if SBP staying around 140's to reach out to referring provider to get further recommendations. Patient voiced understanding.    Discussed lifestyle modifications, including diet and exercise.  Patient education provided.     Worked with patient to create SMART Goal(s):   Increase water intake to 64 oz per day  To start decreasing pop intake from 1 can per day to 1 per day with goal of eliminating as she continues in the process.     Will follow-up with patient in clinic in 4 weeks.       Thank you,    Ramona Noel RPH  04/12/24  14:42 EDT

## 2024-05-13 ENCOUNTER — DISEASE STATE MANAGEMENT VISIT (OUTPATIENT)
Dept: PHARMACY | Facility: HOSPITAL | Age: 24
End: 2024-05-13
Payer: COMMERCIAL

## 2024-05-13 VITALS
SYSTOLIC BLOOD PRESSURE: 132 MMHG | BODY MASS INDEX: 32.11 KG/M2 | HEART RATE: 79 BPM | DIASTOLIC BLOOD PRESSURE: 86 MMHG | HEIGHT: 67 IN | WEIGHT: 204.6 LBS

## 2024-05-13 RX ORDER — SEMAGLUTIDE 2.4 MG/.75ML
2.4 INJECTION, SOLUTION SUBCUTANEOUS WEEKLY
Qty: 3 ML | Refills: 0 | Status: SHIPPED | OUTPATIENT
Start: 2024-05-13

## 2024-05-13 NOTE — PROGRESS NOTES
[facility]  Weight Management Program      Liliam Crooks is a 23 y.o. female referred to the [facility] by Adore Jones for clinical pharmacy and specialty pharmacy management of GLP1 for weight management.  The patient denies a personal history or family history of thyroid cancer and reports/denies a personal history of pancreatitis.     Liliam Crooks has previously tried Adipex, weight watchers, keto, and lifestyle changes for weight loss. Current weight loss efforts include monitoring intake.     Patient is currently on Wegovy 2.4 mg weekly. Patient denies any lumps/swelling/hoarseness in neck/throat or abdominal pain. Patient reports tolerating medication well without any side effects. Patient denies any trouble giving injection or any missed doses.  Patient reports that she is trying to target with her water intake goal and her soda intake has decreased. Patient reports she has really cut down on her soda intake and doesn't have one even every day at this time. Patient prefers to continue targeting these goals.      Patient wants to continue with Wegovy therapy.         Relevant Past Medical History and Co-morbidities  Past Medical History:   Diagnosis Date    Gallstones     s/p gui    Heartburn     no prior EGD, no prior h pylori, tums prn    History of bronchitis     History of ear infections     Primary hypertension 2/21/2023     Social History     Socioeconomic History    Marital status: Single   Tobacco Use    Smoking status: Never    Smokeless tobacco: Never   Vaping Use    Vaping status: Former    Quit date: 12/12/2021    Substances: Nicotine, Flavoring    Devices: Disposable, Pre-filled or refillable cartridge   Substance and Sexual Activity    Alcohol use: Yes     Comment: social-  3 drinks twice monthly    Drug use: No    Sexual activity: Defer     Birth control/protection: I.U.D.       Allergies  Patient has no known allergies.    Current Medication  "List    Current Outpatient Medications:     famotidine (PEPCID) 20 MG tablet, Take 1 tablet by mouth Daily., Disp: 30 tablet, Rfl: 2    lisinopril (PRINIVIL,ZESTRIL) 5 MG tablet, Take 2 tablets by mouth Daily. PRN (Patient not taking: Reported on 4/12/2024), Disp: , Rfl:     omeprazole (priLOSEC) 40 MG capsule, Take 1 capsule by mouth Daily., Disp: 30 capsule, Rfl: 2    ondansetron (Zofran) 8 MG tablet, Take 1 tablet by mouth Every 12 (Twelve) Hours As Needed., Disp: 30 tablet, Rfl: 2    Semaglutide-Weight Management (Wegovy) 2.4 MG/0.75ML solution auto-injector, Inject 2.4 mg under the skin into the appropriate area as directed 1 (One) Time Per Week., Disp: 3 mL, Rfl: 0    vitamin D (ERGOCALCIFEROL) 1.25 MG (09665 UT) capsule capsule, Take 1 capsule by mouth 1 (One) Time Per Week. (Patient not taking: Reported on 1/10/2024), Disp: 4 capsule, Rfl: 2  Medications that contribute to weight gain: None    Drug Interactions  Wegovy+ Lisinopril: Lisinopril may enhance hypoglcyemic events.     Relevant Laboratory Values  Lab Results   Component Value Date    CHOL 120 01/05/2024    CHLPL 146 02/21/2023    TRIG 117 01/05/2024    HDL 31 (L) 01/05/2024    LDL 68 01/05/2024     There is no height or weight on file to calculate BMI.    Vaccinations:   Patient recommended to keep up with routine vaccinations.     Goals of Therapy  Clinical Goals or Therapeutic Targets: Prevent weight associated co-morbidities and complications      Date   10/24/23   11/21/23   12/15/23   1/15/23   2/14/24   3/13/24   Weight (lb) 250.2 lbs 248.8 lbs 242.4 lb 230.2 lb 224 lb 218.8 lb   BMI kg/ 39.19 38.97 37.97 36.05\" 35.08 34.27   Waist Circumference (in)  48.5\" 48.75\" 47.5\" 48\" 45\" 45\"     Date   4/12/24 5/13/24   Weight (lb) 209.2 lb 204.6 lb   BMI kg/ 32.77 32.04   Waist Circumference (in)  44\" 43.25\"       Medication Assessment & Plan    Patient has current BMI of 32.04, which is considered Class II Obesity. Patient has lost a total of 45.6 " lbs since starting therapy. Patient congratulated on success thus far!    Will re-order Wegovy 2.4 mg SC weekly.     Liliam Crooks is a female of childbearing age.  She denies pregnancy, planning to become pregnant or breastfeeding. If patient were to become pregnant while on medication, instructed her to stop drug immediately and inform her provider.     The following medications will need dose adjustments/closer monitoring once the GLP1 is started: none     Patient is unsure if SBP is usually elevated. She denies any symptoms (i.e. headache, chest pain, light headedness). Patient encouraged to check at home and if SBP staying around 140's to reach out to referring provider to get further recommendations. Patient voiced understanding.    Discussed lifestyle modifications, including diet and exercise.  Patient education provided.     Worked with patient to create SMART Goal(s):   Increase water intake to 64 oz per day  To start decreasing pop intake from 1 can per day to 2-3 per day with goal of eliminating as she continues in the process.     Will follow-up with patient in clinic in 4 weeks.       Thank you,    Leandra Chong. Roslyn, PharmD  05/13/24  13:33 EDT

## 2024-06-10 ENCOUNTER — DISEASE STATE MANAGEMENT VISIT (OUTPATIENT)
Dept: PHARMACY | Facility: HOSPITAL | Age: 24
End: 2024-06-10
Payer: COMMERCIAL

## 2024-06-10 VITALS
HEART RATE: 73 BPM | BODY MASS INDEX: 31.92 KG/M2 | HEIGHT: 67 IN | DIASTOLIC BLOOD PRESSURE: 81 MMHG | WEIGHT: 203.4 LBS | SYSTOLIC BLOOD PRESSURE: 131 MMHG

## 2024-06-10 RX ORDER — SEMAGLUTIDE 2.4 MG/.75ML
2.4 INJECTION, SOLUTION SUBCUTANEOUS WEEKLY
Qty: 3 ML | Refills: 0 | Status: SHIPPED | OUTPATIENT
Start: 2024-06-10

## 2024-06-10 NOTE — PROGRESS NOTES
[facility]  Weight Management Program      Liliam Crooks is a 24 y.o. female referred to the [facility] by Adore Jones for clinical pharmacy and specialty pharmacy management of GLP1 for weight management.  The patient denies a personal history or family history of thyroid cancer and reports/denies a personal history of pancreatitis.     Liliam Crooks has previously tried Adipex, weight watchers, keto, and lifestyle changes for weight loss. Current weight loss efforts include monitoring intake.     Patient is currently on Wegovy 2.4 mg weekly. Patient denies any lumps/swelling/hoarseness in neck/throat or abdominal pain. Patient reports tolerating medication well without any side effects. Patient denies any trouble giving injection or any missed doses.  Patient reports that she is trying to target with her water intake goal and her soda intake has decreased. Patient reports she has really cut down on her soda intake and doesn't have one even every day at this time. Patient prefers to continue targeting these goals.  Patient reports drinking about 24 ounces of water daily. She states she has done well on decreasing soda intake. She may drink 1-2 per week now.    Patient wants to continue with Wegovy therapy.     Patient reports her goal weight is 170 lbs    Relevant Past Medical History and Co-morbidities  Past Medical History:   Diagnosis Date    Gallstones     s/p gui    Heartburn     no prior EGD, no prior h pylori, tums prn    History of bronchitis     History of ear infections     Primary hypertension 2/21/2023     Social History     Socioeconomic History    Marital status: Single   Tobacco Use    Smoking status: Never    Smokeless tobacco: Never   Vaping Use    Vaping status: Former    Quit date: 12/12/2021    Substances: Nicotine, Flavoring    Devices: Disposable, Pre-filled or refillable cartridge   Substance and Sexual Activity    Alcohol use: Yes      "Comment: social-  3 drinks twice monthly    Drug use: No    Sexual activity: Defer     Birth control/protection: I.U.D.       Allergies  Patient has no known allergies.    Current Medication List    Current Outpatient Medications:     famotidine (PEPCID) 20 MG tablet, Take 1 tablet by mouth Daily., Disp: 30 tablet, Rfl: 2    lisinopril (PRINIVIL,ZESTRIL) 5 MG tablet, Take 2 tablets by mouth Daily. PRN (Patient not taking: Reported on 4/12/2024), Disp: , Rfl:     omeprazole (priLOSEC) 40 MG capsule, Take 1 capsule by mouth Daily., Disp: 30 capsule, Rfl: 2    ondansetron (Zofran) 8 MG tablet, Take 1 tablet by mouth Every 12 (Twelve) Hours As Needed., Disp: 30 tablet, Rfl: 2    Semaglutide-Weight Management (Wegovy) 2.4 MG/0.75ML solution auto-injector, Inject 2.4 mg under the skin into the appropriate area as directed 1 (One) Time Per Week., Disp: 3 mL, Rfl: 0    vitamin D (ERGOCALCIFEROL) 1.25 MG (55797 UT) capsule capsule, Take 1 capsule by mouth 1 (One) Time Per Week. (Patient not taking: Reported on 1/10/2024), Disp: 4 capsule, Rfl: 2  Medications that contribute to weight gain: None    Drug Interactions  Wegovy+ Lisinopril: Lisinopril may enhance hypoglcyemic events.     Relevant Laboratory Values  Lab Results   Component Value Date    CHOL 120 01/05/2024    CHLPL 146 02/21/2023    TRIG 117 01/05/2024    HDL 31 (L) 01/05/2024    LDL 68 01/05/2024     There is no height or weight on file to calculate BMI.    Vaccinations:   Patient recommended to keep up with routine vaccinations.     Goals of Therapy  Clinical Goals or Therapeutic Targets: Prevent weight associated co-morbidities and complications      Date   10/24/23   11/21/23   12/15/23   1/15/23   2/14/24   3/13/24   Weight (lb) 250.2 lbs 248.8 lbs 242.4 lb 230.2 lb 224 lb 218.8 lb   BMI kg/ 39.19 38.97 37.97 36.05\" 35.08 34.27   Waist Circumference (in)  48.5\" 48.75\" 47.5\" 48\" 45\" 45\"     Date   4/12/24   5/13/24 6/10/24   Weight (lb) 209.2 lb 204.6 lb 203.4 " "  BMI kg/ 32.77 32.04 31.86   Waist Circumference (in)  44\" 43.25\" 42.5\"       Medication Assessment & Plan    Patient has current BMI of 31.86, which is considered Class II Obesity. Patient has lost a total of 45.6 lbs since starting therapy. Patient congratulated on success thus far!    Will re-order Wegovy 2.4 mg SC weekly.     Liliam Crooks is a female of childbearing age.  She denies pregnancy, planning to become pregnant or breastfeeding. If patient were to become pregnant while on medication, instructed her to stop drug immediately and inform her provider.     The following medications will need dose adjustments/closer monitoring once the GLP1 is started: none     Patient is unsure if SBP is usually elevated. She denies any symptoms (i.e. headache, chest pain, light headedness). Patient encouraged to check at home and if SBP staying around 140's to reach out to referring provider to get further recommendations. Patient voiced understanding.    Discussed lifestyle modifications, including diet and exercise.  Patient education provided.     Worked with patient to create SMART Goal(s):   Increase water intake to 64 oz per day  To start decreasing pop intake from 1 can per day to 2-3 per day with goal of eliminating as she continues in the process.     Will follow-up with patient in clinic in 4 weeks.     Thank you,    Ruby Lynch, PharmD  06/10/24  15:45 EDT    "

## 2024-06-26 ENCOUNTER — OFFICE VISIT (OUTPATIENT)
Dept: FAMILY MEDICINE CLINIC | Age: 24
End: 2024-06-26
Payer: COMMERCIAL

## 2024-06-26 VITALS
DIASTOLIC BLOOD PRESSURE: 82 MMHG | HEIGHT: 67 IN | OXYGEN SATURATION: 100 % | BODY MASS INDEX: 32.21 KG/M2 | SYSTOLIC BLOOD PRESSURE: 131 MMHG | WEIGHT: 205.2 LBS | HEART RATE: 74 BPM

## 2024-06-26 DIAGNOSIS — E66.9 OBESITY (BMI 30.0-34.9): Primary | ICD-10-CM

## 2024-06-26 DIAGNOSIS — K21.00 GASTROESOPHAGEAL REFLUX DISEASE WITH ESOPHAGITIS WITHOUT HEMORRHAGE: ICD-10-CM

## 2024-06-26 DIAGNOSIS — E55.9 VITAMIN D DEFICIENCY DISEASE: ICD-10-CM

## 2024-06-26 PROCEDURE — 99214 OFFICE O/P EST MOD 30 MIN: CPT | Performed by: NURSE PRACTITIONER

## 2024-06-26 RX ORDER — FAMOTIDINE 20 MG/1
20 TABLET, FILM COATED ORAL DAILY
Qty: 30 TABLET | Refills: 2 | Status: SHIPPED | OUTPATIENT
Start: 2024-06-26

## 2024-06-26 RX ORDER — ONDANSETRON HYDROCHLORIDE 8 MG/1
8 TABLET, FILM COATED ORAL EVERY 12 HOURS PRN
Qty: 30 TABLET | Refills: 2 | Status: SHIPPED | OUTPATIENT
Start: 2024-06-26

## 2024-06-26 RX ORDER — OMEPRAZOLE 40 MG/1
40 CAPSULE, DELAYED RELEASE ORAL DAILY
Qty: 30 CAPSULE | Refills: 2 | Status: SHIPPED | OUTPATIENT
Start: 2024-06-26

## 2024-06-26 NOTE — PROGRESS NOTES
Chief Complaint  Follow-up    Subjective      History of Present Illness        Liliam Crooks is a 24 y.o. female who presents to [facility] for follow up of weight loss management.     BMI Readings from Last 1 Encounters:   24 32.14 kg/m²     Documented weights    24 1234   Weight: 93.1 kg (205 lb 3.2 oz)         Patient is currently taking medication Wegovy 2.4mg weekly.  She does experience side effects such as mild nausea.   Her current weight is 205lbs for a total weight loss of 40lbs. Pt denies any dysphagia, hoarseness, neck swelling/ lumps or abdominal pain.     Review of Systems   Constitutional:  Negative for appetite change.        Decreased appetite while taking Wegovy   HENT: Negative.     Eyes: Negative.    Respiratory: Negative.     Cardiovascular: Negative.    Gastrointestinal: Negative.    Endocrine: Negative.    Genitourinary: Negative.    Musculoskeletal: Negative.    Skin: Negative.    Allergic/Immunologic: Negative.    Neurological: Negative.    Hematological: Negative.    Psychiatric/Behavioral: Negative.            Past Medical History:   Diagnosis Date    Gallstones     s/p gui    Heartburn     no prior EGD, no prior h pylori, tums prn    History of bronchitis     History of ear infections     Primary hypertension 2023     Past Surgical History:   Procedure Laterality Date    ADENOIDECTOMY  2009     SECTION Bilateral 2022    Procedure:  SECTION PRIMARY;  Surgeon: Nicki Mathews DO;  Location: [facility] LABOR DELIVERY;  Service: Obstetrics/Gynecology;  Laterality: Bilateral;    CHOLECYSTECTOMY N/A 2022    Procedure: CHOLECYSTECTOMY LAPAROSCOPIC WITH DAVINCI ROBOT;  Surgeon: Cristino Welch MD;  Location: [facility] OR;  Service: Robotics - DaVinci;  Laterality: N/A;    EAR TUBES        Family History   Problem Relation Age of Onset    No Known Problems Mother     Hypertension Father     Obesity Father      Diabetes Maternal Aunt     Diabetes Paternal Aunt     Hypertension Maternal Grandmother     Heart attack Maternal Grandmother 68    Cancer Paternal Grandmother     Hypertension Paternal Grandmother     Hypertension Paternal Grandfather       Social History     Socioeconomic History    Marital status: Single   Tobacco Use    Smoking status: Never    Smokeless tobacco: Never   Vaping Use    Vaping status: Former    Quit date: 12/12/2021    Substances: Nicotine, Flavoring    Devices: Disposable, Pre-filled or refillable cartridge   Substance and Sexual Activity    Alcohol use: Yes     Comment: social-  3 drinks twice monthly    Drug use: No    Sexual activity: Defer     Birth control/protection: I.U.D.      No Known Allergies   Current Outpatient Medications on File Prior to Visit   Medication Sig Dispense Refill    famotidine (PEPCID) 20 MG tablet Take 1 tablet by mouth Daily. 30 tablet 2    lisinopril (PRINIVIL,ZESTRIL) 5 MG tablet Take 2 tablets by mouth Daily. PRN (Patient not taking: Reported on 4/12/2024)      omeprazole (priLOSEC) 40 MG capsule Take 1 capsule by mouth Daily. 30 capsule 2    ondansetron (Zofran) 8 MG tablet Take 1 tablet by mouth Every 12 (Twelve) Hours As Needed. 30 tablet 2    Semaglutide-Weight Management (Wegovy) 2.4 MG/0.75ML solution auto-injector Inject 2.4 mg under the skin into the appropriate area as directed 1 (One) Time Per Week. 3 mL 0    vitamin D (ERGOCALCIFEROL) 1.25 MG (95959 UT) capsule capsule Take 1 capsule by mouth 1 (One) Time Per Week. (Patient not taking: Reported on 1/10/2024) 4 capsule 2     No current facility-administered medications on file prior to visit.        The following portions of the patient's history were reviewed and updated as appropriate: allergies, current medications, past family history, past social history, past medical history, past surgical history and problem list.     Objective   Vital Signs:  /82 (BP Location: Right arm, Patient Position:  "Sitting, Cuff Size: Adult)   Pulse 74   Ht 170.2 cm (67\")   Wt 93.1 kg (205 lb 3.2 oz)   SpO2 100%   BMI 32.14 kg/m²   Estimated body mass index is 32.14 kg/m² as calculated from the following:    Height as of this encounter: 170.2 cm (67\").    Weight as of this encounter: 93.1 kg (205 lb 3.2 oz).                   Physical Exam  Constitutional:       Appearance: She is obese.   HENT:      Head: Normocephalic.   Eyes:      Extraocular Movements: Extraocular movements intact.      Pupils: Pupils are equal, round, and reactive to light.   Neck:      Comments: No thyroidmegaly  Cardiovascular:      Rate and Rhythm: Normal rate and regular rhythm.   Pulmonary:      Effort: Pulmonary effort is normal.      Breath sounds: Normal breath sounds.   Abdominal:      General: Bowel sounds are normal.      Palpations: Abdomen is soft.      Tenderness: There is no abdominal tenderness.   Musculoskeletal:      Cervical back: Normal range of motion.   Skin:     General: Skin is warm and dry.   Neurological:      General: No focal deficit present.      Mental Status: She is alert and oriented to person, place, and time.   Psychiatric:         Mood and Affect: Mood normal.         Thought Content: Thought content normal.          No visits with results within 3 Month(s) from this visit.   Latest known visit with results is:   Lab on 01/12/2024   Component Date Value Ref Range Status    RPR 01/12/2024 Non-Reactive  Non-Reactive Final    Amylase 01/12/2024 42  28 - 100 U/L Final    Lipase 01/12/2024 68 (H)  13 - 60 U/L Final    BRITTANEY Direct 01/12/2024 Positive (A)  Negative Final    AAT, DNA Analysis 01/12/2024 Comment   Final    Result:  c.1096 G>A (p.[id number]), Z allele - Not detected  c.863 A>T (p.[id number]), S allele - Not detected  Not associated with increased risk of developing clinically  relevant symptoms of alpha-1 antitrypsin deficiency. See  Additional Clinical Information and Comments.    Additional Information: " 2024 Comment   Final    Comment: Additional Clinical Information:  Alpha-1 antitrypsin deficiency is an autosomal recessive metabolic  disorder with variable severity and age at onset. Signs and  symptoms may include increased risk for chronic obstructive lung  disease that typically manifests after age 30, liver disease, and  liver cancer. Liver disease can be present in infancy as   cholestasis (jaundice) or in adulthood as cirrhosis and fibrosis.  Lung and liver disease may be accelerated by environmental exposures  such as smoking and excessive alcohol use. Established treatments  for COPD and emphysema are used to treat lung disease; lung and/or  liver transplantation may be an option for those with with severe  disease. Intravenous augmentation therapy may be available for  patients who meet criteria.  Comments:  The ZZ and SZ genotypes account for more than 95% of individuals  with severe alpha-1 antitrypsin deficiency. To rule out other  variants, further testing of symptomatic individuals heterozygous                            for  one variant (S or Z) or with negative results may include phenotyping  (PI typing), AAT level testing, and/or expanded genotyping.  Genetic counseling is recommended to discuss the potential clinical  implications of positive results, as well as recommendations for  testing family members.  Genetic Coordinators are available for health care providers to  discuss results at [phone]).  Test Details:  Two variants analyzed:  c.1096 G>A (p.[id number]), commonly referred to as the Z allele or PI*Z  c.863 A>T (p.[id number]), commonly referred to as the S allele or PI*S  Methods/Limitations:  DNA analysis of the S and Z alleles in the SERPINA1 gene (NM_[id number])  was performed by multiplex allele-specific PCR amplification followed  by gel electrophoresis. Results must be combined with clinical  information for the most accurate interpretation.  Molecular-based  testing is highly accurate, but as in any laboratory test, rare  diagnostic errors may occur. False positive or                            false negative results  may occur for reasons that include genetic variants, blood  transfusions, bone marrow transplantation, somatic or tissue-specific  mosaicism, mislabeled samples, or erroneous representation of family  relationships.  This test was developed and its performance characteristics determined  by "[employer]. It has not been cleared or approved by the Food and  Drug Administration.  References:  Janet RA, Domonique G, Loree ML, Piyush M, Connor CE,  K,  Rehan DK, Jose F SL, Shasha JM, Vasquez SADLER, Gi C, Jj J.  The Diagnosis and Management of Alpha-1 Antitrypsin Deficiency in  the Adult. Chronic Obstr Pulm Dis. 2016 Jun 6;3(3):668-682. doi:  10.01281/jcopdf.3.3.2015.0182. PMID: 03960938; PMCID: RLJ3880100.  Vasquez SADLER, Winnie V, Gloria SAGE. Alpha-1 Antitrypsin Deficiency.  2006 Oct 27 [Updated 2020 May 21]. In: Balaji MP, Shelton HH, Anni RA,  et al., editors. Marj(R) [Internet]. [place] ([place]): [facility]; 3658-3543.                            Available from:  https://www.ncbi.nlm.nih.gov/books/VVG5395/    Electronically signed by: 01/12/2024 Kaylynn Flaherty, PhD   Final    Mitochondrial Ab 01/12/2024 <20.0  0.0 - 20.0 Units Final                                    Negative    0.0 - 20.0                                  Equivocal  20.1 - 24.9                                  Positive         >24.9  Mitochondrial (M2) Antibodies are found in 90-96% of  patients with primary biliary cirrhosis.    ALPHA-FETOPROTEIN 01/12/2024 <2  0 - 8.3 ng/mL Final    Endomysial IgA 01/12/2024 Negative  Negative Final    Tissue Transglutaminase IgA 01/12/2024 <2  0 - 3 U/mL Final                                  Negative        0 -  3                                Weak Positive   4 - 10                                 Positive           >10   Tissue Transglutaminase (tTG) has been identified   as the endomysial antigen.  Studies have demonstr-   ated that endomysial IgA antibodies have over 99%   specificity for gluten sensitive enteropathy.    IgA 01/12/2024 357 (H)  87 - 352 mg/dL Final    Glucose 01/12/2024 72  65 - 99 mg/dL Final    BUN 01/12/2024 9  6 - 20 mg/dL Final    Creatinine 01/12/2024 0.91  0.57 - 1.00 mg/dL Final    Sodium 01/12/2024 138  136 - 145 mmol/L Final    Potassium 01/12/2024 3.8  3.5 - 5.2 mmol/L Final    Chloride 01/12/2024 105  98 - 107 mmol/L Final    CO2 01/12/2024 21.4 (L)  22.0 - 29.0 mmol/L Final    Calcium 01/12/2024 9.2  8.6 - 10.5 mg/dL Final    Total Protein 01/12/2024 6.9  6.0 - 8.5 g/dL Final    Albumin 01/12/2024 4.0  3.5 - 5.2 g/dL Final    ALT (SGPT) 01/12/2024 69 (H)  1 - 33 U/L Final    AST (SGOT) 01/12/2024 37 (H)  1 - 32 U/L Final    Alkaline Phosphatase 01/12/2024 68  39 - 117 U/L Final    Total Bilirubin 01/12/2024 0.3  0.0 - 1.2 mg/dL Final    Globulin 01/12/2024 2.9  gm/dL Final    A/G Ratio 01/12/2024 1.4  g/dL Final    BUN/Creatinine Ratio 01/12/2024 9.9  7.0 - 25.0 Final    Anion Gap 01/12/2024 11.6  5.0 - 15.0 mmol/L Final    eGFR 01/12/2024 91.1  >60.0 mL/min/1.73 Final    Bilirubin, Direct 01/12/2024 <0.2  0.0 - 0.3 mg/dL Final    GGT 01/12/2024 48 (H)  5 - 36 U/L Final    Ferritin 01/12/2024 138.00  13.00 - 150.00 ng/mL Final    Iron 01/12/2024 90  37 - 145 mcg/dL Final    Iron Saturation (TSAT) 01/12/2024 26  20 - 50 % Final    Transferrin 01/12/2024 233  200 - 360 mg/dL Final    TIBC 01/12/2024 347  298 - 536 mcg/dL Final    HIV DUO 01/12/2024 Non-Reactive  Non-Reactive Final    HSV 1 IgG, Type Specific 01/12/2024 30.60 (H)  0.00 - 0.90 index Final                                     Negative        <0.91                                   Equivocal 0.91 - 1.09                                   Positive        >1.09   Note: Negative indicates no antibodies detected to    HSV-1. Equivocal may suggest early infection.  If   clinically appropriate, retest at later date. Positive   indicates antibodies detected to HSV-1.    HSV 2 IgG, Type Spec 01/12/2024 <0.91  0.00 - 0.90 index Final                                     Negative        <0.91                                   Equivocal 0.91 - 1.09                                   Positive        >1.09   HSV-2 Antibody Interpretation: Current guidelines and   recommendations do not recommend routine screening   for HSV-2 in asymptomatic individuals, including   those that are pregnant. A negative antibody result   indicates no detectable antibodies to HSV-2 were   found. If recent exposure is suspected, retest in 4   to 6 weeks. Equivocal samples should be retested in   4 to 6 weeks. A positive result indicates the   presence of detectable IgG antibody to HSV-2.   FALSE POSITIVE RESULTS MAY OCCUR. Repeat testing, or   testing by a different method, may be indicated in   some settings (e.g. patients with low likelihood of   HSV infection). If clinically appropriate, retest 4   to 6 weeks later. HSV-2 IgG antibody testing results   should be clinically correlated.    Anti-DNA (DS) Ab Qn 01/12/2024 1  0 - 9 IU/mL Final                                       Negative      <5                                     Equivocal  5 - 9                                     Positive      >9    See below: 01/12/2024 Comment   Final    Comment: Autoantibody                       Disease Association  ------------------------------------------------------------                          Condition                  Frequency  ---------------------   ------------------------   ---------  Antinuclear Antibody,    SLE, mixed connective  Direct (BRITTANEY-D)           tissue diseases  ---------------------   ------------------------   ---------  dsDNA                    SLE                        40 - 60%  ---------------------   ------------------------    ---------  Chromatin                Drug induced SLE                90%                           SLE                        48 - 97%  ---------------------   ------------------------   ---------  SSA (Ro)                 SLE                        25 - 35%                           Sjogren's Syndrome         40 - 70%                            Lupus                 100%  ---------------------   ------------------------   ---------  SSB (La)                 SLE                                                        10%                           Sjogren's Syndrome              30%  ---------------------   -----------------------    ---------  Sm (anti-Smith)          SLE                        15 - 30%  ---------------------   -----------------------    ---------  RNP                      Mixed Connective Tissue                           Disease                         95%  (U1 nRNP,                SLE                        30 - 50%  anti-ribonucleoprotein)  Polymyositis and/or                           Dermatomyositis                 20%  ---------------------   ------------------------   ---------  Scl-70 (antiDNA          Scleroderma (diffuse)      20 - 35%  topoisomerase)           Crest                           13%  ---------------------   ------------------------   ---------  Yanna-1                     Polymyositis and/or                           Dermatomyositis            20 - 40%  ---------------------   ------------------------   ---------  Centromere B             Scleroderma -                            Crest                           variant                         80%       Result Review :         Assessment and Plan     Diagnoses and all orders for this visit:    1. Obesity (BMI 30.0-34.9) (Primary)    2. Gastroesophageal reflux disease with esophagitis without hemorrhage    3. Vitamin D deficiency disease              Pt is tolerating the weight loss medication and understands risks and  benefits as discussed.  Pt is to continue current weight loss medication.       Follow Up   Return in about 3 months (around 9/26/2024).  Patient was given instructions and counseling regarding her condition or for health maintenance advice. Please see specific information pulled into the AVS if appropriate.   Patient is to continue routine follow up with the [facility] and follow up with any labs ordered.         This document has been electronically signed by MONA Alan  June 26, 2024 12:49 EDT

## 2024-06-27 ENCOUNTER — LAB (OUTPATIENT)
Dept: LAB | Facility: HOSPITAL | Age: 24
End: 2024-06-27
Payer: COMMERCIAL

## 2024-06-27 DIAGNOSIS — K21.00 GASTROESOPHAGEAL REFLUX DISEASE WITH ESOPHAGITIS WITHOUT HEMORRHAGE: ICD-10-CM

## 2024-06-27 DIAGNOSIS — E66.9 OBESITY (BMI 30.0-34.9): ICD-10-CM

## 2024-06-27 DIAGNOSIS — E55.9 VITAMIN D DEFICIENCY DISEASE: ICD-10-CM

## 2024-06-27 LAB
25(OH)D3 SERPL-MCNC: 38.4 NG/ML (ref 30–100)
ALBUMIN SERPL-MCNC: 3.9 G/DL (ref 3.5–5.2)
ALBUMIN/GLOB SERPL: 1.4 G/DL
ALP SERPL-CCNC: 68 U/L (ref 39–117)
ALT SERPL W P-5'-P-CCNC: 37 U/L (ref 1–33)
ANION GAP SERPL CALCULATED.3IONS-SCNC: 9.8 MMOL/L (ref 5–15)
AST SERPL-CCNC: 19 U/L (ref 1–32)
BILIRUB SERPL-MCNC: 0.3 MG/DL (ref 0–1.2)
BUN SERPL-MCNC: 9 MG/DL (ref 6–20)
BUN/CREAT SERPL: 9 (ref 7–25)
CALCIUM SPEC-SCNC: 9.1 MG/DL (ref 8.6–10.5)
CHLORIDE SERPL-SCNC: 105 MMOL/L (ref 98–107)
CHOLEST SERPL-MCNC: 116 MG/DL (ref 0–200)
CO2 SERPL-SCNC: 23.2 MMOL/L (ref 22–29)
CREAT SERPL-MCNC: 1 MG/DL (ref 0.57–1)
CRP SERPL-MCNC: <0.3 MG/DL (ref 0–0.5)
EGFRCR SERPLBLD CKD-EPI 2021: 80.8 ML/MIN/1.73
GLOBULIN UR ELPH-MCNC: 2.7 GM/DL
GLUCOSE SERPL-MCNC: 63 MG/DL (ref 65–99)
HBA1C MFR BLD: 4.8 % (ref 4.8–5.6)
HDLC SERPL QL: 2.97
HDLC SERPL-MCNC: 39 MG/DL (ref 40–60)
LDLC SERPL CALC-MCNC: 61 MG/DL (ref 0–100)
POTASSIUM SERPL-SCNC: 3.9 MMOL/L (ref 3.5–5.2)
PROT SERPL-MCNC: 6.6 G/DL (ref 6–8.5)
SODIUM SERPL-SCNC: 138 MMOL/L (ref 136–145)
T3 SERPL-MCNC: 95.9 NG/DL (ref 80–200)
T4 FREE SERPL-MCNC: 1.3 NG/DL (ref 0.92–1.68)
TRIGL SERPL-MCNC: 78 MG/DL (ref 0–150)
TSH SERPL DL<=0.05 MIU/L-ACNC: 0.93 UIU/ML (ref 0.27–4.2)
VIT B12 BLD-MCNC: 221 PG/ML (ref 211–946)
VLDLC SERPL-MCNC: 16 MG/DL (ref 5–40)

## 2024-06-27 PROCEDURE — 82607 VITAMIN B-12: CPT

## 2024-06-27 PROCEDURE — 86140 C-REACTIVE PROTEIN: CPT | Performed by: NURSE PRACTITIONER

## 2024-06-27 PROCEDURE — 82306 VITAMIN D 25 HYDROXY: CPT

## 2024-06-27 PROCEDURE — 80053 COMPREHEN METABOLIC PANEL: CPT

## 2024-06-27 PROCEDURE — 84480 ASSAY TRIIODOTHYRONINE (T3): CPT | Performed by: NURSE PRACTITIONER

## 2024-06-27 PROCEDURE — 83036 HEMOGLOBIN GLYCOSYLATED A1C: CPT | Performed by: NURSE PRACTITIONER

## 2024-06-27 PROCEDURE — 80061 LIPID PANEL: CPT | Performed by: NURSE PRACTITIONER

## 2024-06-27 PROCEDURE — 84443 ASSAY THYROID STIM HORMONE: CPT | Performed by: NURSE PRACTITIONER

## 2024-06-27 PROCEDURE — 84439 ASSAY OF FREE THYROXINE: CPT | Performed by: NURSE PRACTITIONER

## 2024-07-12 ENCOUNTER — DISEASE STATE MANAGEMENT VISIT (OUTPATIENT)
Dept: PHARMACY | Facility: HOSPITAL | Age: 24
End: 2024-07-12
Payer: COMMERCIAL

## 2024-07-12 VITALS
BODY MASS INDEX: 31.99 KG/M2 | WEIGHT: 203.8 LBS | HEART RATE: 95 BPM | DIASTOLIC BLOOD PRESSURE: 79 MMHG | SYSTOLIC BLOOD PRESSURE: 142 MMHG | HEIGHT: 67 IN

## 2024-07-12 RX ORDER — SEMAGLUTIDE 2.4 MG/.75ML
2.4 INJECTION, SOLUTION SUBCUTANEOUS WEEKLY
Qty: 9 ML | Refills: 0 | Status: SHIPPED | OUTPATIENT
Start: 2024-07-12

## 2024-07-12 NOTE — PROGRESS NOTES
[facility]  Weight Management Program      Liliam Crooks is a 24 y.o. female referred to the [facility] by Adore Jones for clinical pharmacy and specialty pharmacy management of GLP1 for weight management.  The patient denies a personal history or family history of thyroid cancer and reports/denies a personal history of pancreatitis.     Liliam Crooks has previously tried Adipex, weight watchers, keto, and lifestyle changes for weight loss. Current weight loss efforts include monitoring intake.     Patient is currently on Wegovy 2.4 mg weekly. Patient denies any lumps/swelling/hoarseness in neck/throat or abdominal pain. Patient reports tolerating medication well without any side effects. Patient denies any trouble giving injection or any missed doses. Patient does report that she doesn't feel quite as much appetite suppression on the dose as she once was.   Patient reports that she is trying to target with her water intake goal and her soda intake has decreased. Patient reports she has really cut down on her soda intake and doesn't have one even every day at this time. Patient prefers to continue targeting these goals.  Patient reports drinking about 24 ounces of water daily. She states she has done well on decreasing soda intake. She may drink 1-2 per week now.    Patient wants to continue with Wegovy therapy.     Patient reports her goal weight is 170 lbs    Relevant Past Medical History and Co-morbidities  Past Medical History:   Diagnosis Date    Gallstones     s/p gui    Heartburn     no prior EGD, no prior h pylori, tums prn    History of bronchitis     History of ear infections     Primary hypertension 2/21/2023     Social History     Socioeconomic History    Marital status: Single   Tobacco Use    Smoking status: Never    Smokeless tobacco: Never   Vaping Use    Vaping status: Former    Quit date: 12/12/2021    Substances: Nicotine, Flavoring    Devices:  "Disposable, Pre-filled or refillable cartridge   Substance and Sexual Activity    Alcohol use: Yes     Comment: social-  3 drinks twice monthly    Drug use: No    Sexual activity: Defer     Birth control/protection: I.U.D.       Allergies  Patient has no known allergies.    Current Medication List    Current Outpatient Medications:     famotidine (PEPCID) 20 MG tablet, Take 1 tablet by mouth Daily., Disp: 30 tablet, Rfl: 2    lisinopril (PRINIVIL,ZESTRIL) 5 MG tablet, Take 2 tablets by mouth Daily. PRN (Patient not taking: Reported on 4/12/2024), Disp: , Rfl:     omeprazole (priLOSEC) 40 MG capsule, Take 1 capsule by mouth Daily., Disp: 30 capsule, Rfl: 2    ondansetron (Zofran) 8 MG tablet, Take 1 tablet by mouth Every 12 (Twelve) Hours As Needed for Nausea., Disp: 30 tablet, Rfl: 2    Semaglutide-Weight Management (Wegovy) 2.4 MG/0.75ML solution auto-injector, Inject 2.4 mg under the skin into the appropriate area as directed 1 (One) Time Per Week., Disp: 3 mL, Rfl: 0    vitamin D (ERGOCALCIFEROL) 1.25 MG (57786 UT) capsule capsule, Take 1 capsule by mouth 1 (One) Time Per Week. (Patient not taking: Reported on 1/10/2024), Disp: 4 capsule, Rfl: 2  Medications that contribute to weight gain: None    Drug Interactions  Wegovy+ Lisinopril: Lisinopril may enhance hypoglcyemic events.     Relevant Laboratory Values  Lab Results   Component Value Date    CHOL 116 06/27/2024    CHLPL 146 02/21/2023    TRIG 78 06/27/2024    HDL 39 (L) 06/27/2024    LDL 61 06/27/2024     There is no height or weight on file to calculate BMI.    Vaccinations:   Patient recommended to keep up with routine vaccinations.     Goals of Therapy  Clinical Goals or Therapeutic Targets: Prevent weight associated co-morbidities and complications      Date   10/24/23   11/21/23   12/15/23   1/15/23   2/14/24   3/13/24   Weight (lb) 250.2 lbs 248.8 lbs 242.4 lb 230.2 lb 224 lb 218.8 lb   BMI kg/ 39.19 38.97 37.97 36.05\" 35.08 34.27   Waist " "Circumference (in)  48.5\" 48.75\" 47.5\" 48\" 45\" 45\"     Date   4/12/24   5/13/24 6/10/24 7/12/24   Weight (lb) 209.2 lb 204.6 lb 203.4 203.8 lb   BMI kg/ 32.77 32.04 31.86 31.92   Waist Circumference (in)  44\" 43.25\" 42.5\" 42.5\"       Medication Assessment & Plan    Patient has current BMI of 31.86, which is considered Class II Obesity. Patient has lost a total of 45.6 lbs since starting therapy. Patient congratulated on success thus far!    Will re-order Wegovy 2.4 mg SC weekly.     Liliam Crooks is a female of childbearing age.  She denies pregnancy, planning to become pregnant or breastfeeding. If patient were to become pregnant while on medication, instructed her to stop drug immediately and inform her provider.     The following medications will need dose adjustments/closer monitoring once the GLP1 is started: none     Patient is unsure if SBP is usually elevated. She denies any symptoms (i.e. headache, chest pain, light headedness). Patient encouraged to check at home and if SBP staying around 140's to reach out to referring provider to get further recommendations. Patient voiced understanding. Patient reports BP was running better than it had been here    Pt had follow up with referring provider in June with note to continue weight management medication. Pt also completed lab work at this time and reports it was reviewed.     Discussed lifestyle modifications, including diet and exercise.  Patient education provided.     Worked with patient to create SMART Goal(s):   Increase water intake to 64 oz per day  To start decreasing pop intake from 1 can per day to 2-3 per day with goal of eliminating as she continues in the process.     Will follow-up with patient in clinic in 4 weeks.     Thank you,    Leandra Chong. Roslyn, PharmD  07/12/24  14:45 EDT    "

## 2024-10-30 ENCOUNTER — APPOINTMENT (OUTPATIENT)
Dept: CT IMAGING | Facility: HOSPITAL | Age: 24
End: 2024-10-30
Payer: COMMERCIAL

## 2024-10-30 ENCOUNTER — HOSPITAL ENCOUNTER (EMERGENCY)
Facility: HOSPITAL | Age: 24
Discharge: HOME | End: 2024-10-30
Attending: EMERGENCY MEDICINE | Admitting: EMERGENCY MEDICINE
Payer: COMMERCIAL

## 2024-10-30 VITALS
SYSTOLIC BLOOD PRESSURE: 141 MMHG | TEMPERATURE: 97.5 F | HEART RATE: 79 BPM | OXYGEN SATURATION: 97 % | DIASTOLIC BLOOD PRESSURE: 90 MMHG | HEIGHT: 67 IN | BODY MASS INDEX: 31.71 KG/M2 | RESPIRATION RATE: 18 BRPM | WEIGHT: 202 LBS

## 2024-10-30 DIAGNOSIS — R11.2 NAUSEA AND VOMITING, UNSPECIFIED VOMITING TYPE: Primary | ICD-10-CM

## 2024-10-30 LAB
ALBUMIN SERPL-MCNC: 4.7 G/DL (ref 3.5–5.2)
ALBUMIN/GLOB SERPL: 1.1 G/DL
ALP SERPL-CCNC: 114 U/L (ref 39–117)
ALT SERPL W P-5'-P-CCNC: 51 U/L (ref 1–33)
ANION GAP SERPL CALCULATED.3IONS-SCNC: 12.7 MMOL/L (ref 5–15)
AST SERPL-CCNC: 30 U/L (ref 1–32)
B-HCG UR QL: NEGATIVE
BACTERIA UR QL AUTO: ABNORMAL /HPF
BASOPHILS # BLD AUTO: 0.02 10*3/MM3 (ref 0–0.2)
BASOPHILS NFR BLD AUTO: 0.2 % (ref 0–1.5)
BILIRUB SERPL-MCNC: 0.7 MG/DL (ref 0–1.2)
BILIRUB UR QL STRIP: NEGATIVE
BUN SERPL-MCNC: 19 MG/DL (ref 6–20)
BUN/CREAT SERPL: 18.4 (ref 7–25)
CALCIUM SPEC-SCNC: 9.8 MG/DL (ref 8.6–10.5)
CHLORIDE SERPL-SCNC: 95 MMOL/L (ref 98–107)
CLARITY UR: ABNORMAL
CO2 SERPL-SCNC: 26.3 MMOL/L (ref 22–29)
COLOR UR: ABNORMAL
CREAT SERPL-MCNC: 1.03 MG/DL (ref 0.57–1)
CRP SERPL-MCNC: <0.3 MG/DL (ref 0–0.5)
DEPRECATED RDW RBC AUTO: 40 FL (ref 37–54)
EGFRCR SERPLBLD CKD-EPI 2021: 78 ML/MIN/1.73
EOSINOPHIL # BLD AUTO: 0.03 10*3/MM3 (ref 0–0.4)
EOSINOPHIL NFR BLD AUTO: 0.3 % (ref 0.3–6.2)
ERYTHROCYTE [DISTWIDTH] IN BLOOD BY AUTOMATED COUNT: 12.3 % (ref 12.3–15.4)
GLOBULIN UR ELPH-MCNC: 4.1 GM/DL
GLUCOSE SERPL-MCNC: 98 MG/DL (ref 65–99)
GLUCOSE UR STRIP-MCNC: NEGATIVE MG/DL
HCT VFR BLD AUTO: 48.8 % (ref 34–46.6)
HGB BLD-MCNC: 16.6 G/DL (ref 12–15.9)
HGB UR QL STRIP.AUTO: NEGATIVE
HOLD SPECIMEN: NORMAL
HYALINE CASTS UR QL AUTO: ABNORMAL /LPF
IMM GRANULOCYTES # BLD AUTO: 0.02 10*3/MM3 (ref 0–0.05)
IMM GRANULOCYTES NFR BLD AUTO: 0.2 % (ref 0–0.5)
KETONES UR QL STRIP: ABNORMAL
LEUKOCYTE ESTERASE UR QL STRIP.AUTO: ABNORMAL
LIPASE SERPL-CCNC: 106 U/L (ref 13–60)
LYMPHOCYTES # BLD AUTO: 1.66 10*3/MM3 (ref 0.7–3.1)
LYMPHOCYTES NFR BLD AUTO: 16.4 % (ref 19.6–45.3)
MAGNESIUM SERPL-MCNC: 2.3 MG/DL (ref 1.6–2.6)
MCH RBC QN AUTO: 30.5 PG (ref 26.6–33)
MCHC RBC AUTO-ENTMCNC: 34 G/DL (ref 31.5–35.7)
MCV RBC AUTO: 89.5 FL (ref 79–97)
MONOCYTES # BLD AUTO: 0.69 10*3/MM3 (ref 0.1–0.9)
MONOCYTES NFR BLD AUTO: 6.8 % (ref 5–12)
NEUTROPHILS NFR BLD AUTO: 7.72 10*3/MM3 (ref 1.7–7)
NEUTROPHILS NFR BLD AUTO: 76.1 % (ref 42.7–76)
NITRITE UR QL STRIP: NEGATIVE
NRBC BLD AUTO-RTO: 0 /100 WBC (ref 0–0.2)
PH UR STRIP.AUTO: 5.5 [PH] (ref 5–8)
PLATELET # BLD AUTO: 356 10*3/MM3 (ref 140–450)
PMV BLD AUTO: 8.3 FL (ref 6–12)
POTASSIUM SERPL-SCNC: 3.9 MMOL/L (ref 3.5–5.2)
PROT SERPL-MCNC: 8.8 G/DL (ref 6–8.5)
PROT UR QL STRIP: ABNORMAL
RBC # BLD AUTO: 5.45 10*6/MM3 (ref 3.77–5.28)
RBC # UR STRIP: ABNORMAL /HPF
REF LAB TEST METHOD: ABNORMAL
SODIUM SERPL-SCNC: 134 MMOL/L (ref 136–145)
SP GR UR STRIP: >1.03 (ref 1–1.03)
SQUAMOUS #/AREA URNS HPF: ABNORMAL /HPF
UROBILINOGEN UR QL STRIP: ABNORMAL
WBC # UR STRIP: ABNORMAL /HPF
WBC NRBC COR # BLD AUTO: 10.14 10*3/MM3 (ref 3.4–10.8)
WHOLE BLOOD HOLD COAG: NORMAL
WHOLE BLOOD HOLD SPECIMEN: NORMAL

## 2024-10-30 PROCEDURE — 96374 THER/PROPH/DIAG INJ IV PUSH: CPT

## 2024-10-30 PROCEDURE — 25510000001 IOPAMIDOL 61 % SOLUTION: Performed by: EMERGENCY MEDICINE

## 2024-10-30 PROCEDURE — 96375 TX/PRO/DX INJ NEW DRUG ADDON: CPT

## 2024-10-30 PROCEDURE — 83735 ASSAY OF MAGNESIUM: CPT

## 2024-10-30 PROCEDURE — 85025 COMPLETE CBC W/AUTO DIFF WBC: CPT

## 2024-10-30 PROCEDURE — 25010000002 KETOROLAC TROMETHAMINE PER 15 MG: Performed by: EMERGENCY MEDICINE

## 2024-10-30 PROCEDURE — 25010000002 ONDANSETRON PER 1 MG

## 2024-10-30 PROCEDURE — 74177 CT ABD & PELVIS W/CONTRAST: CPT | Performed by: RADIOLOGY

## 2024-10-30 PROCEDURE — 86140 C-REACTIVE PROTEIN: CPT

## 2024-10-30 PROCEDURE — 99285 EMERGENCY DEPT VISIT HI MDM: CPT

## 2024-10-30 PROCEDURE — 81001 URINALYSIS AUTO W/SCOPE: CPT

## 2024-10-30 PROCEDURE — 83690 ASSAY OF LIPASE: CPT

## 2024-10-30 PROCEDURE — 74177 CT ABD & PELVIS W/CONTRAST: CPT

## 2024-10-30 PROCEDURE — 80053 COMPREHEN METABOLIC PANEL: CPT

## 2024-10-30 PROCEDURE — 25810000003 SODIUM CHLORIDE 0.9 % SOLUTION

## 2024-10-30 PROCEDURE — 81025 URINE PREGNANCY TEST: CPT

## 2024-10-30 RX ORDER — PROCHLORPERAZINE MALEATE 10 MG
10 TABLET ORAL EVERY 6 HOURS PRN
Qty: 20 TABLET | Refills: 0 | Status: SHIPPED | OUTPATIENT
Start: 2024-10-30

## 2024-10-30 RX ORDER — SODIUM CHLORIDE 0.9 % (FLUSH) 0.9 %
10 SYRINGE (ML) INJECTION AS NEEDED
Status: DISCONTINUED | OUTPATIENT
Start: 2024-10-30 | End: 2024-10-30 | Stop reason: HOSPADM

## 2024-10-30 RX ORDER — KETOROLAC TROMETHAMINE 30 MG/ML
30 INJECTION, SOLUTION INTRAMUSCULAR; INTRAVENOUS EVERY 6 HOURS PRN
Status: DISCONTINUED | OUTPATIENT
Start: 2024-10-30 | End: 2024-10-30 | Stop reason: HOSPADM

## 2024-10-30 RX ORDER — ONDANSETRON 2 MG/ML
4 INJECTION INTRAMUSCULAR; INTRAVENOUS ONCE
Status: COMPLETED | OUTPATIENT
Start: 2024-10-30 | End: 2024-10-30

## 2024-10-30 RX ORDER — IOPAMIDOL 612 MG/ML
100 INJECTION, SOLUTION INTRAVASCULAR
Status: COMPLETED | OUTPATIENT
Start: 2024-10-30 | End: 2024-10-30

## 2024-10-30 RX ORDER — ONDANSETRON 4 MG/1
4 TABLET, ORALLY DISINTEGRATING ORAL EVERY 6 HOURS PRN
Qty: 30 TABLET | Refills: 0 | Status: SHIPPED | OUTPATIENT
Start: 2024-10-30

## 2024-10-30 RX ADMIN — IOPAMIDOL 100 ML: 612 INJECTION, SOLUTION INTRAVENOUS at 16:40

## 2024-10-30 RX ADMIN — ONDANSETRON 4 MG: 2 INJECTION INTRAMUSCULAR; INTRAVENOUS at 15:35

## 2024-10-30 RX ADMIN — KETOROLAC TROMETHAMINE 30 MG: 30 INJECTION, SOLUTION INTRAMUSCULAR; INTRAVENOUS at 17:25

## 2024-10-30 RX ADMIN — SODIUM CHLORIDE 500 ML: 9 INJECTION, SOLUTION INTRAVENOUS at 15:35

## 2024-10-30 NOTE — ED PROVIDER NOTES
Subjective   History of Present Illness  Patient is a 24-year-old female who presents today with complaints of vomiting.  Patient reports that she did have a double shot on 9 AM this week and began vomiting afterwards.  Patient reports that she has been taking Wegovy for approximately 1 year and this is not a new dose.  Patient denies any abdominal pain, diarrhea, or constipation.  Patient denies any known fever and is afebrile upon her arrival today.  Patient denies any other complaints.  Patient is alert and oriented able to answer questions appropriately.  Patient presents private vehicle.        Review of Systems   Constitutional: Negative.  Negative for fever.   HENT: Negative.     Respiratory: Negative.     Cardiovascular: Negative.  Negative for chest pain.   Gastrointestinal:  Positive for nausea and vomiting. Negative for abdominal pain.   Endocrine: Negative.    Genitourinary: Negative.  Negative for dysuria.   Skin: Negative.    Neurological: Negative.    Psychiatric/Behavioral: Negative.     All other systems reviewed and are negative.      Past Medical History:   Diagnosis Date    Gallstones     s/p gui    Heartburn     no prior EGD, no prior h pylori, tums prn    History of bronchitis     History of ear infections     Primary hypertension 2023       No Known Allergies    Past Surgical History:   Procedure Laterality Date    ADENOIDECTOMY  2009     SECTION Bilateral 2022    Procedure:  SECTION PRIMARY;  Surgeon: Nicki Mathews DO;  Location: [facility] LABOR DELIVERY;  Service: Obstetrics/Gynecology;  Laterality: Bilateral;    CHOLECYSTECTOMY N/A 2022    Procedure: CHOLECYSTECTOMY LAPAROSCOPIC WITH DAVINCI ROBOT;  Surgeon: Cristino Welch MD;  Location: [facility] OR;  Service: Robotics - DaVinci;  Laterality: N/A;    EAR TUBES         Family History   Problem Relation Age of Onset    No Known Problems Mother     Hypertension Father     Obesity Father      Diabetes Maternal Aunt     Diabetes Paternal Aunt     Hypertension Maternal Grandmother     Heart attack Maternal Grandmother 68    Cancer Paternal Grandmother     Hypertension Paternal Grandmother     Hypertension Paternal Grandfather        Social History     Socioeconomic History    Marital status: Single   Tobacco Use    Smoking status: Never    Smokeless tobacco: Never   Vaping Use    Vaping status: Former    Quit date: 12/12/2021    Substances: Nicotine, Flavoring    Devices: Disposable, Pre-filled or refillable cartridge   Substance and Sexual Activity    Alcohol use: Yes     Comment: social-  3 drinks twice monthly    Drug use: No    Sexual activity: Defer     Birth control/protection: I.U.D.           Objective   Physical Exam  Vitals and nursing note reviewed.   Constitutional:       General: She is not in acute distress.     Appearance: She is well-developed. She is not diaphoretic.   HENT:      Head: Normocephalic and atraumatic.      Right Ear: External ear normal.      Left Ear: External ear normal.      Nose: Nose normal.   Eyes:      Conjunctiva/sclera: Conjunctivae normal.      Pupils: Pupils are equal, round, and reactive to light.   Neck:      Vascular: No JVD.      Trachea: No tracheal deviation.   Cardiovascular:      Rate and Rhythm: Normal rate and regular rhythm.      Heart sounds: Normal heart sounds. No murmur heard.  Pulmonary:      Effort: Pulmonary effort is normal. No respiratory distress.      Breath sounds: Normal breath sounds. No wheezing.   Abdominal:      General: Bowel sounds are normal.      Palpations: Abdomen is soft.      Tenderness: There is no abdominal tenderness.   Musculoskeletal:         General: No deformity. Normal range of motion.      Cervical back: Normal range of motion and neck supple.   Skin:     General: Skin is warm and dry.      Coloration: Skin is not pale.      Findings: No erythema or rash.   Neurological:      Mental Status: She is alert and oriented to  person, place, and time.      Cranial Nerves: No cranial nerve deficit.   Psychiatric:         Behavior: Behavior normal.         Thought Content: Thought content normal.       Results for orders placed or performed during the hospital encounter of 10/30/24   Comprehensive Metabolic Panel    Collection Time: 10/30/24  3:29 PM    Specimen: Arm, Left; Blood   Result Value Ref Range    Glucose 98 65 - 99 mg/dL    BUN 19 6 - 20 mg/dL    Creatinine 1.03 (H) 0.57 - 1.00 mg/dL    Sodium 134 (L) 136 - 145 mmol/L    Potassium 3.9 3.5 - 5.2 mmol/L    Chloride 95 (L) 98 - 107 mmol/L    CO2 26.3 22.0 - 29.0 mmol/L    Calcium 9.8 8.6 - 10.5 mg/dL    Total Protein 8.8 (H) 6.0 - 8.5 g/dL    Albumin 4.7 3.5 - 5.2 g/dL    ALT (SGPT) 51 (H) 1 - 33 U/L    AST (SGOT) 30 1 - 32 U/L    Alkaline Phosphatase 114 39 - 117 U/L    Total Bilirubin 0.7 0.0 - 1.2 mg/dL    Globulin 4.1 gm/dL    A/G Ratio 1.1 g/dL    BUN/Creatinine Ratio 18.4 7.0 - 25.0    Anion Gap 12.7 5.0 - 15.0 mmol/L    eGFR 78.0 >60.0 mL/min/1.73   Lipase    Collection Time: 10/30/24  3:29 PM    Specimen: Arm, Left; Blood   Result Value Ref Range    Lipase 106 (H) 13 - 60 U/L   Pregnancy, Urine - Urine, Clean Catch    Collection Time: 10/30/24  3:29 PM    Specimen: Urine, Clean Catch   Result Value Ref Range    HCG, Urine QL Negative Negative   Urinalysis With Microscopic If Indicated (No Culture) - Urine, Clean Catch    Collection Time: 10/30/24  3:29 PM    Specimen: Urine, Clean Catch   Result Value Ref Range    Color, UA Dark Yellow (A) Yellow, Straw    Appearance, UA Turbid (A) Clear    pH, UA 5.5 5.0 - 8.0    Specific Gravity, UA >1.030 (H) 1.005 - 1.030    Glucose, UA Negative Negative    Ketones, UA Trace (A) Negative    Bilirubin, UA Negative Negative    Blood, UA Negative Negative    Protein, UA Trace (A) Negative    Leuk Esterase, UA Small (1+) (A) Negative    Nitrite, UA Negative Negative    Urobilinogen, UA 0.2 E.U./dL 0.2 - 1.0 E.U./dL   C-reactive Protein     Collection Time: 10/30/24  3:29 PM    Specimen: Arm, Left; Blood   Result Value Ref Range    C-Reactive Protein <0.30 0.00 - 0.50 mg/dL   Magnesium    Collection Time: 10/30/24  3:29 PM    Specimen: Arm, Left; Blood   Result Value Ref Range    Magnesium 2.3 1.6 - 2.6 mg/dL   CBC Auto Differential    Collection Time: 10/30/24  3:29 PM    Specimen: Arm, Left; Blood   Result Value Ref Range    WBC 10.14 3.40 - 10.80 10*3/mm3    RBC 5.45 (H) 3.77 - 5.28 10*6/mm3    Hemoglobin 16.6 (H) 12.0 - 15.9 g/dL    Hematocrit 48.8 (H) 34.0 - 46.6 %    MCV 89.5 79.0 - 97.0 fL    MCH 30.5 26.6 - 33.0 pg    MCHC 34.0 31.5 - 35.7 g/dL    RDW 12.3 12.3 - 15.4 %    RDW-SD 40.0 37.0 - 54.0 fl    MPV 8.3 6.0 - 12.0 fL    Platelets 356 140 - 450 10*3/mm3    Neutrophil % 76.1 (H) 42.7 - 76.0 %    Lymphocyte % 16.4 (L) 19.6 - 45.3 %    Monocyte % 6.8 5.0 - 12.0 %    Eosinophil % 0.3 0.3 - 6.2 %    Basophil % 0.2 0.0 - 1.5 %    Immature Grans % 0.2 0.0 - 0.5 %    Neutrophils, Absolute 7.72 (H) 1.70 - 7.00 10*3/mm3    Lymphocytes, Absolute 1.66 0.70 - 3.10 10*3/mm3    Monocytes, Absolute 0.69 0.10 - 0.90 10*3/mm3    Eosinophils, Absolute 0.03 0.00 - 0.40 10*3/mm3    Basophils, Absolute 0.02 0.00 - 0.20 10*3/mm3    Immature Grans, Absolute 0.02 0.00 - 0.05 10*3/mm3    nRBC 0.0 0.0 - 0.2 /100 WBC   Urinalysis, Microscopic Only - Urine, Clean Catch    Collection Time: 10/30/24  3:29 PM    Specimen: Urine, Clean Catch   Result Value Ref Range    RBC, UA 3-5 (A) None Seen, 0-2 /HPF    WBC, UA 6-10 (A) None Seen, 0-2 /HPF    Bacteria, UA 1+ (A) None Seen /HPF    Squamous Epithelial Cells, UA 7-12 (A) None Seen, 0-2 /HPF    Hyaline Casts, UA None Seen None Seen /LPF    Methodology Automated Microscopy    [place] Urine Culture Tube - Urine, Clean Catch    Collection Time: 10/30/24  3:29 PM    Specimen: Urine, Clean Catch   Result Value Ref Range    Extra Tube Hold for add-ons.    Green Top (Gel)    Collection Time: 10/30/24  3:29 PM   Result Value Ref  Range    Extra Tube Hold for add-ons.    Lavender Top    Collection Time: 10/30/24  3:29 PM   Result Value Ref Range    Extra Tube hold for add-on    Gold Top - SST    Collection Time: 10/30/24  3:29 PM   Result Value Ref Range    Extra Tube Hold for add-ons.    Light Blue Top    Collection Time: 10/30/24  3:29 PM   Result Value Ref Range    Extra Tube Hold for add-ons.      CT Abdomen Pelvis With Contrast    Result Date: 10/30/2024  1.  No CT evidence of inflammation involving the pancreas. No pseudocyst identified. No evidence of pancreatic necrosis. 2.  Scattered air-fluid levels small bowel which are mildly prominent but no wall thickening identified and no findings to suggest small bowel obstruction. Favor mild ileus and may be in the setting of enteritis. 3.  Cholecystectomy. 4. Other incidental/nonacute findings above.   This report was finalized on 10/30/2024 4:46 PM by Dr. Santy Kenyon MD.       Procedures           ED Course                                               Medical Decision Making  Patient is a 24-year-old female who presents today with complaints of vomiting.  Patient reports that she did have a double shot on 9 AM this week and began vomiting afterwards.  Patient reports that she has been taking Wegovy for approximately 1 year and this is not a new dose.  Patient denies any abdominal pain, diarrhea, or constipation.  Patient denies any known fever and is afebrile upon her arrival today.  Patient denies any other complaints.  Patient is alert and oriented able to answer questions appropriately.  Patient presents private vehicle.    Amount and/or Complexity of Data Reviewed  Labs: ordered.  Radiology: ordered.    Risk  Prescription drug management.        Final diagnoses:   Nausea and vomiting, unspecified vomiting type       ED Disposition  ED Disposition       ED Disposition   Discharge    Condition   Stable    Comment   --               Neva, April, APRN  [address] [place] [address]  [phone]    Schedule an appointment as soon as possible for a visit   As needed    [facility] EMERGENCY DEPARTMENT  [address] [id number] [phone]  Go to   If symptoms worsen         Medication List        New Prescriptions      ondansetron ODT 4 MG disintegrating tablet  Commonly known as: ZOFRAN-ODT  Place 1 tablet on the tongue Every 6 (Six) Hours As Needed for Nausea or Vomiting.     prochlorperazine 10 MG tablet  Commonly known as: COMPAZINE  Take 1 tablet by mouth Every 6 (Six) Hours As Needed for Nausea or Vomiting.               Where to Get Your Medications        These medications were sent to [address] [place] [address]      Hours: Monday to Friday 7 AM to 6 PM Phone: [phone]   ondansetron ODT 4 MG disintegrating tablet  prochlorperazine 10 MG tablet            Madhavi Painting, APRN  10/30/24 9210

## (undated) DEVICE — GLV SURG PREMIERPRO MIC LTX PF SZ7.5 BRN

## (undated) DEVICE — DRP UTIL 2/LAYR W/TP 15X26IN STRL PK/4

## (undated) DEVICE — SLV SCD CALF HEMOFORCE DVT THERP REPR LG

## (undated) DEVICE — TISSUE RETRIEVAL SYSTEM: Brand: INZII RETRIEVAL SYSTEM

## (undated) DEVICE — BG RESUSCITATOR INFANT BABYBLUE2

## (undated) DEVICE — SKIN AFFIX SURG ADHESIVE 72/CS 0.55ML: Brand: MEDLINE

## (undated) DEVICE — SUT VIC 0/0 UR6 27IN DYED J603H

## (undated) DEVICE — ST TBG PNEUMOCLEAR EVAC SMOKE HIFLO

## (undated) DEVICE — COVER,MAYO STAND,STERILE: Brand: MEDLINE

## (undated) DEVICE — ADHS SKIN PREMIERPRO EXOFIN TOPICAL HI/VISC .5ML

## (undated) DEVICE — ARM DRAPE

## (undated) DEVICE — CVR DISP HUG U VAC STEEP TREND

## (undated) DEVICE — CANNULA SEAL

## (undated) DEVICE — LARGE HEM-O-LOK CLIP APPLIER: Brand: ENDOWRIST

## (undated) DEVICE — CUFF SCD HEMOFORCE SEQ CALF STD MD

## (undated) DEVICE — APPL CHLORAPREP W/TINT 26ML ORNG

## (undated) DEVICE — SUT MNCRYL 4/0 PS2 18 IN

## (undated) DEVICE — APPL CHLORAPREP HI/LITE 26ML ORNG

## (undated) DEVICE — BLADELESS OBTURATOR: Brand: WECK VISTA

## (undated) DEVICE — CADIERE FORCEPS: Brand: ENDOWRIST

## (undated) DEVICE — PK C/SECT 70

## (undated) DEVICE — PERMANENT CAUTERY HOOK: Brand: ENDOWRIST

## (undated) DEVICE — HYDROGEL COATED LATEX URINE METER FOLEY TRAY,16 FR/CH (5.3 MM), 5 ML CATHETER PRE-CONNECTED TO 2000 ML DRAINAGE BAG WITH NEEDLE SAMPLING: Brand: DOVER

## (undated) DEVICE — INSUFFLATION NEEDLE TO ESTABLISH PNEUMOPERITONEUM.: Brand: INSUFFLATION NEEDLE

## (undated) DEVICE — PK LAP GEN 70

## (undated) DEVICE — PATIENT RETURN ELECTRODE, SINGLE-USE, CONTACT QUALITY MONITORING, ADULT, WITH 9FT CORD, FOR PATIENTS WEIGING OVER 33LBS. (15KG): Brand: MEGADYNE

## (undated) DEVICE — UNDERGLV SURG BIOGEL INDICAT PF 8 GRN

## (undated) DEVICE — TRY SPINE PENCAN 24GA X4IN

## (undated) DEVICE — HOLDER: Brand: DEROYAL

## (undated) DEVICE — PAD POSTN ARMBND 1P/U